# Patient Record
Sex: MALE | Race: WHITE | Employment: OTHER | URBAN - METROPOLITAN AREA
[De-identification: names, ages, dates, MRNs, and addresses within clinical notes are randomized per-mention and may not be internally consistent; named-entity substitution may affect disease eponyms.]

---

## 2017-01-29 PROBLEM — G56.20 ULNAR NERVE COMPRESSION: Status: ACTIVE | Noted: 2017-01-29

## 2017-01-29 PROBLEM — R55 SYNCOPE: Status: ACTIVE | Noted: 2017-01-29

## 2017-01-29 PROBLEM — I10 HYPERTENSION: Chronic | Status: ACTIVE | Noted: 2017-01-29

## 2017-01-29 PROBLEM — E11.9 DIABETES MELLITUS (HCC): Chronic | Status: ACTIVE | Noted: 2017-01-29

## 2017-01-29 PROBLEM — S02.92XA: Status: ACTIVE | Noted: 2017-01-29

## 2017-01-30 PROBLEM — G56.30 RADIAL NERVE COMPRESSION: Status: ACTIVE | Noted: 2017-01-29

## 2017-01-30 PROBLEM — G56.30 RADIAL NERVE PALSY: Status: ACTIVE | Noted: 2017-01-30

## 2017-02-07 ENCOUNTER — HOSPITAL ENCOUNTER (OUTPATIENT)
Dept: GENERAL RADIOLOGY | Age: 62
Discharge: HOME | End: 2017-02-07
Attending: PHYSICIAN ASSISTANT | Admitting: PHYSICIAN ASSISTANT

## 2017-02-07 DIAGNOSIS — M25.512 LEFT SHOULDER PAIN, UNSPECIFIED CHRONICITY: ICD-10-CM

## 2017-02-17 ENCOUNTER — NURSE ONLY (OUTPATIENT)
Dept: CARDIOLOGY CLINIC | Age: 62
End: 2017-02-17

## 2017-02-17 VITALS
SYSTOLIC BLOOD PRESSURE: 130 MMHG | DIASTOLIC BLOOD PRESSURE: 72 MMHG | HEART RATE: 71 BPM | RESPIRATION RATE: 14 BRPM | OXYGEN SATURATION: 96 %

## 2017-02-17 DIAGNOSIS — R55 SYNCOPE AND COLLAPSE: Primary | ICD-10-CM

## 2017-02-17 DIAGNOSIS — Z45.09 ENCOUNTER FOR LOOP RECORDER CHECK: ICD-10-CM

## 2017-02-17 PROCEDURE — 99024 POSTOP FOLLOW-UP VISIT: CPT | Performed by: INTERNAL MEDICINE

## 2017-02-17 ASSESSMENT — ENCOUNTER SYMPTOMS
BACK PAIN: 0
WHEEZING: 0
DIARRHEA: 0
CONSTIPATION: 0
ABDOMINAL PAIN: 0
CHEST TIGHTNESS: 0
VOMITING: 0
COUGH: 0
NAUSEA: 0
EYE PAIN: 0
COLOR CHANGE: 0
PHOTOPHOBIA: 0
BLOOD IN STOOL: 0
SHORTNESS OF BREATH: 0

## 2017-02-22 ENCOUNTER — OFFICE VISIT (OUTPATIENT)
Dept: CARDIOLOGY CLINIC | Age: 62
End: 2017-02-22

## 2017-02-22 VITALS
HEIGHT: 71 IN | WEIGHT: 206.2 LBS | SYSTOLIC BLOOD PRESSURE: 120 MMHG | HEART RATE: 86 BPM | BODY MASS INDEX: 28.87 KG/M2 | DIASTOLIC BLOOD PRESSURE: 60 MMHG

## 2017-02-22 DIAGNOSIS — R55 SYNCOPE, UNSPECIFIED SYNCOPE TYPE: ICD-10-CM

## 2017-02-22 DIAGNOSIS — R55 SYNCOPE AND COLLAPSE: ICD-10-CM

## 2017-02-22 DIAGNOSIS — E11.9 TYPE 2 DIABETES MELLITUS WITHOUT COMPLICATION, WITHOUT LONG-TERM CURRENT USE OF INSULIN (HCC): ICD-10-CM

## 2017-02-22 DIAGNOSIS — I10 ESSENTIAL HYPERTENSION: Primary | ICD-10-CM

## 2017-02-22 DIAGNOSIS — I48.0 PAROXYSMAL ATRIAL FIBRILLATION (HCC): ICD-10-CM

## 2017-02-22 PROCEDURE — 99213 OFFICE O/P EST LOW 20 MIN: CPT | Performed by: INTERNAL MEDICINE

## 2017-02-24 ENCOUNTER — HOSPITAL ENCOUNTER (OUTPATIENT)
Dept: OCCUPATIONAL THERAPY | Age: 62
Discharge: HOME | End: 2017-02-28
Attending: PSYCHIATRY & NEUROLOGY | Admitting: PSYCHIATRY & NEUROLOGY

## 2017-02-24 ENCOUNTER — HOSPITAL ENCOUNTER (OUTPATIENT)
Dept: PHYSICAL THERAPY | Age: 62
Discharge: HOME | End: 2017-02-24
Attending: PSYCHIATRY & NEUROLOGY | Admitting: PSYCHIATRY & NEUROLOGY

## 2017-02-27 ENCOUNTER — HOSPITAL ENCOUNTER (OUTPATIENT)
Dept: OCCUPATIONAL THERAPY | Age: 62
Discharge: HOME | End: 2017-02-27
Admitting: PSYCHIATRY & NEUROLOGY

## 2017-03-01 ENCOUNTER — HOSPITAL ENCOUNTER (OUTPATIENT)
Dept: OTHER | Age: 62
Discharge: HOME | End: 2017-03-31
Attending: PSYCHIATRY & NEUROLOGY | Admitting: PSYCHIATRY & NEUROLOGY

## 2017-03-06 ENCOUNTER — HOSPITAL ENCOUNTER (OUTPATIENT)
Dept: OCCUPATIONAL THERAPY | Age: 62
Discharge: HOME | End: 2017-03-06
Admitting: PSYCHIATRY & NEUROLOGY

## 2017-03-08 ENCOUNTER — HOSPITAL ENCOUNTER (OUTPATIENT)
Dept: OCCUPATIONAL THERAPY | Age: 62
Discharge: HOME | End: 2017-03-08
Admitting: PSYCHIATRY & NEUROLOGY

## 2017-03-13 ENCOUNTER — HOSPITAL ENCOUNTER (OUTPATIENT)
Dept: OCCUPATIONAL THERAPY | Age: 62
Discharge: HOME | End: 2017-03-13
Admitting: PSYCHIATRY & NEUROLOGY

## 2017-03-15 ENCOUNTER — HOSPITAL ENCOUNTER (OUTPATIENT)
Dept: OCCUPATIONAL THERAPY | Age: 62
Discharge: HOME | End: 2017-03-15
Admitting: PSYCHIATRY & NEUROLOGY

## 2017-03-20 ENCOUNTER — PROCEDURE VISIT (OUTPATIENT)
Dept: CARDIOLOGY CLINIC | Age: 62
End: 2017-03-20

## 2017-03-20 DIAGNOSIS — Z45.09 ENCOUNTER FOR ELECTRONIC ANALYSIS OF REVEAL EVENT RECORDER: ICD-10-CM

## 2017-03-20 DIAGNOSIS — R55 SYNCOPE, UNSPECIFIED SYNCOPE TYPE: Primary | ICD-10-CM

## 2017-03-20 PROCEDURE — 93298 REM INTERROG DEV EVAL SCRMS: CPT | Performed by: INTERNAL MEDICINE

## 2017-03-20 PROCEDURE — 93299 PR REM INTERROG ICPMS/SCRMS <30 D TECH REVIEW: CPT | Performed by: INTERNAL MEDICINE

## 2017-03-21 ENCOUNTER — HOSPITAL ENCOUNTER (OUTPATIENT)
Dept: OCCUPATIONAL THERAPY | Age: 62
Discharge: HOME | End: 2017-03-21
Admitting: PSYCHIATRY & NEUROLOGY

## 2017-03-23 ENCOUNTER — HOSPITAL ENCOUNTER (OUTPATIENT)
Dept: OCCUPATIONAL THERAPY | Age: 62
Discharge: HOME | End: 2017-03-23
Admitting: PSYCHIATRY & NEUROLOGY

## 2017-03-27 ENCOUNTER — HOSPITAL ENCOUNTER (OUTPATIENT)
Dept: OCCUPATIONAL THERAPY | Age: 62
Discharge: HOME | End: 2017-03-27
Admitting: PSYCHIATRY & NEUROLOGY

## 2017-03-29 ENCOUNTER — HOSPITAL ENCOUNTER (OUTPATIENT)
Dept: OCCUPATIONAL THERAPY | Age: 62
Discharge: HOME | End: 2017-03-29
Admitting: PSYCHIATRY & NEUROLOGY

## 2017-04-01 ENCOUNTER — HOSPITAL ENCOUNTER (OUTPATIENT)
Dept: OTHER | Age: 62
Discharge: HOME | End: 2017-04-30
Attending: PSYCHIATRY & NEUROLOGY | Admitting: PSYCHIATRY & NEUROLOGY

## 2017-04-03 ENCOUNTER — OFFICE VISIT (OUTPATIENT)
Dept: ORTHOPEDIC SURGERY | Age: 62
End: 2017-04-03

## 2017-04-03 ENCOUNTER — HOSPITAL ENCOUNTER (OUTPATIENT)
Dept: OCCUPATIONAL THERAPY | Age: 62
Discharge: HOME | End: 2017-04-03
Admitting: PSYCHIATRY & NEUROLOGY

## 2017-04-03 VITALS — WEIGHT: 206 LBS | RESPIRATION RATE: 16 BRPM | HEIGHT: 71 IN | BODY MASS INDEX: 28.84 KG/M2

## 2017-04-03 DIAGNOSIS — G56.32 RADIAL NERVE PALSY, LEFT: ICD-10-CM

## 2017-04-03 DIAGNOSIS — R52 PAIN: ICD-10-CM

## 2017-04-03 DIAGNOSIS — G56.92 COMPRESSION NEUROPATHY OF UPPER EXTREMITY, LEFT: Primary | ICD-10-CM

## 2017-04-03 DIAGNOSIS — S46.112A BICEPS TENDON TEAR, LEFT, INITIAL ENCOUNTER: ICD-10-CM

## 2017-04-03 PROCEDURE — 99244 OFF/OP CNSLTJ NEW/EST MOD 40: CPT | Performed by: ORTHOPAEDIC SURGERY

## 2017-04-03 RX ORDER — GABAPENTIN 300 MG/1
CAPSULE ORAL
COMMUNITY
Start: 2017-03-13 | End: 2019-05-20

## 2017-04-03 RX ORDER — PEN NEEDLE, DIABETIC 32GX 5/32"
NEEDLE, DISPOSABLE MISCELLANEOUS
COMMUNITY
Start: 2017-03-13

## 2017-04-03 RX ORDER — GABAPENTIN 300 MG/1
300 CAPSULE ORAL
COMMUNITY
End: 2017-04-03 | Stop reason: SDUPTHER

## 2017-04-03 RX ORDER — SULFAMETHOXAZOLE AND TRIMETHOPRIM 800; 160 MG/1; MG/1
TABLET ORAL
COMMUNITY
Start: 2017-02-03 | End: 2017-09-05

## 2017-04-03 RX ORDER — METFORMIN HYDROCHLORIDE 500 MG/1
TABLET, EXTENDED RELEASE ORAL
COMMUNITY
Start: 2017-03-05 | End: 2017-04-03 | Stop reason: SDUPTHER

## 2017-04-03 RX ORDER — HYDROCODONE BITARTRATE AND ACETAMINOPHEN 5; 325 MG/1; MG/1
TABLET ORAL
COMMUNITY
Start: 2017-02-02 | End: 2018-03-08

## 2017-04-03 RX ORDER — DIGOXIN 250 MCG
TABLET ORAL
COMMUNITY
Start: 2016-12-27 | End: 2018-08-03

## 2017-04-03 RX ORDER — BLOOD-GLUCOSE METER
EACH MISCELLANEOUS
COMMUNITY
Start: 2017-02-14

## 2017-04-03 RX ORDER — AMOXICILLIN 500 MG/1
CAPSULE ORAL
COMMUNITY
Start: 2017-02-01 | End: 2017-09-05 | Stop reason: ALTCHOICE

## 2017-04-03 RX ORDER — BLOOD SUGAR DIAGNOSTIC
STRIP MISCELLANEOUS
COMMUNITY
Start: 2017-02-14

## 2017-04-03 ASSESSMENT — ENCOUNTER SYMPTOMS
RESPIRATORY NEGATIVE: 1
EYES NEGATIVE: 1
GASTROINTESTINAL NEGATIVE: 1

## 2017-04-07 ENCOUNTER — HOSPITAL ENCOUNTER (OUTPATIENT)
Dept: OCCUPATIONAL THERAPY | Age: 62
Discharge: HOME | End: 2017-04-07
Admitting: PSYCHIATRY & NEUROLOGY

## 2017-04-11 ENCOUNTER — HOSPITAL ENCOUNTER (OUTPATIENT)
Dept: OCCUPATIONAL THERAPY | Age: 62
Discharge: HOME | End: 2017-04-11
Admitting: PSYCHIATRY & NEUROLOGY

## 2017-04-13 ENCOUNTER — HOSPITAL ENCOUNTER (OUTPATIENT)
Dept: OCCUPATIONAL THERAPY | Age: 62
Discharge: HOME | End: 2017-04-13
Admitting: PSYCHIATRY & NEUROLOGY

## 2017-04-19 ENCOUNTER — HOSPITAL ENCOUNTER (OUTPATIENT)
Dept: OCCUPATIONAL THERAPY | Age: 62
Discharge: HOME | End: 2017-04-19
Admitting: PSYCHIATRY & NEUROLOGY

## 2017-04-21 ENCOUNTER — HOSPITAL ENCOUNTER (OUTPATIENT)
Dept: OCCUPATIONAL THERAPY | Age: 62
Discharge: HOME | End: 2017-04-21
Admitting: PSYCHIATRY & NEUROLOGY

## 2017-04-25 ENCOUNTER — HOSPITAL ENCOUNTER (OUTPATIENT)
Dept: OCCUPATIONAL THERAPY | Age: 62
Discharge: HOME | End: 2017-04-25
Admitting: PSYCHIATRY & NEUROLOGY

## 2017-04-27 ENCOUNTER — TELEPHONE (OUTPATIENT)
Dept: CARDIOLOGY CLINIC | Age: 62
End: 2017-04-27

## 2017-04-27 ENCOUNTER — HOSPITAL ENCOUNTER (OUTPATIENT)
Dept: OCCUPATIONAL THERAPY | Age: 62
Discharge: HOME | End: 2017-04-27
Admitting: PSYCHIATRY & NEUROLOGY

## 2017-05-01 ENCOUNTER — HOSPITAL ENCOUNTER (OUTPATIENT)
Dept: OTHER | Age: 62
Discharge: HOME | End: 2017-05-31
Attending: PSYCHIATRY & NEUROLOGY | Admitting: PSYCHIATRY & NEUROLOGY

## 2017-05-02 ENCOUNTER — PROCEDURE VISIT (OUTPATIENT)
Dept: CARDIOLOGY CLINIC | Age: 62
End: 2017-05-02

## 2017-05-02 DIAGNOSIS — R55 SYNCOPE, UNSPECIFIED SYNCOPE TYPE: Primary | ICD-10-CM

## 2017-05-02 DIAGNOSIS — Z45.09 ENCOUNTER FOR ELECTRONIC ANALYSIS OF REVEAL EVENT RECORDER: ICD-10-CM

## 2017-05-02 PROCEDURE — 93299 PR REM INTERROG ICPMS/SCRMS <30 D TECH REVIEW: CPT | Performed by: INTERNAL MEDICINE

## 2017-05-02 PROCEDURE — 93298 REM INTERROG DEV EVAL SCRMS: CPT | Performed by: INTERNAL MEDICINE

## 2017-05-03 ENCOUNTER — HOSPITAL ENCOUNTER (OUTPATIENT)
Dept: OCCUPATIONAL THERAPY | Age: 62
Discharge: HOME | End: 2017-05-03
Admitting: PSYCHIATRY & NEUROLOGY

## 2017-05-05 ENCOUNTER — HOSPITAL ENCOUNTER (OUTPATIENT)
Dept: OCCUPATIONAL THERAPY | Age: 62
Discharge: HOME | End: 2017-05-05
Admitting: PSYCHIATRY & NEUROLOGY

## 2017-05-09 ENCOUNTER — HOSPITAL ENCOUNTER (OUTPATIENT)
Dept: OCCUPATIONAL THERAPY | Age: 62
Discharge: HOME | End: 2017-05-09
Admitting: PSYCHIATRY & NEUROLOGY

## 2017-05-09 LAB
AVERAGE GLUCOSE: NORMAL
HBA1C MFR BLD: 7.7 %

## 2017-05-11 ENCOUNTER — HOSPITAL ENCOUNTER (OUTPATIENT)
Dept: OCCUPATIONAL THERAPY | Age: 62
Discharge: HOME | End: 2017-05-11
Admitting: PSYCHIATRY & NEUROLOGY

## 2017-05-22 ENCOUNTER — HOSPITAL ENCOUNTER (OUTPATIENT)
Dept: OCCUPATIONAL THERAPY | Age: 62
Discharge: HOME | End: 2017-05-22
Admitting: PSYCHIATRY & NEUROLOGY

## 2017-05-22 ENCOUNTER — OFFICE VISIT (OUTPATIENT)
Dept: ORTHOPEDIC SURGERY | Age: 62
End: 2017-05-22

## 2017-05-22 VITALS — HEIGHT: 71 IN | RESPIRATION RATE: 16 BRPM | BODY MASS INDEX: 28.84 KG/M2 | WEIGHT: 206 LBS

## 2017-05-22 DIAGNOSIS — G54.0 BRACHIAL PLEXOPATHY: Primary | ICD-10-CM

## 2017-05-22 PROBLEM — S14.2XXA: Status: ACTIVE | Noted: 2017-03-20

## 2017-05-22 PROBLEM — R42 FEELING FAINT: Status: ACTIVE | Noted: 2017-03-20

## 2017-05-22 PROBLEM — R42 DIZZINESS: Status: ACTIVE | Noted: 2017-03-20

## 2017-05-22 PROBLEM — M54.12 CERVICAL NERVE ROOT DISORDER: Status: ACTIVE | Noted: 2017-03-20

## 2017-05-22 PROBLEM — G58.9: Status: ACTIVE | Noted: 2017-03-20

## 2017-05-22 PROBLEM — Z98.890 HISTORY OF BILIARY T-TUBE PLACEMENT: Status: ACTIVE | Noted: 2017-03-20

## 2017-05-22 PROBLEM — F07.81 BRAIN SYNDROME, POSTTRAUMATIC: Status: ACTIVE | Noted: 2017-03-20

## 2017-05-22 PROBLEM — R93.89 ABNORMAL MAGNETIC RESONANCE IMAGING STUDY: Status: ACTIVE | Noted: 2017-03-20

## 2017-05-22 PROBLEM — S06.33AA CEREBRAL CONTUSION: Status: ACTIVE | Noted: 2017-03-20

## 2017-05-22 PROBLEM — D64.9 ANEMIA: Status: ACTIVE | Noted: 2017-03-20

## 2017-05-22 PROBLEM — G90.9 DISORDER OF AUTONOMIC NERVOUS SYSTEM: Status: ACTIVE | Noted: 2017-03-20

## 2017-05-22 PROCEDURE — 99213 OFFICE O/P EST LOW 20 MIN: CPT | Performed by: ORTHOPAEDIC SURGERY

## 2017-05-22 RX ORDER — METFORMIN HYDROCHLORIDE 500 MG/1
TABLET, EXTENDED RELEASE ORAL
COMMUNITY
Start: 2017-05-04 | End: 2018-03-08

## 2017-05-22 RX ORDER — INSULIN GLARGINE 300 U/ML
INJECTION, SOLUTION SUBCUTANEOUS
COMMUNITY
Start: 2017-05-01 | End: 2018-03-08

## 2017-05-26 ENCOUNTER — HOSPITAL ENCOUNTER (OUTPATIENT)
Dept: OCCUPATIONAL THERAPY | Age: 62
Discharge: HOME | End: 2017-05-26
Admitting: PSYCHIATRY & NEUROLOGY

## 2017-05-30 ENCOUNTER — HOSPITAL ENCOUNTER (OUTPATIENT)
Dept: PHYSICAL THERAPY | Age: 62
Discharge: HOME | End: 2017-05-31
Attending: FAMILY MEDICINE | Admitting: FAMILY MEDICINE

## 2017-05-30 ENCOUNTER — HOSPITAL ENCOUNTER (OUTPATIENT)
Dept: OCCUPATIONAL THERAPY | Age: 62
Discharge: HOME | End: 2017-05-30
Admitting: PSYCHIATRY & NEUROLOGY

## 2017-06-01 ENCOUNTER — HOSPITAL ENCOUNTER (OUTPATIENT)
Dept: OTHER | Age: 62
Discharge: HOME | End: 2017-06-16
Attending: PSYCHIATRY & NEUROLOGY | Admitting: PSYCHIATRY & NEUROLOGY

## 2017-06-01 ENCOUNTER — HOSPITAL ENCOUNTER (OUTPATIENT)
Dept: OTHER | Age: 62
Discharge: HOME | End: 2017-06-30
Attending: FAMILY MEDICINE | Admitting: FAMILY MEDICINE

## 2017-06-05 ENCOUNTER — HOSPITAL ENCOUNTER (OUTPATIENT)
Dept: PHYSICAL THERAPY | Age: 62
Discharge: HOME | End: 2017-06-05
Admitting: FAMILY MEDICINE

## 2017-06-05 ENCOUNTER — HOSPITAL ENCOUNTER (OUTPATIENT)
Dept: OCCUPATIONAL THERAPY | Age: 62
Discharge: HOME | End: 2017-06-05
Admitting: PSYCHIATRY & NEUROLOGY

## 2017-06-09 ENCOUNTER — HOSPITAL ENCOUNTER (OUTPATIENT)
Dept: OCCUPATIONAL THERAPY | Age: 62
Discharge: HOME | End: 2017-06-09
Admitting: PSYCHIATRY & NEUROLOGY

## 2017-06-13 ENCOUNTER — HOSPITAL ENCOUNTER (OUTPATIENT)
Dept: OCCUPATIONAL THERAPY | Age: 62
Discharge: HOME | End: 2017-06-13
Admitting: PSYCHIATRY & NEUROLOGY

## 2017-06-16 ENCOUNTER — HOSPITAL ENCOUNTER (OUTPATIENT)
Dept: OCCUPATIONAL THERAPY | Age: 62
Discharge: HOME | End: 2017-06-16
Admitting: PSYCHIATRY & NEUROLOGY

## 2017-06-16 ENCOUNTER — PROCEDURE VISIT (OUTPATIENT)
Dept: CARDIOLOGY CLINIC | Age: 62
End: 2017-06-16

## 2017-06-16 DIAGNOSIS — R55 SYNCOPE, UNSPECIFIED SYNCOPE TYPE: Primary | ICD-10-CM

## 2017-06-16 DIAGNOSIS — Z45.09 ENCOUNTER FOR ELECTRONIC ANALYSIS OF REVEAL EVENT RECORDER: ICD-10-CM

## 2017-06-16 PROCEDURE — 93298 REM INTERROG DEV EVAL SCRMS: CPT | Performed by: INTERNAL MEDICINE

## 2017-06-16 PROCEDURE — 93299 PR REM INTERROG ICPMS/SCRMS <30 D TECH REVIEW: CPT | Performed by: INTERNAL MEDICINE

## 2017-07-01 ENCOUNTER — HOSPITAL ENCOUNTER (OUTPATIENT)
Dept: OTHER | Age: 62
Discharge: HOME | End: 2017-07-31
Attending: FAMILY MEDICINE | Admitting: PSYCHIATRY & NEUROLOGY

## 2017-08-01 ENCOUNTER — HOSPITAL ENCOUNTER (OUTPATIENT)
Dept: OTHER | Age: 62
Discharge: HOME | End: 2017-08-31
Attending: FAMILY MEDICINE | Admitting: PSYCHIATRY & NEUROLOGY

## 2017-08-03 ENCOUNTER — HOSPITAL ENCOUNTER (OUTPATIENT)
Dept: PHYSICAL THERAPY | Age: 62
Discharge: HOME | End: 2017-08-03
Admitting: PSYCHIATRY & NEUROLOGY

## 2017-08-14 ENCOUNTER — PROCEDURE VISIT (OUTPATIENT)
Dept: CARDIOLOGY CLINIC | Age: 62
End: 2017-08-14

## 2017-08-14 DIAGNOSIS — Z45.09 ENCOUNTER FOR ELECTRONIC ANALYSIS OF REVEAL EVENT RECORDER: ICD-10-CM

## 2017-08-14 DIAGNOSIS — R55 SYNCOPE, UNSPECIFIED SYNCOPE TYPE: Primary | ICD-10-CM

## 2017-08-14 PROCEDURE — 93298 REM INTERROG DEV EVAL SCRMS: CPT | Performed by: INTERNAL MEDICINE

## 2017-08-14 PROCEDURE — 93299 PR REM INTERROG ICPMS/SCRMS <30 D TECH REVIEW: CPT | Performed by: INTERNAL MEDICINE

## 2017-09-01 ENCOUNTER — HOSPITAL ENCOUNTER (OUTPATIENT)
Dept: OTHER | Age: 62
Discharge: HOME | End: 2017-09-30
Attending: FAMILY MEDICINE | Admitting: PSYCHIATRY & NEUROLOGY

## 2017-09-05 ENCOUNTER — OFFICE VISIT (OUTPATIENT)
Dept: CARDIOLOGY CLINIC | Age: 62
End: 2017-09-05

## 2017-09-05 VITALS
WEIGHT: 234.8 LBS | SYSTOLIC BLOOD PRESSURE: 128 MMHG | BODY MASS INDEX: 32.87 KG/M2 | DIASTOLIC BLOOD PRESSURE: 60 MMHG | HEART RATE: 80 BPM | HEIGHT: 71 IN

## 2017-09-05 DIAGNOSIS — I10 ESSENTIAL HYPERTENSION: Primary | ICD-10-CM

## 2017-09-05 DIAGNOSIS — R55 SYNCOPE, UNSPECIFIED SYNCOPE TYPE: ICD-10-CM

## 2017-09-05 DIAGNOSIS — I48.0 PAROXYSMAL ATRIAL FIBRILLATION (HCC): ICD-10-CM

## 2017-09-05 DIAGNOSIS — E11.9 TYPE 2 DIABETES MELLITUS WITHOUT COMPLICATION, WITHOUT LONG-TERM CURRENT USE OF INSULIN (HCC): ICD-10-CM

## 2017-09-05 DIAGNOSIS — R55 SYNCOPE AND COLLAPSE: ICD-10-CM

## 2017-09-05 PROCEDURE — 99213 OFFICE O/P EST LOW 20 MIN: CPT | Performed by: INTERNAL MEDICINE

## 2017-09-14 ENCOUNTER — PROCEDURE VISIT (OUTPATIENT)
Dept: CARDIOLOGY CLINIC | Age: 62
End: 2017-09-14

## 2017-09-14 DIAGNOSIS — Z45.09 ENCOUNTER FOR ELECTRONIC ANALYSIS OF REVEAL EVENT RECORDER: ICD-10-CM

## 2017-09-14 DIAGNOSIS — R55 SYNCOPE, UNSPECIFIED SYNCOPE TYPE: Primary | ICD-10-CM

## 2017-09-14 PROCEDURE — 93298 REM INTERROG DEV EVAL SCRMS: CPT | Performed by: INTERNAL MEDICINE

## 2017-09-14 PROCEDURE — 93299 PR REM INTERROG ICPMS/SCRMS <30 D TECH REVIEW: CPT | Performed by: INTERNAL MEDICINE

## 2017-09-19 ENCOUNTER — TELEPHONE (OUTPATIENT)
Dept: CARDIOLOGY CLINIC | Age: 62
End: 2017-09-19

## 2017-10-01 ENCOUNTER — HOSPITAL ENCOUNTER (OUTPATIENT)
Dept: OTHER | Age: 62
Discharge: HOME | End: 2017-10-06
Attending: FAMILY MEDICINE | Admitting: PSYCHIATRY & NEUROLOGY

## 2017-10-31 ENCOUNTER — PROCEDURE VISIT (OUTPATIENT)
Dept: CARDIOLOGY CLINIC | Age: 62
End: 2017-10-31

## 2017-10-31 DIAGNOSIS — R55 SYNCOPE, UNSPECIFIED SYNCOPE TYPE: Primary | ICD-10-CM

## 2017-10-31 DIAGNOSIS — Z45.09 ENCOUNTER FOR ELECTRONIC ANALYSIS OF REVEAL EVENT RECORDER: ICD-10-CM

## 2017-10-31 PROCEDURE — 93299 PR REM INTERROG ICPMS/SCRMS <30 D TECH REVIEW: CPT | Performed by: INTERNAL MEDICINE

## 2017-10-31 PROCEDURE — 93298 REM INTERROG DEV EVAL SCRMS: CPT | Performed by: INTERNAL MEDICINE

## 2017-11-30 ENCOUNTER — TELEPHONE (OUTPATIENT)
Dept: CARDIOLOGY CLINIC | Age: 62
End: 2017-11-30

## 2017-12-13 ENCOUNTER — PROCEDURE VISIT (OUTPATIENT)
Dept: CARDIOLOGY CLINIC | Age: 62
End: 2017-12-13

## 2017-12-13 DIAGNOSIS — R55 SYNCOPE, UNSPECIFIED SYNCOPE TYPE: Primary | ICD-10-CM

## 2017-12-13 DIAGNOSIS — Z45.09 ENCOUNTER FOR ELECTRONIC ANALYSIS OF REVEAL EVENT RECORDER: ICD-10-CM

## 2017-12-13 PROCEDURE — 93298 REM INTERROG DEV EVAL SCRMS: CPT | Performed by: INTERNAL MEDICINE

## 2017-12-13 PROCEDURE — 93299 PR REM INTERROG ICPMS/SCRMS <30 D TECH REVIEW: CPT | Performed by: INTERNAL MEDICINE

## 2017-12-19 ENCOUNTER — TELEPHONE (OUTPATIENT)
Dept: CARDIOLOGY CLINIC | Age: 62
End: 2017-12-19

## 2018-02-06 ENCOUNTER — PROCEDURE VISIT (OUTPATIENT)
Dept: CARDIOLOGY CLINIC | Age: 63
End: 2018-02-06

## 2018-02-06 DIAGNOSIS — R55 SYNCOPE, UNSPECIFIED SYNCOPE TYPE: Primary | ICD-10-CM

## 2018-02-06 DIAGNOSIS — Z45.09 ENCOUNTER FOR ELECTRONIC ANALYSIS OF REVEAL EVENT RECORDER: ICD-10-CM

## 2018-02-06 PROCEDURE — 93298 REM INTERROG DEV EVAL SCRMS: CPT | Performed by: INTERNAL MEDICINE

## 2018-02-06 PROCEDURE — 93299 PR REM INTERROG ICPMS/SCRMS <30 D TECH REVIEW: CPT | Performed by: INTERNAL MEDICINE

## 2018-03-08 ENCOUNTER — OFFICE VISIT (OUTPATIENT)
Dept: CARDIOLOGY CLINIC | Age: 63
End: 2018-03-08

## 2018-03-08 VITALS
WEIGHT: 241 LBS | SYSTOLIC BLOOD PRESSURE: 120 MMHG | HEIGHT: 71 IN | HEART RATE: 80 BPM | DIASTOLIC BLOOD PRESSURE: 60 MMHG | BODY MASS INDEX: 33.74 KG/M2

## 2018-03-08 DIAGNOSIS — I48.0 PAROXYSMAL ATRIAL FIBRILLATION (HCC): ICD-10-CM

## 2018-03-08 DIAGNOSIS — R55 SYNCOPE AND COLLAPSE: Primary | ICD-10-CM

## 2018-03-08 DIAGNOSIS — I10 ESSENTIAL HYPERTENSION: ICD-10-CM

## 2018-03-08 DIAGNOSIS — E78.5 DYSLIPIDEMIA: ICD-10-CM

## 2018-03-08 DIAGNOSIS — I77.9 BILATERAL CAROTID ARTERY DISEASE (HCC): ICD-10-CM

## 2018-03-08 DIAGNOSIS — E11.9 TYPE 2 DIABETES MELLITUS WITHOUT COMPLICATION, WITHOUT LONG-TERM CURRENT USE OF INSULIN (HCC): ICD-10-CM

## 2018-03-08 PROCEDURE — G8417 CALC BMI ABV UP PARAM F/U: HCPCS | Performed by: INTERNAL MEDICINE

## 2018-03-08 PROCEDURE — 3017F COLORECTAL CA SCREEN DOC REV: CPT | Performed by: INTERNAL MEDICINE

## 2018-03-08 PROCEDURE — G8484 FLU IMMUNIZE NO ADMIN: HCPCS | Performed by: INTERNAL MEDICINE

## 2018-03-08 PROCEDURE — 99214 OFFICE O/P EST MOD 30 MIN: CPT | Performed by: INTERNAL MEDICINE

## 2018-03-08 PROCEDURE — 4040F PNEUMOC VAC/ADMIN/RCVD: CPT | Performed by: INTERNAL MEDICINE

## 2018-03-08 PROCEDURE — 3046F HEMOGLOBIN A1C LEVEL >9.0%: CPT | Performed by: INTERNAL MEDICINE

## 2018-03-08 PROCEDURE — 1123F ACP DISCUSS/DSCN MKR DOCD: CPT | Performed by: INTERNAL MEDICINE

## 2018-03-08 PROCEDURE — 1036F TOBACCO NON-USER: CPT | Performed by: INTERNAL MEDICINE

## 2018-03-08 PROCEDURE — G8427 DOCREV CUR MEDS BY ELIG CLIN: HCPCS | Performed by: INTERNAL MEDICINE

## 2018-03-08 RX ORDER — PRENATAL VIT 91/IRON/FOLIC/DHA 28-975-200
COMBINATION PACKAGE (EA) ORAL 2 TIMES DAILY
COMMUNITY
End: 2021-03-05 | Stop reason: ALTCHOICE

## 2018-03-08 NOTE — PROGRESS NOTES
OFFICE PROGRESS NOTES      Ellen Ventura is a 70 y.o. male who has    CHIEF COMPLAINT AS FOLLOWS:  CHEST PAIN: Patient denies any C/O chest pains at this time. SOB: No C/O SOB at this time. LEG EDEMA: No leg edema   PALPITATIONS: Denies any C/O Palpitations                                   DIZZINESS: No C/O Dizziness   SYNCOPE: None   OTHER:                                     HPI: Patient is here for F/U on his  HTN & Dyslipidemia problems. He does not have any complaints at this time.     Ranjana Rodriges has the following history recorded in care path:  Patient Active Problem List    Diagnosis Date Noted    Type 2 diabetes mellitus without complication, without long-term current use of insulin ([facility]      Priority: High    Essential hypertension      Priority: High    Compression neuropathy of upper extremity      Priority: High    Syncope and collapse      Priority: High    Right orbital fracture (HCC)      Priority: High    Paroxysmal atrial fibrillation (HCC)      Priority: High    Radial nerve palsy 01/30/2017     Priority: Low    Syncope 01/29/2017     Priority: Low    Facial bones, closed fracture, closed, initial encounter ([facility] 01/29/2017     Priority: Low    Radial nerve compression 01/29/2017     Priority: Low    Carotid artery disease ([facility] [facility] 03/08/2018    Dyslipidemia 03/08/2018    Brachial plexopathy 05/22/2017    Abnormal magnetic resonance imaging study 03/20/2017    Anemia 03/20/2017    Disorder of autonomic nervous system 03/20/2017    Avulsion of cervical nerve root 03/20/2017    BPN (brachial plexus neuropathy) 03/20/2017    Cerebral contusion ([facility] 03/20/2017    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 Pulmonary  No respiratory distress. No wheezes or rales. Abdomen  No masses, tenderness, or organomegaly. Musculoskeletal  No significant edema. No joint deformities. No muscle wasting. Neurologic  Cranial nerves II through XII are grossly intact. There were no gross focal neurologic abnormalities. Lab Review   Lab Results   Component Value Date    CKTOTAL 254 01/28/2017    TROPONINT <0.010 01/28/2017     BNP:    Lab Results   Component Value Date    BNP 92 07/30/2011     PT/INR:    Lab Results   Component Value Date    INR 1.01 07/30/2011     Lab Results   Component Value Date    LABA1C 7.7 05/09/2017    LABA1C 9.3 (H) 01/28/2017     Lab Results   Component Value Date    WBC 14.3 (H) 01/28/2017    HCT 40.3 (L) 01/28/2017    MCV 92.4 01/28/2017     01/28/2017     Lab Results   Component Value Date    CHOL 206 (H) 07/20/2011    TRIG 230 (H) 07/20/2011    HDL 41 (L) 07/20/2011    LDLDIRECT 137 (H) 07/20/2011     Lab Results   Component Value Date    ALT 68 (H) 01/28/2017    AST 76 (H) 01/28/2017     BMP:    Lab Results   Component Value Date     01/28/2017    K 4.2 01/28/2017    CL 88 01/28/2017    CO2 23 01/28/2017    BUN 20 01/28/2017    CREATININE 0.7 01/28/2017     CMP:   Lab Results   Component Value Date     01/28/2017    K 4.2 01/28/2017    CL 88 01/28/2017    CO2 23 01/28/2017    BUN 20 01/28/2017    PROT 7.4 01/28/2017    PROT 7.6 07/30/2011     TSH:    Lab Results   Component Value Date    TSHHS 2.880 07/31/2011       QUALITY MEASURES REVIEWED:  1.CAD:Patient is taking anti platelet agent:Yes  2. DYSLIPIDEMIA: Patient is on cholesterol lowering medication:Yes  3. Beta-Blocker therapy for CAD, if prior Myocardial Infarction:No  4. Atrial fibrillation & anticoagulation therapy No  5. Discussed weight management strategies. Impression:    1. Syncope and collapse    2. Paroxysmal atrial fibrillation (HCC)    3.  Type 2 diabetes mellitus without CAROTID ARTERY DISEASE:.yes,                No symptoms described pertaining to Carotid artery disease               Up to date on non invasive testing . no               Patient is on Guidelines recommended therapy. yes,   OTHER RELEVANT DIAGNOSIS:as noted in patient's active problem list:Obesity  TESTS ORDERED: Carotid US. All previously ordered tests reviewed. ARRHYTHMIAS:  Patient has H/O Atrial fibrillation, But Loop recorder does not reveal any atrial fibrillation   MEDICATIONS: CPM   Office f/u in six months. Primary/secondary prevention is the goal by aggressive risk modification, healthy and therapeutic life style changes for cardiovascular risk reduction. Various goals are discussed and multiple questions answered.

## 2018-03-08 NOTE — PATIENT INSTRUCTIONS
CAD:No  HTN:well controlled on current medical regimen, see list above. - changes in  treatment:   no   CARDIOMYOPATHY: None known   CONGESTIVE HEART FAILURE: NO KNOWN HISTORY.      VHD: No significant VHD noted  DYSLIPIDEMIA: Patient's profile is at / near Goal.no                                HDL is low                                Tolerating current medical regimen wellyes,                                                            See most recent Lab values in Labs section above. Diabetes mellitis: .yes,                                      Managed by family MD BENNETT under good control no                                     Hgb A1c avilable yes,   CAROTID ARTERY DISEASE:.yes,                No symptoms described pertaining to Carotid artery disease               Up to date on non invasive testing . no               Patient is on Guidelines recommended therapy. yes,   OTHER RELEVANT DIAGNOSIS:as noted in patient's active problem list:Obesity  TESTS ORDERED: Carotid US. All previously ordered tests reviewed. ARRHYTHMIAS:  Patient has H/O Atrial fibrillation, But Loop recorder does not reveal any atrial fibrillation   MEDICATIONS: CPM   Office f/u in six months. Primary/secondary prevention is the goal by aggressive risk modification, healthy and therapeutic life style changes for cardiovascular risk reduction. Various goals are discussed and multiple questions answered.

## 2018-03-13 ENCOUNTER — PROCEDURE VISIT (OUTPATIENT)
Dept: CARDIOLOGY CLINIC | Age: 63
End: 2018-03-13

## 2018-03-13 DIAGNOSIS — Z45.09 ENCOUNTER FOR ELECTRONIC ANALYSIS OF REVEAL EVENT RECORDER: ICD-10-CM

## 2018-03-13 DIAGNOSIS — R55 SYNCOPE, UNSPECIFIED SYNCOPE TYPE: Primary | ICD-10-CM

## 2018-03-13 PROCEDURE — 93299 PR REM INTERROG ICPMS/SCRMS <30 D TECH REVIEW: CPT | Performed by: INTERNAL MEDICINE

## 2018-03-13 PROCEDURE — 93298 REM INTERROG DEV EVAL SCRMS: CPT | Performed by: INTERNAL MEDICINE

## 2018-03-21 ENCOUNTER — PROCEDURE VISIT (OUTPATIENT)
Dept: CARDIOLOGY CLINIC | Age: 63
End: 2018-03-21

## 2018-03-21 DIAGNOSIS — R55 SYNCOPE AND COLLAPSE: ICD-10-CM

## 2018-03-21 DIAGNOSIS — I48.0 PAROXYSMAL ATRIAL FIBRILLATION (HCC): ICD-10-CM

## 2018-03-21 DIAGNOSIS — I10 ESSENTIAL HYPERTENSION: ICD-10-CM

## 2018-03-21 DIAGNOSIS — E78.5 DYSLIPIDEMIA: ICD-10-CM

## 2018-03-21 DIAGNOSIS — E11.9 TYPE 2 DIABETES MELLITUS WITHOUT COMPLICATION, WITHOUT LONG-TERM CURRENT USE OF INSULIN (HCC): ICD-10-CM

## 2018-03-21 DIAGNOSIS — I77.9 BILATERAL CAROTID ARTERY DISEASE (HCC): Primary | ICD-10-CM

## 2018-03-21 PROCEDURE — 93880 EXTRACRANIAL BILAT STUDY: CPT | Performed by: INTERNAL MEDICINE

## 2018-03-22 ENCOUNTER — TELEPHONE (OUTPATIENT)
Dept: CARDIOLOGY CLINIC | Age: 63
End: 2018-03-22

## 2018-03-22 NOTE — TELEPHONE ENCOUNTER
Notified Pt of Carotid results. Verbalized understanding. Mild (0-49%) disease of the Bilateral Internal carotid artery. No hemodynamically significant stenosis noted. Normal vertebral flow.

## 2018-04-23 LAB
ALBUMIN SERPL-MCNC: 4.1 G/DL
ALP BLD-CCNC: 103 U/L
ALT SERPL-CCNC: 44 U/L
ANION GAP SERPL CALCULATED.3IONS-SCNC: NORMAL MMOL/L
AST SERPL-CCNC: 41 U/L
BILIRUB SERPL-MCNC: 0.2 MG/DL (ref 0.1–1.4)
BUN BLDV-MCNC: 12 MG/DL
CALCIUM SERPL-MCNC: 9.8 MG/DL
CHLORIDE BLD-SCNC: 98 MMOL/L
CHOLESTEROL, TOTAL: 113 MG/DL
CHOLESTEROL/HDL RATIO: NORMAL
CO2: 33 MMOL/L
CREAT SERPL-MCNC: 0.6 MG/DL
GFR CALCULATED: NORMAL
GLUCOSE BLD-MCNC: 146 MG/DL
HDLC SERPL-MCNC: 58 MG/DL (ref 35–70)
LDL CHOLESTEROL CALCULATED: 69 MG/DL (ref 0–160)
POTASSIUM SERPL-SCNC: 4.3 MMOL/L
SODIUM BLD-SCNC: 141 MMOL/L
TOTAL PROTEIN: 6.6
TRIGL SERPL-MCNC: 81 MG/DL
VLDLC SERPL CALC-MCNC: 15 MG/DL

## 2018-04-24 LAB
ALBUMIN SERPL-MCNC: 4.1 G/DL
ALP BLD-CCNC: 103 U/L
ALT SERPL-CCNC: 44 U/L
ANION GAP SERPL CALCULATED.3IONS-SCNC: 1.6 MMOL/L
AST SERPL-CCNC: 41 U/L
AVERAGE GLUCOSE: ABNORMAL
BILIRUB SERPL-MCNC: 0.2 MG/DL (ref 0.1–1.4)
BUN BLDV-MCNC: 12 MG/DL
CALCIUM SERPL-MCNC: 9.8 MG/DL
CHLORIDE BLD-SCNC: 98 MMOL/L
CHOLESTEROL, TOTAL: 143 MG/DL
CHOLESTEROL/HDL RATIO: NORMAL
CO2: 33 MMOL/L
CREAT SERPL-MCNC: 0.8 MG/DL
GFR CALCULATED: 94
GLUCOSE BLD-MCNC: 146 MG/DL
HBA1C MFR BLD: 7.4 %
HDLC SERPL-MCNC: 58 MG/DL (ref 35–70)
LDL CHOLESTEROL CALCULATED: 69 MG/DL (ref 0–160)
POTASSIUM SERPL-SCNC: 4.3 MMOL/L
SODIUM BLD-SCNC: 141 MMOL/L
TOTAL PROTEIN: 6.6
TRIGL SERPL-MCNC: 81 MG/DL
VLDLC SERPL CALC-MCNC: 16 MG/DL

## 2018-06-10 PROCEDURE — 93298 REM INTERROG DEV EVAL SCRMS: CPT | Performed by: INTERNAL MEDICINE

## 2018-06-10 PROCEDURE — 93299 PR REM INTERROG ICPMS/SCRMS <30 D TECH REVIEW: CPT | Performed by: INTERNAL MEDICINE

## 2018-07-06 ENCOUNTER — PROCEDURE VISIT (OUTPATIENT)
Dept: CARDIOLOGY CLINIC | Age: 63
End: 2018-07-06

## 2018-07-06 DIAGNOSIS — R55 SYNCOPE, UNSPECIFIED SYNCOPE TYPE: Primary | ICD-10-CM

## 2018-07-06 DIAGNOSIS — Z45.09 ENCOUNTER FOR ELECTRONIC ANALYSIS OF REVEAL EVENT RECORDER: ICD-10-CM

## 2018-07-13 ENCOUNTER — TELEPHONE (OUTPATIENT)
Dept: CARDIOLOGY CLINIC | Age: 63
End: 2018-07-13

## 2018-07-16 ENCOUNTER — TELEPHONE (OUTPATIENT)
Dept: CARDIOLOGY CLINIC | Age: 63
End: 2018-07-16

## 2018-08-03 ENCOUNTER — OFFICE VISIT (OUTPATIENT)
Dept: CARDIOLOGY CLINIC | Age: 63
End: 2018-08-03

## 2018-08-03 VITALS
HEIGHT: 71 IN | BODY MASS INDEX: 33.85 KG/M2 | SYSTOLIC BLOOD PRESSURE: 130 MMHG | WEIGHT: 241.8 LBS | DIASTOLIC BLOOD PRESSURE: 72 MMHG

## 2018-08-03 DIAGNOSIS — I48.91 ATRIAL FIBRILLATION AND FLUTTER (HCC): Primary | ICD-10-CM

## 2018-08-03 DIAGNOSIS — I48.92 ATRIAL FIBRILLATION AND FLUTTER (HCC): Primary | ICD-10-CM

## 2018-08-03 PROCEDURE — 4040F PNEUMOC VAC/ADMIN/RCVD: CPT | Performed by: INTERNAL MEDICINE

## 2018-08-03 PROCEDURE — 93000 ELECTROCARDIOGRAM COMPLETE: CPT | Performed by: INTERNAL MEDICINE

## 2018-08-03 PROCEDURE — 1101F PT FALLS ASSESS-DOCD LE1/YR: CPT | Performed by: INTERNAL MEDICINE

## 2018-08-03 PROCEDURE — 1123F ACP DISCUSS/DSCN MKR DOCD: CPT | Performed by: INTERNAL MEDICINE

## 2018-08-03 PROCEDURE — G8417 CALC BMI ABV UP PARAM F/U: HCPCS | Performed by: INTERNAL MEDICINE

## 2018-08-03 PROCEDURE — G8427 DOCREV CUR MEDS BY ELIG CLIN: HCPCS | Performed by: INTERNAL MEDICINE

## 2018-08-03 PROCEDURE — 99213 OFFICE O/P EST LOW 20 MIN: CPT | Performed by: INTERNAL MEDICINE

## 2018-08-03 PROCEDURE — 3017F COLORECTAL CA SCREEN DOC REV: CPT | Performed by: INTERNAL MEDICINE

## 2018-08-03 PROCEDURE — 1036F TOBACCO NON-USER: CPT | Performed by: INTERNAL MEDICINE

## 2018-08-03 NOTE — PROGRESS NOTES
Patient here in office and educated on A-fib ablation w/CT, schedule for 8/22/18 @ 7:45, with arrival @ 5:45, @ [facility]; risk explained; and consents signed. Also copy of orders given for labs and CXR due 8/20/18 at [facility]. Instruction given to patient to :  NPO after midnight the night before procedure; call hospital at [phone] to pre-register. May take rest of morning meds of procedure. Hold Metformin the day before, the day of and two days after procedure. Hold Xarelto the morning before procedure. Take insulin 1/2 dose on the night before. Do not take regular insulin dose the morning of the procedure. Hold ALL blood pressure medications morning of procedure. Hold Actos & Glimepiride 24 hours prior to procedure. Patient voiced understanding. Copies of consent & info scanned in chart.

## 2018-08-03 NOTE — PROGRESS NOTES
Electrophysiology follow up Note      Reason for consultation: Syncope and PAF    Chief complaint : ATRIAL FIBRILLATION    Primary care physician: Nataly Mojica MD      History of Present Illness: This visit (8/3/2018)      Chief Complaint   Patient presents with    Atrial Fibrillation     Patient here w afib w RVT on linq. Pt denies CP, SOB, dizziness, palpitations or edema. Pt has questions on whether he should be taking aspirin. Felt some times he was tired or weak. Felt dizzy at times but did not pass out       Previous visit:    Patient with recent history of syncope was admitted in the hospital. Patient had a loop recorder placed at the time  Here for post hospital discharge follow up  Patient denies any symptoms feeling better  Patient denies chest pain, shortness of breath, fever, chills  No palpitations        Past medical history:   Past Medical History:   Diagnosis Date    Atrial fibrillation ([address]     Carotid Doppler 03/21/2018    Mild (0-49%) disease of the Bilateral Internal carotid artery. No hemodynamically significant stenosis noted. Normal vertebral flow. 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 No reversible myocardial ischemia seen. Uneventful pharmacological     History of echocardiogram 01/30/2017    Ejection fraction is visually estimated at 55%. mildly Increased LVOT velocity. Aortic valve leaflets are somewhat thickened. Aortic valve appears tricuspid. Trivial aortic regurgitation is noted. Mitral annular calcification is present. Mild calcification of the anterior leaflet of the mitral valve.  Hyperlipidemia     Hypertension        Surgical history : History reviewed. No pertinent surgical history. Family history: No History of sudden death    Social history :  reports that he has never smoked.  He has never used smokeless tobacco. He Gastrointestinal: Negative for abdominal pain, blood in stool, constipation, diarrhea, nausea and vomiting. Endocrine: Negative for cold intolerance and heat intolerance. Genitourinary: Negative for dysuria, flank pain and hematuria. Musculoskeletal: Negative for arthralgias, back pain, myalgias and neck stiffness. Skin: Negative for color change and rash. Allergic/Immunologic: Negative for food allergies. Neurological: Negative for dizziness, light-headedness, numbness and headaches. Hematological: Does not bruise/bleed easily. Psychiatric/Behavioral: Negative for agitation, behavioral problems and confusion. Physical Examination:    /72 (Site: Left Arm, Position: Sitting, Cuff Size: Large Adult)   Ht 5' 11\" (1.803 m)   Wt 241 lb 12.8 oz (109.7 kg)   BMI 33.72 kg/m²    Wt Readings from Last 3 Encounters:   08/03/18 241 lb 12.8 oz (109.7 kg)   03/08/18 241 lb (109.3 kg)   09/05/17 234 lb 12.8 oz (106.5 kg)     Body mass index is 33.72 kg/m². Physical Exam   Constitutional: He is oriented to person, place, and time and well-developed, well-nourished, and in no distress. HENT:   Head: Normocephalic and atraumatic. Eyes: Conjunctivae and EOM are normal. Pupils are equal, round, and reactive to light. Right eye exhibits no discharge. Neck: Normal range of motion. No JVD present. No thyromegaly present. Cardiovascular: Normal rate, regular rhythm and normal heart sounds. Exam reveals no friction rub. No murmur heard. Pulmonary/Chest: Effort normal and breath sounds normal. No stridor. No respiratory distress. He has no wheezes. Abdominal: Soft. Bowel sounds are normal. He exhibits no distension. There is no tenderness. Musculoskeletal: Normal range of motion. He exhibits no edema or tenderness. Neurological: He is alert and oriented to person, place, and time. No cranial nerve deficit. Skin: Skin is warm and dry. No rash noted. No erythema.    Psychiatric:

## 2018-08-07 ENCOUNTER — TELEPHONE (OUTPATIENT)
Dept: CARDIOLOGY CLINIC | Age: 63
End: 2018-08-07

## 2018-08-07 RX ORDER — METOPROLOL TARTRATE 100 MG/1
TABLET ORAL
Qty: 2 TABLET | Refills: 0 | Status: SHIPPED | OUTPATIENT
Start: 2018-08-07 | End: 2018-10-03

## 2018-08-07 NOTE — TELEPHONE ENCOUNTER
Patient scheduled for 08/08/18 @ 11 with arrival time 1040 at [address] [place] [address]. Please go to the registration desk. NPO at midnight. Patient  Patient instructed to take Metoprolol 100mg the evening before and the morning of the CTA. Patient verbalized understanding.      Allergies   Morphine Rash Low  1/28/2017

## 2018-08-08 ENCOUNTER — HOSPITAL ENCOUNTER (OUTPATIENT)
Dept: CARDIOLOGY | Age: 63
Discharge: HOME | End: 2018-08-15
Attending: INTERNAL MEDICINE | Admitting: INTERNAL MEDICINE

## 2018-08-08 DIAGNOSIS — I48.92 ATRIAL FIBRILLATION AND FLUTTER (HCC): ICD-10-CM

## 2018-08-08 DIAGNOSIS — I48.91 ATRIAL FIBRILLATION AND FLUTTER (HCC): ICD-10-CM

## 2018-08-14 ENCOUNTER — TELEPHONE (OUTPATIENT)
Dept: CARDIOLOGY CLINIC | Age: 63
End: 2018-08-14

## 2018-08-15 LAB — POC CREATININE: 0.8 MG/DL (ref 0.9–1.3)

## 2018-08-15 RX ORDER — 0.9 % SODIUM CHLORIDE 0.9 %
10 VIAL (ML) INJECTION PRN
Status: DISCONTINUED | OUTPATIENT
Start: 2018-08-15 | End: 2018-08-16 | Stop reason: HOSPADM

## 2018-08-15 RX ADMIN — Medication 10 ML: at 12:40

## 2018-08-17 ENCOUNTER — OFFICE VISIT (OUTPATIENT)
Dept: CARDIOLOGY CLINIC | Age: 63
End: 2018-08-17

## 2018-08-17 ENCOUNTER — TELEPHONE (OUTPATIENT)
Dept: CARDIOLOGY CLINIC | Age: 63
End: 2018-08-17

## 2018-08-17 DIAGNOSIS — I48.92 ATRIAL FIBRILLATION AND FLUTTER (HCC): Primary | ICD-10-CM

## 2018-08-17 DIAGNOSIS — I48.91 ATRIAL FIBRILLATION AND FLUTTER (HCC): Primary | ICD-10-CM

## 2018-08-17 PROCEDURE — 99999 PR OFFICE/OUTPT VISIT,PROCEDURE ONLY: CPT | Performed by: INTERNAL MEDICINE

## 2018-08-17 NOTE — TELEPHONE ENCOUNTER
Patient called though he was to be here today for the education class he is set to   Have a ablation 8/22 please call asap

## 2018-08-20 ENCOUNTER — HOSPITAL ENCOUNTER (OUTPATIENT)
Dept: GENERAL RADIOLOGY | Age: 63
Discharge: HOME | End: 2018-08-20
Attending: INTERNAL MEDICINE | Admitting: INTERNAL MEDICINE

## 2018-08-20 DIAGNOSIS — Z01.811 PRE-OP CHEST EXAM: ICD-10-CM

## 2018-08-20 LAB
ANION GAP SERPL CALCULATED.3IONS-SCNC: 13 MMOL/L (ref 4–16)
APTT: 27.8 SECONDS (ref 21.2–33)
BUN BLDV-MCNC: 18 MG/DL (ref 6–23)
CALCIUM SERPL-MCNC: 10.3 MG/DL (ref 8.3–10.6)
CHLORIDE BLD-SCNC: 98 MMOL/L (ref 99–110)
CO2: 27 MMOL/L (ref 21–32)
CREAT SERPL-MCNC: 0.7 MG/DL (ref 0.9–1.3)
GFR AFRICAN AMERICAN: >60 ML/MIN/1.73M2
GFR NON-AFRICAN AMERICAN: >60 ML/MIN/1.73M2
GLUCOSE BLD-MCNC: 157 MG/DL (ref 70–99)
HCT VFR BLD CALC: 39.9 % (ref 42–52)
HEMOGLOBIN: 13.2 GM/DL (ref 13.5–18)
INR BLD: 1.15 INDEX
MAGNESIUM: 1.8 MG/DL (ref 1.8–2.4)
MCH RBC QN AUTO: 32.4 PG (ref 27–31)
MCHC RBC AUTO-ENTMCNC: 33.1 % (ref 32–36)
MCV RBC AUTO: 97.8 FL (ref 78–100)
PDW BLD-RTO: 13.2 % (ref 11.7–14.9)
PHOSPHORUS: 3.7 MG/DL (ref 2.5–4.9)
PLATELET # BLD: 231 K/CU MM (ref 140–440)
PMV BLD AUTO: 10.8 FL (ref 7.5–11.1)
POTASSIUM SERPL-SCNC: 4.4 MMOL/L (ref 3.5–5.1)
PROTHROMBIN TIME: 13.3 SECONDS (ref 9.12–12.5)
RBC # BLD: 4.08 M/CU MM (ref 4.6–6.2)
SODIUM BLD-SCNC: 138 MMOL/L (ref 135–145)
WBC # BLD: 9.2 K/CU MM (ref 4–10.5)

## 2018-08-22 PROBLEM — Z86.79 S/P ABLATION OF ATRIAL FIBRILLATION: Status: ACTIVE | Noted: 2018-08-22

## 2018-08-22 PROBLEM — Z98.890 S/P ABLATION OF ATRIAL FIBRILLATION: Status: ACTIVE | Noted: 2018-08-22

## 2018-08-22 LAB
AVERAGE GLUCOSE: 160
HBA1C MFR BLD: 7.2 %

## 2018-09-09 PROCEDURE — 93299 PR REM INTERROG ICPMS/SCRMS <30 D TECH REVIEW: CPT | Performed by: INTERNAL MEDICINE

## 2018-09-09 PROCEDURE — 93298 REM INTERROG DEV EVAL SCRMS: CPT | Performed by: INTERNAL MEDICINE

## 2018-09-11 NOTE — TELEPHONE ENCOUNTER
Patient called to schedule follow up from procedure with Lieutenant Skelton on 8/20. Also, asking for 1 bottle of Xarelto 20 mg. Patient only has one tablet left and wants to discuss possibly switching to something else.

## 2018-09-13 ENCOUNTER — PROCEDURE VISIT (OUTPATIENT)
Dept: CARDIOLOGY CLINIC | Age: 63
End: 2018-09-13

## 2018-09-13 DIAGNOSIS — R55 SYNCOPE, UNSPECIFIED SYNCOPE TYPE: Primary | ICD-10-CM

## 2018-09-13 DIAGNOSIS — Z45.09 ENCOUNTER FOR ELECTRONIC ANALYSIS OF REVEAL EVENT RECORDER: ICD-10-CM

## 2018-09-27 DIAGNOSIS — I48.0 PAF (PAROXYSMAL ATRIAL FIBRILLATION) (HCC): Primary | ICD-10-CM

## 2018-09-27 LAB
AVERAGE GLUCOSE: ABNORMAL
HBA1C MFR BLD: 7.3 %

## 2018-10-03 ENCOUNTER — OFFICE VISIT (OUTPATIENT)
Dept: CARDIOLOGY CLINIC | Age: 63
End: 2018-10-03
Payer: MEDICARE

## 2018-10-03 VITALS
WEIGHT: 234 LBS | HEART RATE: 90 BPM | BODY MASS INDEX: 32.76 KG/M2 | SYSTOLIC BLOOD PRESSURE: 116 MMHG | DIASTOLIC BLOOD PRESSURE: 64 MMHG | HEIGHT: 71 IN

## 2018-10-03 DIAGNOSIS — I48.0 PAF (PAROXYSMAL ATRIAL FIBRILLATION) (HCC): ICD-10-CM

## 2018-10-03 DIAGNOSIS — Z86.79 S/P ABLATION OF ATRIAL FIBRILLATION: Primary | ICD-10-CM

## 2018-10-03 DIAGNOSIS — I48.91 ATRIAL FIBRILLATION AND FLUTTER (HCC): ICD-10-CM

## 2018-10-03 DIAGNOSIS — Z98.890 S/P ABLATION OF ATRIAL FIBRILLATION: Primary | ICD-10-CM

## 2018-10-03 DIAGNOSIS — I48.92 ATRIAL FIBRILLATION AND FLUTTER (HCC): ICD-10-CM

## 2018-10-03 PROCEDURE — 93000 ELECTROCARDIOGRAM COMPLETE: CPT | Performed by: INTERNAL MEDICINE

## 2018-10-03 PROCEDURE — 1123F ACP DISCUSS/DSCN MKR DOCD: CPT | Performed by: INTERNAL MEDICINE

## 2018-10-03 PROCEDURE — G8484 FLU IMMUNIZE NO ADMIN: HCPCS | Performed by: INTERNAL MEDICINE

## 2018-10-03 PROCEDURE — G8417 CALC BMI ABV UP PARAM F/U: HCPCS | Performed by: INTERNAL MEDICINE

## 2018-10-03 PROCEDURE — 1036F TOBACCO NON-USER: CPT | Performed by: INTERNAL MEDICINE

## 2018-10-03 PROCEDURE — 4040F PNEUMOC VAC/ADMIN/RCVD: CPT | Performed by: INTERNAL MEDICINE

## 2018-10-03 PROCEDURE — 3017F COLORECTAL CA SCREEN DOC REV: CPT | Performed by: INTERNAL MEDICINE

## 2018-10-03 PROCEDURE — 1101F PT FALLS ASSESS-DOCD LE1/YR: CPT | Performed by: INTERNAL MEDICINE

## 2018-10-03 PROCEDURE — G8427 DOCREV CUR MEDS BY ELIG CLIN: HCPCS | Performed by: INTERNAL MEDICINE

## 2018-10-03 PROCEDURE — 99212 OFFICE O/P EST SF 10 MIN: CPT | Performed by: INTERNAL MEDICINE

## 2018-10-29 DIAGNOSIS — I48.91 ATRIAL FIBRILLATION AND FLUTTER (HCC): ICD-10-CM

## 2018-10-29 DIAGNOSIS — I48.92 ATRIAL FIBRILLATION AND FLUTTER (HCC): ICD-10-CM

## 2018-10-29 DIAGNOSIS — I48.0 PAF (PAROXYSMAL ATRIAL FIBRILLATION) (HCC): ICD-10-CM

## 2018-10-29 NOTE — TELEPHONE ENCOUNTER
Patient came in office for samples of xarelto.  Aleksandar Saenz given me the paper to get samples and medication was given to patient Patient and staff

## 2018-11-13 ENCOUNTER — OFFICE VISIT (OUTPATIENT)
Dept: CARDIOLOGY CLINIC | Age: 63
End: 2018-11-13
Payer: MEDICARE

## 2018-11-13 VITALS
HEART RATE: 80 BPM | SYSTOLIC BLOOD PRESSURE: 130 MMHG | WEIGHT: 238.8 LBS | DIASTOLIC BLOOD PRESSURE: 70 MMHG | HEIGHT: 71 IN | BODY MASS INDEX: 33.43 KG/M2

## 2018-11-13 DIAGNOSIS — I48.91 ATRIAL FIBRILLATION AND FLUTTER (HCC): ICD-10-CM

## 2018-11-13 DIAGNOSIS — I48.0 PAF (PAROXYSMAL ATRIAL FIBRILLATION) (HCC): ICD-10-CM

## 2018-11-13 DIAGNOSIS — E11.9 TYPE 2 DIABETES MELLITUS WITHOUT COMPLICATION, WITHOUT LONG-TERM CURRENT USE OF INSULIN (HCC): ICD-10-CM

## 2018-11-13 DIAGNOSIS — R55 SYNCOPE AND COLLAPSE: ICD-10-CM

## 2018-11-13 DIAGNOSIS — I48.92 ATRIAL FIBRILLATION AND FLUTTER (HCC): ICD-10-CM

## 2018-11-13 DIAGNOSIS — Z98.890 S/P ABLATION OF ATRIAL FIBRILLATION: ICD-10-CM

## 2018-11-13 DIAGNOSIS — I10 ESSENTIAL HYPERTENSION: Primary | ICD-10-CM

## 2018-11-13 DIAGNOSIS — E78.5 DYSLIPIDEMIA: ICD-10-CM

## 2018-11-13 DIAGNOSIS — I65.23 BILATERAL CAROTID ARTERY STENOSIS: ICD-10-CM

## 2018-11-13 DIAGNOSIS — Z86.79 S/P ABLATION OF ATRIAL FIBRILLATION: ICD-10-CM

## 2018-11-13 PROCEDURE — G8598 ASA/ANTIPLAT THER USED: HCPCS | Performed by: INTERNAL MEDICINE

## 2018-11-13 PROCEDURE — 1123F ACP DISCUSS/DSCN MKR DOCD: CPT | Performed by: INTERNAL MEDICINE

## 2018-11-13 PROCEDURE — G8427 DOCREV CUR MEDS BY ELIG CLIN: HCPCS | Performed by: INTERNAL MEDICINE

## 2018-11-13 PROCEDURE — 3017F COLORECTAL CA SCREEN DOC REV: CPT | Performed by: INTERNAL MEDICINE

## 2018-11-13 PROCEDURE — 1036F TOBACCO NON-USER: CPT | Performed by: INTERNAL MEDICINE

## 2018-11-13 PROCEDURE — 4040F PNEUMOC VAC/ADMIN/RCVD: CPT | Performed by: INTERNAL MEDICINE

## 2018-11-13 PROCEDURE — 1101F PT FALLS ASSESS-DOCD LE1/YR: CPT | Performed by: INTERNAL MEDICINE

## 2018-11-13 PROCEDURE — 99214 OFFICE O/P EST MOD 30 MIN: CPT | Performed by: INTERNAL MEDICINE

## 2018-11-13 PROCEDURE — 2022F DILAT RTA XM EVC RTNOPTHY: CPT | Performed by: INTERNAL MEDICINE

## 2018-11-13 PROCEDURE — G8484 FLU IMMUNIZE NO ADMIN: HCPCS | Performed by: INTERNAL MEDICINE

## 2018-11-13 PROCEDURE — 3045F PR MOST RECENT HEMOGLOBIN A1C LEVEL 7.0-9.0%: CPT | Performed by: INTERNAL MEDICINE

## 2018-11-13 PROCEDURE — G8417 CALC BMI ABV UP PARAM F/U: HCPCS | Performed by: INTERNAL MEDICINE

## 2018-11-13 NOTE — PROGRESS NOTES
OFFICE PROGRESS NOTES      Elsy Steinberg is a 70 y.o. male who has    CHIEF COMPLAINT AS FOLLOWS:  CHEST PAIN: Patient denies any C/O chest pains at this time. SOB: No C/O SOB at this time. LEG EDEMA: No leg edema   PALPITATIONS: Denies any C/O Palpitations                                  DIZZINESS: No C/O Dizziness   SYNCOPE: None   OTHER:                                     HPI: Patient is here for F/U on his PAF, HTN & Dyslipidemia problems. He does not have any complaints at this time.     Milka Torres has the following history recorded in care path:  Patient Active Problem List    Diagnosis Date Noted    S/P ablation of atrial fibrillation 08/22/2018     Priority: High    Type 2 diabetes mellitus without complication, without long-term current use of insulin ([facility] [address]      Priority: High    Essential hypertension      Priority: High    Compression neuropathy of upper extremity      Priority: High    Syncope and collapse      Priority: High    Right orbital fracture (HCC)      Priority: High    PAF (paroxysmal atrial fibrillation) ([address]      Priority: High    Radial nerve palsy 01/30/2017     Priority: Low    Syncope 01/29/2017     Priority: Low    Facial bones, closed fracture, closed, initial encounter ([address] 01/29/2017     Priority: Low    Radial nerve compression 01/29/2017     Priority: Low    Carotid artery disease ([address] 03/08/2018    Dyslipidemia 03/08/2018    Brachial plexopathy 05/22/2017    Abnormal magnetic resonance imaging study 03/20/2017    Anemia 03/20/2017    Disorder of autonomic nervous system 03/20/2017    Avulsion of cervical nerve root 03/20/2017    BPN (brachial plexus neuropathy) 03/20/2017    Cerebral contusion ([address] 03/20/2017    Cervical nerve root disorder 03/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

## 2018-11-28 ENCOUNTER — TELEPHONE (OUTPATIENT)
Dept: CARDIOLOGY CLINIC | Age: 63
End: 2018-11-28

## 2018-12-21 ENCOUNTER — PROCEDURE VISIT (OUTPATIENT)
Dept: CARDIOLOGY CLINIC | Age: 63
End: 2018-12-21
Payer: MEDICARE

## 2018-12-21 DIAGNOSIS — Z45.09 ENCOUNTER FOR ELECTRONIC ANALYSIS OF REVEAL EVENT RECORDER: ICD-10-CM

## 2018-12-21 DIAGNOSIS — R55 SYNCOPE, UNSPECIFIED SYNCOPE TYPE: Primary | ICD-10-CM

## 2018-12-21 PROCEDURE — 93298 REM INTERROG DEV EVAL SCRMS: CPT | Performed by: INTERNAL MEDICINE

## 2018-12-21 PROCEDURE — 93299 PR REM INTERROG ICPMS/SCRMS <30 D TECH REVIEW: CPT | Performed by: INTERNAL MEDICINE

## 2018-12-28 ENCOUNTER — TELEPHONE (OUTPATIENT)
Dept: CARDIOLOGY CLINIC | Age: 63
End: 2018-12-28

## 2018-12-28 DIAGNOSIS — I48.91 ATRIAL FIBRILLATION AND FLUTTER (HCC): ICD-10-CM

## 2018-12-28 DIAGNOSIS — I48.92 ATRIAL FIBRILLATION AND FLUTTER (HCC): ICD-10-CM

## 2018-12-28 DIAGNOSIS — I48.0 PAF (PAROXYSMAL ATRIAL FIBRILLATION) (HCC): ICD-10-CM

## 2019-01-09 ENCOUNTER — TELEPHONE (OUTPATIENT)
Dept: CARDIOLOGY CLINIC | Age: 64
End: 2019-01-09

## 2019-01-25 ENCOUNTER — OFFICE VISIT (OUTPATIENT)
Dept: CARDIOLOGY CLINIC | Age: 64
End: 2019-01-25
Payer: MEDICARE

## 2019-01-25 VITALS
SYSTOLIC BLOOD PRESSURE: 132 MMHG | DIASTOLIC BLOOD PRESSURE: 60 MMHG | WEIGHT: 243.2 LBS | HEART RATE: 88 BPM | HEIGHT: 71 IN | BODY MASS INDEX: 34.05 KG/M2

## 2019-01-25 DIAGNOSIS — E78.5 DYSLIPIDEMIA: ICD-10-CM

## 2019-01-25 DIAGNOSIS — I48.91 ATRIAL FIBRILLATION AND FLUTTER (HCC): ICD-10-CM

## 2019-01-25 DIAGNOSIS — I48.92 ATRIAL FIBRILLATION AND FLUTTER (HCC): ICD-10-CM

## 2019-01-25 DIAGNOSIS — I48.0 PAF (PAROXYSMAL ATRIAL FIBRILLATION) (HCC): Primary | ICD-10-CM

## 2019-01-25 DIAGNOSIS — I10 ESSENTIAL HYPERTENSION: ICD-10-CM

## 2019-01-25 DIAGNOSIS — I65.23 BILATERAL CAROTID ARTERY STENOSIS: ICD-10-CM

## 2019-01-25 PROCEDURE — 93000 ELECTROCARDIOGRAM COMPLETE: CPT | Performed by: NURSE PRACTITIONER

## 2019-01-25 PROCEDURE — 99213 OFFICE O/P EST LOW 20 MIN: CPT | Performed by: NURSE PRACTITIONER

## 2019-02-05 LAB
AVERAGE GLUCOSE: ABNORMAL
HBA1C MFR BLD: 6.8 %

## 2019-02-22 DIAGNOSIS — I48.0 PAF (PAROXYSMAL ATRIAL FIBRILLATION) (HCC): ICD-10-CM

## 2019-02-22 DIAGNOSIS — I48.92 ATRIAL FIBRILLATION AND FLUTTER (HCC): ICD-10-CM

## 2019-02-22 DIAGNOSIS — I48.91 ATRIAL FIBRILLATION AND FLUTTER (HCC): ICD-10-CM

## 2019-03-20 ENCOUNTER — TELEPHONE (OUTPATIENT)
Dept: CARDIOLOGY CLINIC | Age: 64
End: 2019-03-20

## 2019-03-20 DIAGNOSIS — I48.92 ATRIAL FIBRILLATION AND FLUTTER (HCC): ICD-10-CM

## 2019-03-20 DIAGNOSIS — I48.91 ATRIAL FIBRILLATION AND FLUTTER (HCC): ICD-10-CM

## 2019-03-20 DIAGNOSIS — I48.0 PAF (PAROXYSMAL ATRIAL FIBRILLATION) (HCC): ICD-10-CM

## 2019-04-17 DIAGNOSIS — I48.92 ATRIAL FIBRILLATION AND FLUTTER (HCC): ICD-10-CM

## 2019-04-17 DIAGNOSIS — I48.0 PAF (PAROXYSMAL ATRIAL FIBRILLATION) (HCC): ICD-10-CM

## 2019-04-17 DIAGNOSIS — I48.91 ATRIAL FIBRILLATION AND FLUTTER (HCC): ICD-10-CM

## 2019-04-26 DIAGNOSIS — E78.5 HYPERLIPIDEMIA, UNSPECIFIED HYPERLIPIDEMIA TYPE: ICD-10-CM

## 2019-04-26 DIAGNOSIS — Z92.89 HISTORY OF DOPPLER ULTRASOUND: ICD-10-CM

## 2019-04-26 DIAGNOSIS — Z92.89 HISTORY OF ECHOCARDIOGRAM: ICD-10-CM

## 2019-04-26 DIAGNOSIS — Z92.89 HISTORY OF CARDIOVASCULAR STRESS TEST: ICD-10-CM

## 2019-04-26 RX ORDER — SILDENAFIL 50 MG/1
50 TABLET, FILM COATED ORAL PRN
COMMUNITY
End: 2019-08-13

## 2019-05-15 ENCOUNTER — OFFICE VISIT (OUTPATIENT)
Dept: CARDIOLOGY CLINIC | Age: 64
End: 2019-05-15
Payer: MEDICARE

## 2019-05-15 VITALS
BODY MASS INDEX: 33.74 KG/M2 | SYSTOLIC BLOOD PRESSURE: 138 MMHG | HEART RATE: 81 BPM | RESPIRATION RATE: 12 BRPM | WEIGHT: 241 LBS | DIASTOLIC BLOOD PRESSURE: 68 MMHG | HEIGHT: 71 IN

## 2019-05-15 DIAGNOSIS — I48.0 PAF (PAROXYSMAL ATRIAL FIBRILLATION) (HCC): Primary | ICD-10-CM

## 2019-05-15 DIAGNOSIS — I48.92 ATRIAL FIBRILLATION AND FLUTTER (HCC): ICD-10-CM

## 2019-05-15 DIAGNOSIS — I48.0 PAF (PAROXYSMAL ATRIAL FIBRILLATION) (HCC): ICD-10-CM

## 2019-05-15 DIAGNOSIS — I48.91 ATRIAL FIBRILLATION AND FLUTTER (HCC): ICD-10-CM

## 2019-05-15 DIAGNOSIS — E78.5 DYSLIPIDEMIA: ICD-10-CM

## 2019-05-15 DIAGNOSIS — I65.23 BILATERAL CAROTID ARTERY STENOSIS: ICD-10-CM

## 2019-05-15 DIAGNOSIS — I10 ESSENTIAL HYPERTENSION: ICD-10-CM

## 2019-05-15 PROCEDURE — 4040F PNEUMOC VAC/ADMIN/RCVD: CPT | Performed by: NURSE PRACTITIONER

## 2019-05-15 PROCEDURE — 1036F TOBACCO NON-USER: CPT | Performed by: NURSE PRACTITIONER

## 2019-05-15 PROCEDURE — 3017F COLORECTAL CA SCREEN DOC REV: CPT | Performed by: NURSE PRACTITIONER

## 2019-05-15 PROCEDURE — 1123F ACP DISCUSS/DSCN MKR DOCD: CPT | Performed by: NURSE PRACTITIONER

## 2019-05-15 PROCEDURE — G8598 ASA/ANTIPLAT THER USED: HCPCS | Performed by: NURSE PRACTITIONER

## 2019-05-15 PROCEDURE — 99214 OFFICE O/P EST MOD 30 MIN: CPT | Performed by: NURSE PRACTITIONER

## 2019-05-15 PROCEDURE — G8427 DOCREV CUR MEDS BY ELIG CLIN: HCPCS | Performed by: NURSE PRACTITIONER

## 2019-05-15 PROCEDURE — G8417 CALC BMI ABV UP PARAM F/U: HCPCS | Performed by: NURSE PRACTITIONER

## 2019-05-15 PROCEDURE — 93000 ELECTROCARDIOGRAM COMPLETE: CPT | Performed by: INTERNAL MEDICINE

## 2019-05-15 NOTE — PROGRESS NOTES
[address], [address] [facility],[address]  Phone: [phone]    Fax [phone]                  Tutu Pope MD, Cuba Dan MD, [address] [facility], MD, MD Giulia Jeffers MD Olevia Chancellor, MD  Tapan Zhao, [address] [place] [address], [facility]      5/15/2019    RE: Radhika Nails  (1955)                               TO:  Dr. Abbe Hayes MD  The primary cardiologist is Dr. Oneal Cabral     CC: atrial fib - HTN- HLD     HPI:    Thank you for involving me in taking care of your patient Radhika Nails. He is a 75y.o. year old male with a history of atrial fib - HTN- HLD and is being seen in the office today. He reports that is feeling well. There is no chest pain or SOB. There are no reported palpitations or dizziness. He is compliant with medications. He is active with walking for exercise. Vitals:    05/15/19 0840   BP: 138/68   Pulse: 81   Resp: 12       Current Outpatient Medications   Medication Sig Dispense Refill    sildenafil (VIAGRA) 50 MG tablet Take 50 mg by mouth as needed for Erectile Dysfunction      insulin glargine (TOUJEO SOLOSTAR) 300 UNIT/ML injection pen Inject 18 Units into the skin nightly      insulin detemir (LEVEMIR) 100 UNIT/ML injection vial Inject into the skin nightly      pantoprazole (PROTONIX) 40 MG tablet Take 1 tablet by mouth daily 30 tablet 3    terbinafine (LAMISIL) 1 % cream Apply topically 2 times daily Apply topically 2 times daily.       gabapentin (NEURONTIN) 300 MG capsule       Blood Glucose Monitoring Suppl (ONETOUCH VERIO IQ SYSTEM) W/DEVICE KIT       ONETOUCH VERIO strip       BD PEN NEEDLE RADU U/F 32G X 4 MM MISC       ONETOUCH DELICA LANCETS FINE MISC       metFORMIN (GLUCOPHAGE) 500 MG tablet Take 1,000 mg by mouth daily (with breakfast)       CARTIA  MG extended release capsule Take 360 mg by mouth every evening       glimepiride (AMARYL) 4 MG tablet Take 4 mg by mouth 2 times daily (with meals)       hydrochlorothiazide (HYDRODIURIL) 25 MG tablet Take 25 mg by mouth every evening       levothyroxine (SYNTHROID) 100 MCG tablet Take 100 mcg by mouth every evening       lisinopril (PRINIVIL;ZESTRIL) 40 MG tablet Take 40 mg by mouth every evening       PARoxetine (PAXIL) 20 MG tablet Take 20 mg by mouth every evening       pioglitazone (ACTOS) 15 MG tablet Take 15 mg by mouth every evening       simvastatin (ZOCOR) 20 MG tablet Take 20 mg by mouth every evening       rivaroxaban (XARELTO) 20 MG TABS tablet Take 1 tablet by mouth daily (with breakfast) for 28 days 28 tablet 0    aspirin 81 MG tablet Take 81 mg by mouth daily       No current facility-administered medications for this visit. Allergies: Morphine  Past Medical History:   Diagnosis Date    Arrhythmia     Atrial fibrillation ([address]     Carotid Doppler 03/21/2018    Mild (0-49%) disease of the Bilateral Internal carotid artery. No hemodynamically significant stenosis noted. Normal vertebral flow. 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 No reversible myocardial ischemia seen. Uneventful pharmacological     History of echocardiogram 01/30/2017    Ejection fraction is visually estimated at 55%. mildly Increased LVOT velocity. Aortic valve leaflets are somewhat thickened. Aortic valve appears tricuspid. Trivial aortic regurgitation is noted. Mitral annular calcification is present. Mild calcification of the anterior leaflet of the mitral valve.     Hyperlipidemia     Hypertension      Past Surgical History:   Procedure Laterality Date    OTHER SURGICAL HISTORY  08/22/2018    Atrial Fib cardiac Ablation     Family History   Problem Relation Age of Onset    Heart Disease Father      Social History     Tobacco Use    Smoking status: Never Smoker    Smokeless tobacco: Never Used   Substance Use Topics    Alcohol use: Yes        Review of Systems:   · Constitutional: No Fever,no unintentional weight Loss   · Eyes: No change in Vision:     · ENT: No Headaches, Hearing Loss or Vertigo. No tinnitus   · Cardiovascular: as per note above   · Respiratory: No cough or wheezing and as per note above. · Gastrointestinal: no abdominal pain, no appetite loss, no blood in stools, constipation, or diarrhea, Yes heartburn  · Genitourinary: No dysuria, trouble voiding, or hematuria  · Musculoskeletal: back pain- No: myalgia No,  Arthralgia- Yes  · Integumentary: No rash or pruritis  · Neurological: No TIA or stroke symptoms  · Psychiatric: Anxiety- No: depression-  Yes   · Endocrine: No malaise-No, fatigue No,- no temperature intolerance  · Hematologic/Lymphatic: No bleeding problems, blood clots or swollen lymph nodes  · Allergic/Immunologic: No nasal congestion or hives    Objective:      Physical Exam:  /68   Pulse 81   Resp 12   Ht 5' 11\" (1.803 m)   Wt 241 lb (109.3 kg)   BMI 33.61 kg/m²   Wt Readings from Last 3 Encounters:   05/15/19 241 lb (109.3 kg)   01/25/19 243 lb 3.2 oz (110.3 kg)   11/13/18 238 lb 12.8 oz (108.3 kg)     Body mass index is 33.61 kg/m². GENERAL - Alert, oriented, pleasant, in no apparent distress. Head unremarkable  Eyes - pupils equal and reactive to light - bilateral conjunctiva are pink: sclera are white   ENT - external ears intact, nose is intact:  tongue is midline pink and moist  Neck - No jugular venous distention noted. No carotid bruits appreciated. Cardiovascular - Normal S1 and S2:  murmur appreciated No, No gallop. Regular rate- Yes,  rhythm regular-Yes. Extremities - No cyanosis, clubbing, no edema to lower legs. Pulmonary - No respiratory distress. No wheezes or rales.  Chest is clear  Pulses: Bilateral radial pulses normal  Abdomen -  Soft no tenderness, non distended   Musculoskeletal - Normal movement of all extremities   Neurologic - alert and oriented: There are no gross focal neurologic abnormalities. Skin-  No rash: No echymosis   Affect- normal mood and pleasant     DATA:  Lab Results   Component Value Date    CKTOTAL 254 01/28/2017    TROPONINI 0.010 07/30/2011     BNP:    Lab Results   Component Value Date    BNP 92 07/30/2011     PT/INR:  No results found for: PTINR  Lab Results   Component Value Date    LABA1C 6.8 (H) 02/04/2019    LABA1C 7.3 (H) 09/27/2018     Lab Results   Component Value Date    CHOL 113 04/23/2018    CHOL 143 04/23/2018    TRIG 81 04/23/2018    TRIG 81 04/23/2018    HDL 58 04/23/2018    HDL 58 04/23/2018    LDLCALC 69 04/23/2018    LDLCALC 69 04/23/2018    LDLDIRECT 137 (H) 07/20/2011     Lab Results   Component Value Date    ALT 44 04/23/2018    ALT 44 04/23/2018    AST 41 04/23/2018    AST 41 04/23/2018     TSH:  No results found for: TSH      Assessment/ Plan:    Patient seen, interviewed and examined. Testing was reviewed. HTN    Controlled  Current medications include: hydrodiuril   He is to continue current medications   advised low salt diet   Testing ordered:  no     Hyperlipidemia  At or near goal Yes  No recent labs available- will request copy from PCP  Current medications include: simvastatin  He is to continue current medications      Atrial fib   H/o ablation   Rate controlled yes. EKG today is SR rate 81    He is  on anticoagulation. Current medications include: xarelto cartia   He is to continue current medications       Loop recorder   Stable readings   No atrial fib    DM   Under control- on medications    Patient is encouraged to exercise. Lifestyle and risk factor modificatons discussed. Various goals are discussed and questions answered. Continue current medications. Appropriate prescriptions are addressed. Questions answered and patient verbalizes understanding. Call for any problems, questions, or concerns.

## 2019-05-20 ENCOUNTER — OFFICE VISIT (OUTPATIENT)
Dept: FAMILY MEDICINE CLINIC | Age: 64
End: 2019-05-20
Payer: MEDICARE

## 2019-05-20 VITALS
BODY MASS INDEX: 33.77 KG/M2 | SYSTOLIC BLOOD PRESSURE: 138 MMHG | HEART RATE: 89 BPM | DIASTOLIC BLOOD PRESSURE: 80 MMHG | WEIGHT: 241.2 LBS | HEIGHT: 71 IN | OXYGEN SATURATION: 98 %

## 2019-05-20 DIAGNOSIS — F32.A DEPRESSION, UNSPECIFIED DEPRESSION TYPE: ICD-10-CM

## 2019-05-20 DIAGNOSIS — I10 ESSENTIAL HYPERTENSION: Primary | ICD-10-CM

## 2019-05-20 DIAGNOSIS — E78.2 MIXED HYPERLIPIDEMIA: ICD-10-CM

## 2019-05-20 DIAGNOSIS — E03.9 HYPOTHYROIDISM, UNSPECIFIED TYPE: ICD-10-CM

## 2019-05-20 DIAGNOSIS — E11.9 TYPE 2 DIABETES MELLITUS WITHOUT COMPLICATION, WITHOUT LONG-TERM CURRENT USE OF INSULIN (HCC): ICD-10-CM

## 2019-05-20 PROCEDURE — 99213 OFFICE O/P EST LOW 20 MIN: CPT | Performed by: PHYSICIAN ASSISTANT

## 2019-05-20 ASSESSMENT — PATIENT HEALTH QUESTIONNAIRE - PHQ9
SUM OF ALL RESPONSES TO PHQ QUESTIONS 1-9: 0
SUM OF ALL RESPONSES TO PHQ9 QUESTIONS 1 & 2: 0
1. LITTLE INTEREST OR PLEASURE IN DOING THINGS: 0
2. FEELING DOWN, DEPRESSED OR HOPELESS: 0
SUM OF ALL RESPONSES TO PHQ QUESTIONS 1-9: 0

## 2019-05-20 NOTE — PROGRESS NOTES
5/20/2019    [facility]    Chief Complaint   Patient presents with    3 Month Follow-Up       HPI  History was obtained from the patient. Narcisa Moore is a 75 y.o. male who presents today for follow-up of depression. Patient states he feels great and his current dose of paroxetine. No Ideas of hurting himself or others. He enjoys life on a daily basis. Patient states he's been watching his sugars and they're normal.  No hypo-or hyperglycemia. Patient taking his blood pressure medications as well and does not feel as if his pressure has been too high or too low. REVIEW OF SYMPTOMS    Constitutional:  Denies fever, chills, weight loss or weakness  Eyes:  Denies photophobia or discharge  ENT:  Denies sore throat or ear pain  Cardiovascular:  Denies chest pain, palpitations or swelling  Respiratory:  Denies cough or shortness of breath  GI:  Denies abdominal pain, nausea, vomiting, or diarrhea  Skin:  No rashes  Neurologic:  Denies headache, focal weakness, or sensory changes  Endocrine:  Denies polyuria or polydipsia  Lymphatic:  Denies swollen glands  Psychiatric:  Denies depression, suicidal ideation or homicidal ideation  All symptoms negative except as marked. PAST MEDICAL HISTORY  Past Medical History:   Diagnosis Date    Arrhythmia     Atrial fibrillation ([facility] [address]     Carotid Doppler 03/21/2018    Mild (0-49%) disease of the Bilateral Internal carotid artery. No hemodynamically significant stenosis noted. Normal vertebral flow. 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 No reversible myocardial ischemia seen. Uneventful pharmacological     History of echocardiogram 01/30/2017    Ejection fraction is visually estimated at 55%. mildly Increased LVOT velocity. Aortic valve leaflets are somewhat thickened. Aortic valve appears tricuspid. Trivial aortic regurgitation is noted. Mitral annular calcification is present. Mild RADU U/F 32G X 4 MM MISC       ONETOUCH DELICA LANCETS FINE MISC       metFORMIN (GLUCOPHAGE) 500 MG tablet Take 2,000 mg by mouth daily (with breakfast)       CARTIA  MG extended release capsule Take 360 mg by mouth every evening       glimepiride (AMARYL) 4 MG tablet Take 4 mg by mouth 2 times daily (with meals)       hydrochlorothiazide (HYDRODIURIL) 25 MG tablet Take 25 mg by mouth every evening       levothyroxine (SYNTHROID) 100 MCG tablet Take 100 mcg by mouth every evening       lisinopril (PRINIVIL;ZESTRIL) 40 MG tablet Take 40 mg by mouth every evening       PARoxetine (PAXIL) 30 MG tablet Take 30 mg by mouth every evening       pioglitazone (ACTOS) 15 MG tablet Take 15 mg by mouth every evening       simvastatin (ZOCOR) 20 MG tablet Take 20 mg by mouth every evening        No current facility-administered medications for this visit.         ALLERGIES  Allergies   Allergen Reactions    Morphine Rash       PHYSICAL EXAM    /80   Pulse 89   Ht 5' 11\" (1.803 m)   Wt 241 lb 3.2 oz (109.4 kg)   SpO2 98%   BMI 33.64 kg/m²     Constitutional:  Well developed, well nourished  HENT:  Normocephalic, atraumatic, bilateral external ears normal, oropharynx moist, nose normal  Eyes:  PERRLA, EOMI, conjunctiva normal, no discharge, no scleral icterus  Neck:  Normal range of motion, no tenderness, supple  Lymphatic:  No lymphadenopathy noted  Cardiovascular:  Normal heart rate, normal rhythm, 2/6 systolic murmur noted, no gallops or rubs  Thorax & Lungs:  Normal breath sounds, no respiratory distress, no wheezing  Abdomen:  Soft, no tenderness, no masses, no pulsatile masses, obese, not distended, bowel sounds normal  Skin:  Warm, dry, no erythema, no rash  Extremities:  No edema, no tenderness, no cyanosis, no clubbing  Musculoskeletal:  Good range of motion  all major joints, no tenderness to palpation or major deformities noted  Neurologic:  Alert & oriented X 3,  Psychiatric:  Affect normal, mood normal    ASSESSMENT & PLAN    Pili Bran was seen today for 3 month follow-up. Diagnoses and all orders for this visit:    Essential hypertension  -     CBC Auto Differential; Future  -     Comprehensive Metabolic Panel; Future    Depression, unspecified depression type    Mixed hyperlipidemia  -     Lipid Panel; Future    Type 2 diabetes mellitus without complication, without long-term current use of insulin (HCC)  -     Hemoglobin A1C; Future  -     Microalbumin / Creatinine Urine Ratio; Future    Hypothyroidism, unspecified type  -     TSH without Reflex; Future         Medications Discontinued During This Encounter   Medication Reason    gabapentin (NEURONTIN) 300 MG capsule LIST CLEANUP    insulin glargine (TOUJEO SOLOSTAR) 300 UNIT/ML injection pen LIST CLEANUP    pantoprazole (PROTONIX) 40 MG tablet LIST CLEANUP        Return in about 3 months (around 8/20/2019). Discussed colon cancer screening patient refuses. He states he will think about it. He does not even wish to do the Cologard at this time. He will continue to try to watch  low-cholesterol diet. He will try to get more active this summer. He will return in 3 months and prior to that do the lab work as above. Prior to his return he will get his diabetic eye exam completed and make sure a copy of the results     Plan of care reviewed with patient who verbalizes understanding and wishes to continue. Patient verbalizes understanding of the above plan and is in agreement and will call with worsening symptoms questions or concerns. Please note that this chart was generated using dragon dictation software. Although every effort was made to ensure the accuracy of this automated transcription, some errors in transcription may have occurred.     Electronically signed by Sabine Hurd PA-C on 5/20/2019

## 2019-05-22 ENCOUNTER — TELEPHONE (OUTPATIENT)
Dept: CARDIOLOGY CLINIC | Age: 64
End: 2019-05-22

## 2019-05-22 NOTE — TELEPHONE ENCOUNTER
Called patient to advise loop recorder and monitor not communicating. I left message asking that he send transmission to link them. Advised him to call the office or call [employer] stay connected at [phone].

## 2019-05-28 RX ORDER — PAROXETINE 30 MG/1
TABLET, FILM COATED ORAL
Qty: 90 TABLET | Refills: 0 | Status: SHIPPED | OUTPATIENT
Start: 2019-05-28 | End: 2019-08-13 | Stop reason: SDUPTHER

## 2019-06-06 ENCOUNTER — PROCEDURE VISIT (OUTPATIENT)
Dept: CARDIOLOGY CLINIC | Age: 64
End: 2019-06-06
Payer: MEDICARE

## 2019-06-06 DIAGNOSIS — Z45.09 ENCOUNTER FOR ELECTRONIC ANALYSIS OF REVEAL EVENT RECORDER: ICD-10-CM

## 2019-06-06 DIAGNOSIS — R55 SYNCOPE, UNSPECIFIED SYNCOPE TYPE: Primary | ICD-10-CM

## 2019-06-10 PROCEDURE — 93298 REM INTERROG DEV EVAL SCRMS: CPT | Performed by: INTERNAL MEDICINE

## 2019-06-10 PROCEDURE — 93299 PR REM INTERROG ICPMS/SCRMS <30 D TECH REVIEW: CPT | Performed by: INTERNAL MEDICINE

## 2019-06-14 ENCOUNTER — TELEPHONE (OUTPATIENT)
Dept: CARDIOLOGY CLINIC | Age: 64
End: 2019-06-14

## 2019-06-14 DIAGNOSIS — I48.92 ATRIAL FIBRILLATION AND FLUTTER (HCC): ICD-10-CM

## 2019-06-14 DIAGNOSIS — I48.91 ATRIAL FIBRILLATION AND FLUTTER (HCC): ICD-10-CM

## 2019-06-14 DIAGNOSIS — I48.0 PAF (PAROXYSMAL ATRIAL FIBRILLATION) (HCC): ICD-10-CM

## 2019-07-08 ENCOUNTER — TELEPHONE (OUTPATIENT)
Dept: CARDIOLOGY CLINIC | Age: 64
End: 2019-07-08

## 2019-07-08 DIAGNOSIS — I48.91 ATRIAL FIBRILLATION AND FLUTTER (HCC): ICD-10-CM

## 2019-07-08 DIAGNOSIS — I48.0 PAF (PAROXYSMAL ATRIAL FIBRILLATION) (HCC): ICD-10-CM

## 2019-07-08 DIAGNOSIS — I48.92 ATRIAL FIBRILLATION AND FLUTTER (HCC): ICD-10-CM

## 2019-07-31 RX ORDER — LISINOPRIL 40 MG/1
TABLET ORAL
Qty: 90 TABLET | Refills: 0 | Status: SHIPPED | OUTPATIENT
Start: 2019-07-31 | End: 2019-08-13 | Stop reason: SDUPTHER

## 2019-07-31 RX ORDER — SIMVASTATIN 20 MG
TABLET ORAL
Qty: 90 TABLET | Refills: 0 | Status: SHIPPED | OUTPATIENT
Start: 2019-07-31 | End: 2019-11-22 | Stop reason: SDUPTHER

## 2019-07-31 RX ORDER — METFORMIN HYDROCHLORIDE 500 MG/1
TABLET, EXTENDED RELEASE ORAL
Qty: 360 TABLET | Refills: 0 | Status: SHIPPED | OUTPATIENT
Start: 2019-07-31 | End: 2019-08-13 | Stop reason: SDUPTHER

## 2019-08-06 ENCOUNTER — TELEPHONE (OUTPATIENT)
Dept: CARDIOLOGY CLINIC | Age: 64
End: 2019-08-06

## 2019-08-06 DIAGNOSIS — I48.91 ATRIAL FIBRILLATION AND FLUTTER (HCC): ICD-10-CM

## 2019-08-06 DIAGNOSIS — I48.0 PAF (PAROXYSMAL ATRIAL FIBRILLATION) (HCC): ICD-10-CM

## 2019-08-06 DIAGNOSIS — I48.92 ATRIAL FIBRILLATION AND FLUTTER (HCC): ICD-10-CM

## 2019-08-13 ENCOUNTER — OFFICE VISIT (OUTPATIENT)
Dept: FAMILY MEDICINE CLINIC | Age: 64
End: 2019-08-13
Payer: MEDICARE

## 2019-08-13 VITALS
BODY MASS INDEX: 34.1 KG/M2 | WEIGHT: 243.6 LBS | HEIGHT: 71 IN | DIASTOLIC BLOOD PRESSURE: 74 MMHG | HEART RATE: 76 BPM | SYSTOLIC BLOOD PRESSURE: 142 MMHG

## 2019-08-13 DIAGNOSIS — I48.92 ATRIAL FIBRILLATION AND FLUTTER (HCC): ICD-10-CM

## 2019-08-13 DIAGNOSIS — Z23 NEED FOR PROPHYLACTIC VACCINATION AND INOCULATION AGAINST VARICELLA: ICD-10-CM

## 2019-08-13 DIAGNOSIS — Z13.220 SCREENING FOR HYPERLIPIDEMIA: ICD-10-CM

## 2019-08-13 DIAGNOSIS — I10 ESSENTIAL HYPERTENSION: ICD-10-CM

## 2019-08-13 DIAGNOSIS — E11.9 TYPE 2 DIABETES MELLITUS WITHOUT COMPLICATION, WITHOUT LONG-TERM CURRENT USE OF INSULIN (HCC): ICD-10-CM

## 2019-08-13 DIAGNOSIS — I10 ESSENTIAL HYPERTENSION: Primary | ICD-10-CM

## 2019-08-13 DIAGNOSIS — Z12.11 SCREEN FOR COLON CANCER: ICD-10-CM

## 2019-08-13 DIAGNOSIS — Z12.11 SCREENING FOR COLON CANCER: ICD-10-CM

## 2019-08-13 DIAGNOSIS — I48.0 PAF (PAROXYSMAL ATRIAL FIBRILLATION) (HCC): ICD-10-CM

## 2019-08-13 DIAGNOSIS — M17.11 OSTEOARTHRITIS OF RIGHT KNEE, UNSPECIFIED OSTEOARTHRITIS TYPE: ICD-10-CM

## 2019-08-13 DIAGNOSIS — I48.91 ATRIAL FIBRILLATION AND FLUTTER (HCC): ICD-10-CM

## 2019-08-13 LAB
A/G RATIO: 1.6 (ref 1.1–2.2)
ALBUMIN SERPL-MCNC: 4.4 G/DL (ref 3.4–5)
ALP BLD-CCNC: 100 U/L (ref 40–129)
ALT SERPL-CCNC: 22 U/L (ref 10–40)
ANION GAP SERPL CALCULATED.3IONS-SCNC: 15 MMOL/L (ref 3–16)
AST SERPL-CCNC: 29 U/L (ref 15–37)
BILIRUB SERPL-MCNC: <0.2 MG/DL (ref 0–1)
BUN BLDV-MCNC: 30 MG/DL (ref 7–20)
CALCIUM SERPL-MCNC: 10.2 MG/DL (ref 8.3–10.6)
CHLORIDE BLD-SCNC: 94 MMOL/L (ref 99–110)
CHOLESTEROL, TOTAL: 134 MG/DL (ref 0–199)
CO2: 29 MMOL/L (ref 21–32)
CREAT SERPL-MCNC: 1 MG/DL (ref 0.8–1.3)
GFR AFRICAN AMERICAN: >60
GFR NON-AFRICAN AMERICAN: >60
GLOBULIN: 2.8 G/DL
GLUCOSE BLD-MCNC: 180 MG/DL (ref 70–99)
HDLC SERPL-MCNC: 52 MG/DL (ref 40–60)
LDL CHOLESTEROL CALCULATED: 67 MG/DL
POTASSIUM SERPL-SCNC: 4.9 MMOL/L (ref 3.5–5.1)
SODIUM BLD-SCNC: 138 MMOL/L (ref 136–145)
TOTAL PROTEIN: 7.2 G/DL (ref 6.4–8.2)
TRIGL SERPL-MCNC: 76 MG/DL (ref 0–150)
VLDLC SERPL CALC-MCNC: 15 MG/DL

## 2019-08-13 PROCEDURE — 3044F HG A1C LEVEL LT 7.0%: CPT | Performed by: FAMILY MEDICINE

## 2019-08-13 PROCEDURE — 1036F TOBACCO NON-USER: CPT | Performed by: FAMILY MEDICINE

## 2019-08-13 PROCEDURE — 4040F PNEUMOC VAC/ADMIN/RCVD: CPT | Performed by: FAMILY MEDICINE

## 2019-08-13 PROCEDURE — G8598 ASA/ANTIPLAT THER USED: HCPCS | Performed by: FAMILY MEDICINE

## 2019-08-13 PROCEDURE — 20610 DRAIN/INJ JOINT/BURSA W/O US: CPT | Performed by: FAMILY MEDICINE

## 2019-08-13 PROCEDURE — 2022F DILAT RTA XM EVC RTNOPTHY: CPT | Performed by: FAMILY MEDICINE

## 2019-08-13 PROCEDURE — G8417 CALC BMI ABV UP PARAM F/U: HCPCS | Performed by: FAMILY MEDICINE

## 2019-08-13 PROCEDURE — 1123F ACP DISCUSS/DSCN MKR DOCD: CPT | Performed by: FAMILY MEDICINE

## 2019-08-13 PROCEDURE — 99213 OFFICE O/P EST LOW 20 MIN: CPT | Performed by: FAMILY MEDICINE

## 2019-08-13 PROCEDURE — 3017F COLORECTAL CA SCREEN DOC REV: CPT | Performed by: FAMILY MEDICINE

## 2019-08-13 PROCEDURE — G8427 DOCREV CUR MEDS BY ELIG CLIN: HCPCS | Performed by: FAMILY MEDICINE

## 2019-08-13 RX ORDER — METHYLPREDNISOLONE ACETATE 80 MG/ML
80 INJECTION, SUSPENSION INTRA-ARTICULAR; INTRALESIONAL; INTRAMUSCULAR; SOFT TISSUE ONCE
Status: COMPLETED | OUTPATIENT
Start: 2019-08-13 | End: 2019-08-13

## 2019-08-13 RX ORDER — LEVOTHYROXINE SODIUM 0.1 MG/1
100 TABLET ORAL EVERY EVENING
Qty: 90 TABLET | Refills: 1 | Status: SHIPPED | OUTPATIENT
Start: 2019-08-13 | End: 2020-05-01

## 2019-08-13 RX ORDER — PIOGLITAZONEHYDROCHLORIDE 15 MG/1
15 TABLET ORAL EVERY EVENING
Qty: 90 TABLET | Refills: 1 | Status: SHIPPED | OUTPATIENT
Start: 2019-08-13 | End: 2020-03-25

## 2019-08-13 RX ORDER — LISINOPRIL 40 MG/1
40 TABLET ORAL DAILY
Qty: 90 TABLET | Refills: 1 | Status: SHIPPED | OUTPATIENT
Start: 2019-08-13 | End: 2020-05-01

## 2019-08-13 RX ORDER — PAROXETINE 30 MG/1
30 TABLET, FILM COATED ORAL EVERY MORNING
Qty: 90 TABLET | Refills: 1 | Status: SHIPPED | OUTPATIENT
Start: 2019-08-13 | End: 2020-02-27

## 2019-08-13 RX ORDER — HYDROCHLOROTHIAZIDE 25 MG/1
25 TABLET ORAL EVERY EVENING
Qty: 90 TABLET | Refills: 1 | Status: SHIPPED | OUTPATIENT
Start: 2019-08-13 | End: 2020-04-13

## 2019-08-13 RX ORDER — METFORMIN HYDROCHLORIDE 500 MG/1
2000 TABLET, EXTENDED RELEASE ORAL
Qty: 360 TABLET | Refills: 1 | Status: SHIPPED | OUTPATIENT
Start: 2019-08-13 | End: 2020-05-01

## 2019-08-13 RX ORDER — GLIMEPIRIDE 4 MG/1
4 TABLET ORAL 2 TIMES DAILY WITH MEALS
Qty: 90 TABLET | Refills: 1 | Status: SHIPPED | OUTPATIENT
Start: 2019-08-13 | End: 2020-04-13

## 2019-08-13 RX ORDER — DILTIAZEM HYDROCHLORIDE 180 MG/1
360 CAPSULE, EXTENDED RELEASE ORAL EVERY EVENING
Qty: 90 CAPSULE | Refills: 1 | Status: SHIPPED | OUTPATIENT
Start: 2019-08-13 | End: 2020-03-25

## 2019-08-13 RX ADMIN — METHYLPREDNISOLONE ACETATE 80 MG: 80 INJECTION, SUSPENSION INTRA-ARTICULAR; INTRALESIONAL; INTRAMUSCULAR; SOFT TISSUE at 16:42

## 2019-08-13 ASSESSMENT — ENCOUNTER SYMPTOMS
CHEST TIGHTNESS: 0
EYES NEGATIVE: 1
ABDOMINAL PAIN: 0
RESPIRATORY NEGATIVE: 1
WHEEZING: 0
SHORTNESS OF BREATH: 0
COUGH: 0

## 2019-08-13 NOTE — PROGRESS NOTES
Other Topics Concern    Not on file   Social History Narrative    Not on file        SURGICAL HISTORY  Past Surgical History:   Procedure Laterality Date    OTHER SURGICAL HISTORY  08/22/2018    Atrial Fib cardiac Ablation       CURRENT MEDICATIONS  Current Outpatient Medications   Medication Sig Dispense Refill    zoster recombinant adjuvanted vaccine (SHINGRIX) 50 MCG/0.5ML SUSR injection Inject 0.5 mLs into the muscle once for 1 dose 50 MCG IM then repeat 2-6 months. 1 each 1    lisinopril (PRINIVIL;ZESTRIL) 40 MG tablet Take 1 tablet by mouth daily 90 tablet 1    metFORMIN (GLUCOPHAGE-XR) 500 MG extended release tablet Take 4 tablets by mouth daily (with breakfast) 360 tablet 1    PARoxetine (PAXIL) 30 MG tablet Take 1 tablet by mouth every morning 90 tablet 1    rivaroxaban (XARELTO) 20 MG TABS tablet Take 1 tablet by mouth daily (with breakfast) for 28 days 28 tablet 0    CARTIA  MG extended release capsule Take 2 capsules by mouth every evening 90 capsule 1    glimepiride (AMARYL) 4 MG tablet Take 1 tablet by mouth 2 times daily (with meals) 90 tablet 1    hydrochlorothiazide (HYDRODIURIL) 25 MG tablet Take 1 tablet by mouth every evening 90 tablet 1    insulin detemir (LEVEMIR) 100 UNIT/ML injection vial Inject 10 Units into the skin nightly 3 vial 1    levothyroxine (SYNTHROID) 100 MCG tablet Take 1 tablet by mouth every evening 90 tablet 1    pioglitazone (ACTOS) 15 MG tablet Take 1 tablet by mouth every evening 90 tablet 1    simvastatin (ZOCOR) 20 MG tablet TAKE ONE TABLET BY MOUTH DAILY 90 tablet 0    aspirin 81 MG tablet Take 81 mg by mouth daily      terbinafine (LAMISIL) 1 % cream Apply topically 2 times daily Apply topically 2 times daily.       Blood Glucose Monitoring Suppl (ONETOUCH VERIO IQ SYSTEM) W/DEVICE KIT       ONETOUCH VERIO strip       BD PEN NEEDLE RADU U/F 32G X 4 MM MISC       ONETOUCH DELICA LANCETS FINE MISC        Current Facility-Administered cancer    Screening for hyperlipidemia  -     Lipid, Fasting; Future  -     Lipid Panel; Future  -     Lipid Panel; Future    Need for prophylactic vaccination and inoculation against varicella  -     zoster recombinant adjuvanted vaccine [facility]) 50 MCG/0.5ML SUSR injection; Inject 0.5 mLs into the muscle once for 1 dose 50 MCG IM then repeat 2-6 months. Type 2 diabetes mellitus without complication, without long-term current use of insulin (HCC)  -     Hemoglobin A1C; Future  -     Comprehensive Metabolic Panel; Future  -     Hemoglobin A1C; Future  -     MICROALBUMIN / CREATININE URINE RATIO; Future    Osteoarthritis of right knee, unspecified osteoarthritis type  -     MS ARTHROCENTESIS ASPIR&/INJ MAJOR JT/BURSA W/O US    Screen for colon cancer  -     Cologuard; Future    Other orders  -     lisinopril (PRINIVIL;ZESTRIL) 40 MG tablet; Take 1 tablet by mouth daily  -     metFORMIN (GLUCOPHAGE-XR) 500 MG extended release tablet; Take 4 tablets by mouth daily (with breakfast)  -     PARoxetine (PAXIL) 30 MG tablet; Take 1 tablet by mouth every morning  -     CARTIA  MG extended release capsule; Take 2 capsules by mouth every evening  -     glimepiride (AMARYL) 4 MG tablet; Take 1 tablet by mouth 2 times daily (with meals)  -     hydrochlorothiazide (HYDRODIURIL) 25 MG tablet; Take 1 tablet by mouth every evening  -     insulin detemir (LEVEMIR) 100 UNIT/ML injection vial; Inject 10 Units into the skin nightly  -     levothyroxine (SYNTHROID) 100 MCG tablet; Take 1 tablet by mouth every evening  -     pioglitazone (ACTOS) 15 MG tablet;  Take 1 tablet by mouth every evening  -     methylPREDNISolone acetate (DEPO-MEDROL) injection 80 mg            PLAN:  Knee was injected as above  He is to continue same medication  drawlipid  and chemistry profile and A1c today  He is to follow-up in 6 months and get labs including microalbumin prior to that office visit  Follow-up in 2 weeks if his knee is no

## 2019-08-14 LAB
ESTIMATED AVERAGE GLUCOSE: 162.8 MG/DL
HBA1C MFR BLD: 7.3 %

## 2019-09-06 DIAGNOSIS — I48.91 ATRIAL FIBRILLATION AND FLUTTER (HCC): ICD-10-CM

## 2019-09-06 DIAGNOSIS — I48.92 ATRIAL FIBRILLATION AND FLUTTER (HCC): ICD-10-CM

## 2019-09-06 DIAGNOSIS — I48.0 PAF (PAROXYSMAL ATRIAL FIBRILLATION) (HCC): ICD-10-CM

## 2019-10-03 ENCOUNTER — TELEPHONE (OUTPATIENT)
Dept: CARDIOLOGY CLINIC | Age: 64
End: 2019-10-03

## 2019-10-03 DIAGNOSIS — I48.92 ATRIAL FIBRILLATION AND FLUTTER (HCC): ICD-10-CM

## 2019-10-03 DIAGNOSIS — I48.91 ATRIAL FIBRILLATION AND FLUTTER (HCC): ICD-10-CM

## 2019-10-03 DIAGNOSIS — I48.0 PAF (PAROXYSMAL ATRIAL FIBRILLATION) (HCC): ICD-10-CM

## 2019-10-28 ENCOUNTER — TELEPHONE (OUTPATIENT)
Dept: CARDIOLOGY CLINIC | Age: 64
End: 2019-10-28

## 2019-10-28 DIAGNOSIS — I48.91 ATRIAL FIBRILLATION AND FLUTTER (HCC): ICD-10-CM

## 2019-10-28 DIAGNOSIS — I48.92 ATRIAL FIBRILLATION AND FLUTTER (HCC): ICD-10-CM

## 2019-10-28 DIAGNOSIS — I48.0 PAF (PAROXYSMAL ATRIAL FIBRILLATION) (HCC): ICD-10-CM

## 2019-11-08 NOTE — TELEPHONE ENCOUNTER
Per Dr. Ale Blanca, patient having afib on Linq. Please schedule appointment to discuss need for anticoagulation. Message to [facility] to schedule appointment. Pt pleasant, childlike  Focused on belongings in his locker, asking to get money from locker  Malodorous and disheveled with layered clothing  No agitation/irritability

## 2019-11-18 ENCOUNTER — OFFICE VISIT (OUTPATIENT)
Dept: CARDIOLOGY CLINIC | Age: 64
End: 2019-11-18
Payer: MEDICARE

## 2019-11-18 VITALS
BODY MASS INDEX: 34.72 KG/M2 | DIASTOLIC BLOOD PRESSURE: 60 MMHG | HEIGHT: 71 IN | WEIGHT: 248 LBS | SYSTOLIC BLOOD PRESSURE: 122 MMHG | HEART RATE: 70 BPM

## 2019-11-18 DIAGNOSIS — R55 SYNCOPE AND COLLAPSE: ICD-10-CM

## 2019-11-18 DIAGNOSIS — I48.0 PAF (PAROXYSMAL ATRIAL FIBRILLATION) (HCC): Primary | ICD-10-CM

## 2019-11-18 DIAGNOSIS — Z98.890 HISTORY OF LOOP RECORDER: ICD-10-CM

## 2019-11-18 DIAGNOSIS — Z86.79 S/P ABLATION OF ATRIAL FIBRILLATION: ICD-10-CM

## 2019-11-18 DIAGNOSIS — I10 ESSENTIAL HYPERTENSION: ICD-10-CM

## 2019-11-18 DIAGNOSIS — E78.2 MIXED HYPERLIPIDEMIA: ICD-10-CM

## 2019-11-18 DIAGNOSIS — I65.23 BILATERAL CAROTID ARTERY STENOSIS: ICD-10-CM

## 2019-11-18 DIAGNOSIS — E11.9 TYPE 2 DIABETES MELLITUS WITHOUT COMPLICATION, WITHOUT LONG-TERM CURRENT USE OF INSULIN (HCC): ICD-10-CM

## 2019-11-18 DIAGNOSIS — Z98.890 S/P ABLATION OF ATRIAL FIBRILLATION: ICD-10-CM

## 2019-11-18 PROCEDURE — 99214 OFFICE O/P EST MOD 30 MIN: CPT | Performed by: NURSE PRACTITIONER

## 2019-11-22 RX ORDER — SIMVASTATIN 20 MG
TABLET ORAL
Qty: 90 TABLET | Refills: 1 | Status: SHIPPED
Start: 2019-11-22 | End: 2020-03-25 | Stop reason: SDUPTHER

## 2019-12-02 ENCOUNTER — TELEPHONE (OUTPATIENT)
Dept: CARDIOLOGY CLINIC | Age: 64
End: 2019-12-02

## 2019-12-02 DIAGNOSIS — I48.0 PAF (PAROXYSMAL ATRIAL FIBRILLATION) (HCC): ICD-10-CM

## 2019-12-23 ENCOUNTER — TELEPHONE (OUTPATIENT)
Dept: CARDIOLOGY CLINIC | Age: 64
End: 2019-12-23

## 2020-01-07 ENCOUNTER — TELEPHONE (OUTPATIENT)
Dept: CARDIOLOGY CLINIC | Age: 65
End: 2020-01-07

## 2020-01-22 ENCOUNTER — HOSPITAL ENCOUNTER (OUTPATIENT)
Dept: NEUROLOGY | Age: 65
Discharge: HOME | End: 2020-01-22
Payer: MEDICARE

## 2020-01-22 PROCEDURE — 95860 NEEDLE EMG 1 EXTREMITY: CPT

## 2020-01-24 ENCOUNTER — HOSPITAL ENCOUNTER (OUTPATIENT)
Dept: GENERAL RADIOLOGY | Age: 65
Discharge: HOME | End: 2020-01-24
Payer: MEDICARE

## 2020-01-24 ENCOUNTER — OFFICE VISIT (OUTPATIENT)
Dept: FAMILY MEDICINE CLINIC | Age: 65
End: 2020-01-24
Payer: MEDICARE

## 2020-01-24 ENCOUNTER — HOSPITAL ENCOUNTER (OUTPATIENT)
Age: 65
Discharge: HOME | End: 2020-01-24
Payer: MEDICARE

## 2020-01-24 VITALS
HEIGHT: 71 IN | BODY MASS INDEX: 37.04 KG/M2 | HEART RATE: 75 BPM | RESPIRATION RATE: 16 BRPM | SYSTOLIC BLOOD PRESSURE: 132 MMHG | TEMPERATURE: 98.2 F | WEIGHT: 264.6 LBS | DIASTOLIC BLOOD PRESSURE: 72 MMHG | OXYGEN SATURATION: 99 %

## 2020-01-24 PROCEDURE — 1036F TOBACCO NON-USER: CPT | Performed by: NURSE PRACTITIONER

## 2020-01-24 PROCEDURE — 96372 THER/PROPH/DIAG INJ SC/IM: CPT | Performed by: NURSE PRACTITIONER

## 2020-01-24 PROCEDURE — G8427 DOCREV CUR MEDS BY ELIG CLIN: HCPCS | Performed by: NURSE PRACTITIONER

## 2020-01-24 PROCEDURE — G8417 CALC BMI ABV UP PARAM F/U: HCPCS | Performed by: NURSE PRACTITIONER

## 2020-01-24 PROCEDURE — 3017F COLORECTAL CA SCREEN DOC REV: CPT | Performed by: NURSE PRACTITIONER

## 2020-01-24 PROCEDURE — 73562 X-RAY EXAM OF KNEE 3: CPT

## 2020-01-24 PROCEDURE — 99213 OFFICE O/P EST LOW 20 MIN: CPT | Performed by: NURSE PRACTITIONER

## 2020-01-24 PROCEDURE — G8484 FLU IMMUNIZE NO ADMIN: HCPCS | Performed by: NURSE PRACTITIONER

## 2020-01-24 PROCEDURE — 72100 X-RAY EXAM L-S SPINE 2/3 VWS: CPT

## 2020-01-24 PROCEDURE — 1123F ACP DISCUSS/DSCN MKR DOCD: CPT | Performed by: NURSE PRACTITIONER

## 2020-01-24 PROCEDURE — 4040F PNEUMOC VAC/ADMIN/RCVD: CPT | Performed by: NURSE PRACTITIONER

## 2020-01-24 RX ORDER — CELECOXIB 200 MG/1
200 CAPSULE ORAL DAILY
Qty: 30 CAPSULE | Refills: 0 | Status: SHIPPED | OUTPATIENT
Start: 2020-01-24 | End: 2020-02-05 | Stop reason: SDUPTHER

## 2020-01-24 RX ORDER — TIZANIDINE 2 MG/1
2 TABLET ORAL 3 TIMES DAILY PRN
Qty: 30 TABLET | Refills: 0 | Status: SHIPPED | OUTPATIENT
Start: 2020-01-24 | End: 2021-03-05

## 2020-01-24 RX ORDER — KETOROLAC TROMETHAMINE 30 MG/ML
30 INJECTION, SOLUTION INTRAMUSCULAR; INTRAVENOUS ONCE
Status: COMPLETED | OUTPATIENT
Start: 2020-01-24 | End: 2020-01-24

## 2020-01-24 RX ORDER — GABAPENTIN 300 MG/1
CAPSULE ORAL
COMMUNITY
Start: 2019-11-20 | End: 2020-10-23 | Stop reason: SDUPTHER

## 2020-01-24 RX ADMIN — KETOROLAC TROMETHAMINE 30 MG: 30 INJECTION, SOLUTION INTRAMUSCULAR; INTRAVENOUS at 11:07

## 2020-01-24 ASSESSMENT — PATIENT HEALTH QUESTIONNAIRE - PHQ9
SUM OF ALL RESPONSES TO PHQ QUESTIONS 1-9: 0
SUM OF ALL RESPONSES TO PHQ9 QUESTIONS 1 & 2: 0
1. LITTLE INTEREST OR PLEASURE IN DOING THINGS: 0
SUM OF ALL RESPONSES TO PHQ QUESTIONS 1-9: 0
2. FEELING DOWN, DEPRESSED OR HOPELESS: 0

## 2020-01-24 ASSESSMENT — ENCOUNTER SYMPTOMS
ABDOMINAL PAIN: 0
BACK PAIN: 1
BOWEL INCONTINENCE: 0

## 2020-01-24 NOTE — PATIENT INSTRUCTIONS
whichever feels better on your lower back). 3. Keep your lower back pressed to the floor. Hold for at least 15 to 30 seconds. 4. Relax, and lower the knee to the starting position. 5. Repeat with the other leg. Repeat 2 to 4 times with each leg. 6. To get more stretch, put your other leg flat on the floor while pulling your knee to your chest.    Curl-ups   1. Lie on the floor on your back with your knees bent at a 90-degree angle. Your feet should be flat on the floor, about 12 inches from your buttocks. 2. Cross your arms over your chest. If this bothers your neck, try putting your hands behind your neck (not your head), with your elbows spread apart. 3. Slowly tighten your belly muscles and raise your shoulder blades off the floor. 4. Keep your head in line with your body, and do not press your chin to your chest.  5. Hold this position for 1 or 2 seconds, then slowly lower yourself back down to the floor. 6. Repeat 8 to 12 times. Pelvic tilt exercise   1. Lie on your back with your knees bent. 2. \"Brace\" your stomach. This means to tighten your muscles by pulling in and imagining your belly button moving toward your spine. You should feel like your back is pressing to the floor and your hips and pelvis are rocking back. 3. Hold for about 6 seconds while you breathe smoothly. 4. Repeat 8 to 12 times. Heel dig bridging   1. Lie on your back with both knees bent and your ankles bent so that only your heels are digging into the floor. Your knees should be bent about 90 degrees. 2. Then push your heels into the floor, squeeze your buttocks, and lift your hips off the floor until your shoulders, hips, and knees are all in a straight line. 3. Hold for about 6 seconds as you continue to breathe normally, and then slowly lower your hips back down to the floor and rest for up to 10 seconds. 4. Do 8 to 12 repetitions. Hamstring stretch in doorway   1.  Lie on your back in a doorway, with one leg through the open door. 2. Slide your leg up the wall to straighten your knee. You should feel a gentle stretch down the back of your leg. 3. Hold the stretch for at least 15 to 30 seconds. Do not arch your back, point your toes, or bend either knee. Keep one heel touching the floor and the other heel touching the wall. 4. Repeat with your other leg. 5. Do 2 to 4 times for each leg. Hip flexor stretch   1. Kneel on the floor with one knee bent and one leg behind you. Place your forward knee over your foot. Keep your other knee touching the floor. 2. Slowly push your hips forward until you feel a stretch in the upper thigh of your rear leg. 3. Hold the stretch for at least 15 to 30 seconds. Repeat with your other leg. 4. Do 2 to 4 times on each side. Wall sit   1. Stand with your back 10 to 12 inches away from a wall. 2. Lean into the wall until your back is flat against it. 3. Slowly slide down until your knees are slightly bent, pressing your lower back into the wall. 4. Hold for about 6 seconds, then slide back up the wall. 5. Repeat 8 to 12 times. Follow-up care is a key part of your treatment and safety. Be sure to make and go to all appointments, and call your doctor if you are having problems. It's also a good idea to know your test results and keep a list of the medicines you take. Where can you learn more? Go to https://Etaphase.Project Talents. org and sign in to your Buyou account. Enter O130 in the [facility] box to learn more about \"Low Back Pain: Exercises. \"     If you do not have an account, please click on the \"Sign Up Now\" link. Current as of: June 26, 2019  Content Version: 12.3  © 2570-1150 Healthwise, Incorporated. Care instructions adapted under license by [facility] ([facility]).  If you have questions about a medical condition or this instruction, always ask your healthcare professional. [place] [address] any warranty or liability for your use of are usually other symptoms too. What are the symptoms? Your symptoms depend on your body and the cause of your back pain. You may feel:  · Pain that's sharp or dull. It may be in one small area or over a broad area. But even bad pain doesn't mean that it's caused by something serious. · Leg pain, numbness, or tingling. When a nerve gets squeezed--such as from a disc problem or arthritis--you may have symptoms in your leg or foot. You can even have leg symptoms from a back problem without having any pain in your back. How is low back pain diagnosed? A physical exam is the main way to diagnose low back pain. Your doctor may examine your back, check your nerves by testing your reflexes, and make sure that your muscles are strong. He or she also will ask questions about your back and overall health. Most people don't need any tests right away. Tests often don't show the reason for your pain. If your pain lasts more than 6 weeks or you have symptoms that your doctor is more concerned about, he or she may order tests. These may include an X-ray, a CT scan, or an MRI. In some cases, tests can help your doctor find a cause for your pain, especially for pain in one or both legs. How is low back pain treated? Most acute low back pain gets better on its own within a few weeks, no matter what the cause. Time and doing usual activities are all that most people need to feel better. Using heat or ice and taking over-the-counter pain medicine also can help while your body heals. If you aren't getting better on your own or your pain is very bad, your doctor may recommend:  · Physical therapy. · Spinal manipulation, such as by a chiropractor. · Acupuncture. · Massage. · Injections of steroid medicine in your back (especially for pain that involves your legs). If you have chronic low back pain, treatment will help you understand and manage your pain. Treatment may include:  · Staying active.  This may include walking or doing back exercises. · Physical therapy. · Medicines. Some of these medicines are also used for other problems, like depression. · Pain management. Your doctor may have you see a pain specialist.  · Counseling. Having chronic pain can be hard. It may help to talk to someone who can help you cope with your pain. Surgery isn't needed for most people. But it may help some types of low back pain. Follow-up care is a key part of your treatment and safety. Be sure to make and go to all appointments, and call your doctor if you are having problems. It's also a good idea to know your test results and keep a list of the medicines you take. When should you call for help? Call  911 anytime you think you may need emergency care. For example, call if:  · You can't move a leg at all. Call your doctor now or seek immediate medical care if:  · You have new or worse symptoms in your legs, belly, or buttocks. Symptoms may include:  ? Numbness or tingling. ? Weakness. ? Pain. · You lose bladder or bowel control. Watch closely for changes in your health, and be sure to contact your doctor if:  · Along with the back pain, you have a fever, lose weight, or don't feel well. · You do not get better as expected. Where can you learn more? Go to https://[employer] Markets.Moov [employer].. org and sign in to your [employer] (acquired by [employer] in 2014) account. Enter A007 in the [facility] box to learn more about \"Learning About Low Back Pain. \"     If you do not have an account, please click on the \"Sign Up Now\" link. Current as of: June 26, 2019  Content Version: 12.3  © 5693-6068 Healthwise, Incorporated. Care instructions adapted under license by [facility] ([facility]). If you have questions about a medical condition or this instruction, always ask your healthcare professional. [place] [address] any warranty or liability for your use of this information.          Patient Education        Knee Arthritis: Exercises  Introduction  Here are some examples of exercises for you to try. The exercises may be suggested for a condition or for rehabilitation. Start each exercise slowly. Ease off the exercises if you start to have pain. You will be told when to start these exercises and which ones will work best for you. How to do the exercises  Knee flexion with heel slide   1. Lie on your back with your knees bent. 2. Slide your heel back by bending your affected knee as far as you can. Then hook your other foot around your ankle to help pull your heel even farther back. 3. Hold for about 6 seconds, then rest for up to 10 seconds. 4. Repeat 8 to 12 times. 5. Switch legs and repeat steps 1 through 4, even if only one knee is sore. Quad sets   1. Sit with your affected leg straight and supported on the floor or a firm bed. Place a small, rolled-up towel under your knee. Your other leg should be bent, with that foot flat on the floor. 2. Tighten the thigh muscles of your affected leg by pressing the back of your knee down into the towel. 3. Hold for about 6 seconds, then rest for up to 10 seconds. 4. Repeat 8 to 12 times. 5. Switch legs and repeat steps 1 through 4, even if only one knee is sore. Straight-leg raises to the front   1. Lie on your back with your good knee bent so that your foot rests flat on the floor. Your affected leg should be straight. Make sure that your low back has a normal curve. You should be able to slip your hand in between the floor and the small of your back, with your palm touching the floor and your back touching the back of your hand. 2. Tighten the thigh muscles in your affected leg by pressing the back of your knee flat down to the floor. Hold your knee straight. 3. Keeping the thigh muscles tight and your leg straight, lift your affected leg up so that your heel is about 12 inches off the floor. Hold for about 6 seconds, then lower slowly. 4. Relax for up to 10 seconds between repetitions.   5. Repeat 8 to 12 times.  6. Switch legs and repeat steps 1 through 5, even if only one knee is sore. Active knee flexion   1. Lie on your stomach with your knees straight. If your kneecap is uncomfortable, roll up a washcloth and put it under your leg just above your kneecap. 2. Lift the foot of your affected leg by bending the knee so that you bring the foot up toward your buttock. If this motion hurts, try it without bending your knee quite as far. This may help you avoid any painful motion. 3. Slowly move your leg up and down. 4. Repeat 8 to 12 times. 5. Switch legs and repeat steps 1 through 4, even if only one knee is sore. Quadriceps stretch (facedown)   1. Lie flat on your stomach, and rest your face on the floor. 2. Wrap a towel or belt strap around the lower part of your affected leg. Then use the towel or belt strap to slowly pull your heel toward your buttock until you feel a stretch. 3. Hold for about 15 to 30 seconds, then relax your leg against the towel or belt strap. 4. Repeat 2 to 4 times. 5. Switch legs and repeat steps 1 through 4, even if only one knee is sore. Stationary exercise bike   1. If you do not have a stationary exercise bike at home, you can find one to ride at your local health club or community center. 2. Adjust the height of the bike seat so that your knee is slightly bent when your leg is extended downward. If your knee hurts when the pedal reaches the top, you can raise the seat so that your knee does not bend as much. 3. Start slowly. At first, try to do 5 to 10 minutes of cycling with little to no resistance. Then increase your time and the resistance bit by bit until you can do 20 to 30 minutes without pain. 4. If you start to have pain, rest your knee until your pain gets back to the level that is normal for you. Or cycle for less time or with less effort. Follow-up care is a key part of your treatment and safety.  Be sure to make and go to all appointments, and call your doctor if you are having problems. It's also a good idea to know your test results and keep a list of the medicines you take. Where can you learn more? Go to https://MoneyReefpenormanMoozey.TC Ice Cream. org and sign in to your [employer] account. Enter C159 in the [employer] box to learn more about \"Knee Arthritis: Exercises. \"     If you do not have an account, please click on the \"Sign Up Now\" link. Current as of: June 26, 2019  Content Version: 12.3  © 0232-7639 [employer]Landing. Care instructions adapted under license by [facility] ([facility]). If you have questions about a medical condition or this instruction, always ask your healthcare professional. [address] any warranty or liability for your use of this information. Patient Education        Knee Pain or Injury: Care Instructions  Your Care Instructions    Injuries are a common cause of knee problems. Sudden (acute) injuries may be caused by a direct blow to the knee. They can also be caused by abnormal twisting, bending, or falling on the knee. Pain, bruising, or swelling may be severe, and may start within minutes of the injury. Overuse is another cause of knee pain. Other causes are climbing stairs, kneeling, and other activities that use the knee. Everyday wear and tear, especially as you get older, also can cause knee pain. Rest, along with home treatment, often relieves pain and allows your knee to heal. If you have a serious knee injury, you may need tests and treatment. Follow-up care is a key part of your treatment and safety. Be sure to make and go to all appointments, and call your doctor if you are having problems. It's also a good idea to know your test results and keep a list of the medicines you take. How can you care for yourself at home? · Be safe with medicines. Read and follow all instructions on the label. ? If the doctor gave you a prescription medicine for pain, take it as prescribed.   ? If you leg (called a deep vein thrombosis), such as:  ? Pain in your calf, back of the knee, thigh, or groin. ? Redness and swelling in your leg or groin.    Watch closely for changes in your health, and be sure to contact your doctor if:    · You have tingling, weakness, or numbness in your knee.     · You have any new symptoms, such as swelling.     · You have bruises from a knee injury that last longer than 2 weeks.     · You do not get better as expected. Where can you learn more? Go to https://SignalpeCritical Pharmaceuticalseb.Capee group. org and sign in to your HandsFree Networks account. Enter K195 in the [employer] box to learn more about \"Knee Pain or Injury: Care Instructions. \"     If you do not have an account, please click on the \"Sign Up Now\" link. Current as of: June 26, 2019  Content Version: 12.3  © 6129-4841 Healthwise, Incorporated. Care instructions adapted under license by [facility] ([facility]). If you have questions about a medical condition or this instruction, always ask your healthcare professional. [place] [address] any warranty or liability for your use of this information.

## 2020-01-24 NOTE — PROGRESS NOTES
Chief Complaint   Patient presents with    Back Pain     fell 5 days ago       HPI:     Marshal Culp is a 75 y.o. male who presents today for complaints of Right knee pain and Lumbar Back Pain after a fall 5 days ago outside on the ice. He landed mainly on his right knee and he has ecchymosis and swelling lateral right knee, he also has lumbar back pain that radiates into his thighs. Patient is a diabetic, checks blood sugar daily as been running 150's. Back Pain   This is a new problem. The current episode started in the past 7 days. The problem occurs constantly. The problem has been waxing and waning since onset. The pain is present in the lumbar spine. The quality of the pain is described as aching, shooting, cramping and stabbing. The pain radiates to the right thigh and left thigh. The pain is at a severity of 8/10. The pain is moderate. The symptoms are aggravated by bending, lying down, sitting, position and twisting. Stiffness is present all day. Associated symptoms include leg pain. Pertinent negatives include no abdominal pain, bladder incontinence, bowel incontinence, chest pain, dysuria, fever, headaches, numbness, paresis, paresthesias, pelvic pain, perianal numbness, tingling, weakness or weight loss. Risk factors include recent trauma. He has tried heat and NSAIDs for the symptoms. The treatment provided mild relief. Knee Pain    The incident occurred 5 to 7 days ago. The incident occurred at home. The injury mechanism was a fall. The pain is present in the right knee. The quality of the pain is described as aching. The pain is at a severity of 6/10. The pain is moderate. The pain has been constant since onset. Associated symptoms include muscle weakness. Pertinent negatives include no inability to bear weight, loss of motion, loss of sensation, numbness or tingling. He reports no foreign bodies present. The symptoms are aggravated by movement and weight bearing.  He has tried NSAIDs and elevation (lidocaine patches) for the symptoms. The treatment provided no relief. Subjective:     Review of Systems   Constitutional: Negative for chills, fatigue, fever and weight loss. HENT: Negative. Respiratory: Negative for cough, chest tightness and shortness of breath. Cardiovascular: Negative for chest pain, palpitations and leg swelling. Gastrointestinal: Negative for abdominal distention, abdominal pain, bowel incontinence, constipation, diarrhea, nausea and vomiting. Genitourinary: Negative for bladder incontinence, dysuria and pelvic pain. Musculoskeletal: Positive for arthralgias, back pain, joint swelling and myalgias. Negative for neck pain and neck stiffness. Skin: Negative for rash and wound. Neurological: Negative for dizziness, tingling, weakness, light-headedness, numbness, headaches and paresthesias. Objective:     Vitals:    01/24/20 1007   BP: 132/72   Pulse: 75   Resp: 16   Temp: 98.2 °F (36.8 °C)   TempSrc: Oral   SpO2: 99%   Weight: 264 lb 9.6 oz (120 kg)   Height: 5' 11\" (1.803 m)       Physical Exam  Vitals signs and nursing note reviewed. HENT:      Head: Normocephalic. Neck:      Musculoskeletal: Normal range of motion and neck supple. Cardiovascular:      Rate and Rhythm: Normal rate and regular rhythm. Pulses: Normal pulses. Heart sounds: Normal heart sounds. Pulmonary:      Effort: Pulmonary effort is normal. No respiratory distress. Breath sounds: Normal breath sounds. Abdominal:      General: Bowel sounds are normal. There is no distension. Palpations: Abdomen is soft. Tenderness: There is no abdominal tenderness. Musculoskeletal:      Right knee: He exhibits swelling and ecchymosis. He exhibits normal range of motion, no effusion, no deformity, no laceration, no erythema, normal alignment and no LCL laxity.       Lumbar back: He exhibits decreased range of motion, tenderness, bony tenderness, swelling, edema, pain and spasm. He exhibits no deformity, no laceration and normal pulse. Skin:     General: Skin is warm and dry. Capillary Refill: Capillary refill takes less than 2 seconds. Findings: No rash. Neurological:      General: No focal deficit present. Mental Status: He is alert and oriented to person, place, and time. Psychiatric:         Mood and Affect: Mood normal.         Behavior: Behavior is cooperative. Assessment & Plan: The following diagnoses and conditions are stable with no further orders unlessindicated:    1. Lumbar radiculopathy    2. Acute pain of right knee        Jonny Zuniga was seen today for back pain. Diagnoses and all orders for this visit:    Lumbar radiculopathy  -     XR LUMBAR SPINE (2-3 VIEWS); Future  -     tiZANidine (ZANAFLEX) 2 MG tablet; Take 1 tablet by mouth 3 times daily as needed (muscle spasm)  -     ketorolac (TORADOL) injection 30 mg  -     celecoxib (CELEBREX) 200 MG capsule; Take 1 capsule by mouth daily start 1/25/20  For acute pain, rest, intermittent application of cold packs (later, may switch to heat, but do not sleep on heating pad), analgesics and muscle relaxants are recommended. Discussed longer term treatment plan of prn NSAID's and discussed a home back care exercise program with flexion exercise routine. Proper lifting with avoidance of heavy lifting discussed. Call or return to clinic prn if these symptoms worsen or fail to improve as anticipated. Acute pain of right knee  -     XR KNEE RIGHT (3 VIEWS); Future  -     tiZANidine (ZANAFLEX) 2 MG tablet; Take 1 tablet by mouth 3 times daily as needed (muscle spasm)  -     ketorolac (TORADOL) injection 30 mg  -     celecoxib (CELEBREX) 200 MG capsule; Take 1 capsule by mouth daily start 1/25/20    - Advised on PRICE. For acute pain, rest, intermittent application of cold packs (later, may switch to heat, but do not sleep on heating pad), and analgesics are recommended.  Advised to remain off the feet for the next few days as is practical.  Ice to the knee locally, and keep it elevated, including at bedtime. Slowly restart activities and walking over the next few days. For stairs, advised to take 'up with the good, and down with the bad' knee. Discussed longer term treatment plan of prn NSAID's and discussed a home exercise program. Consider Physical Therapy and ortho referral if symptoms fail to improve or get worse. Call or return to clinic prn if these symptoms worsen or fail to improve as anticipated. Return if symptoms worsen or fail to improve. Care discussed with patient. Questions answered. Patient verbalizes understanding and agrees with plan. Medications reviewed and reconciled. Continue current medications. Appropriate prescriptions are ordered. Risks and benefits of meds are discussed. After visit summary provided. Advised to call for any problems, questions, or concerns. Patient should call the office immediately with new or ongoing signs or symptoms or worsening, or proceed to the emergency room. If you are on medications which could impair your senses, you are at risk of weakness, falls, dizziness, or drowsiness. You should be careful during activities which could place you at risk of harm, such as climbing, using stairs,operating machinery, or driving vehicles. If you feel you cannot safely do these activities, you should request others to help you, or avoid the activities altogether. If you are drowsy for any other reason, you should usethe same precautions as listed above. Call if pattern of symptoms change or persists for an extended time.     Tahir Mason, FNP-C

## 2020-01-28 ASSESSMENT — ENCOUNTER SYMPTOMS
SHORTNESS OF BREATH: 0
CHEST TIGHTNESS: 0
NAUSEA: 0
CONSTIPATION: 0
VOMITING: 0
ABDOMINAL DISTENTION: 0
COUGH: 0
DIARRHEA: 0

## 2020-01-30 ENCOUNTER — TELEPHONE (OUTPATIENT)
Dept: FAMILY MEDICINE CLINIC | Age: 65
End: 2020-01-30

## 2020-01-30 RX ORDER — DIAZEPAM 10 MG/1
10 TABLET ORAL EVERY 8 HOURS PRN
Qty: 1 TABLET | Refills: 0 | Status: SHIPPED | OUTPATIENT
Start: 2020-01-30 | End: 2020-02-09

## 2020-01-30 NOTE — TELEPHONE ENCOUNTER
HAVING MRI TOMORROW 5:00. WOULD LIKE SOMETHING TO COME HIM DOWN. FREAKING OUT ALREADY. Kalen Chase    1 VALIUM ISN'T ENOUGH CAN HE TAKE 2 AND WILL YOU CALL IN ANOTHER.  HE DOES HAVE A RIDE

## 2020-01-30 NOTE — TELEPHONE ENCOUNTER
I sent a prescription for 1 Valium tablet 10 mg he can pick this up at [address] [place] [address] Drive under take 1 hour prior to the test and make sure he has someone driving

## 2020-01-31 ENCOUNTER — HOSPITAL ENCOUNTER (OUTPATIENT)
Dept: MRI IMAGING | Age: 65
Discharge: HOME | End: 2020-01-31
Payer: MEDICARE

## 2020-01-31 PROCEDURE — 72148 MRI LUMBAR SPINE W/O DYE: CPT

## 2020-02-03 ENCOUNTER — TELEPHONE (OUTPATIENT)
Dept: CARDIOLOGY CLINIC | Age: 65
End: 2020-02-03

## 2020-02-05 RX ORDER — CELECOXIB 200 MG/1
200 CAPSULE ORAL DAILY
Qty: 30 CAPSULE | Refills: 0 | Status: SHIPPED | OUTPATIENT
Start: 2020-02-05 | End: 2020-03-26 | Stop reason: SDUPTHER

## 2020-02-19 ENCOUNTER — TELEPHONE (OUTPATIENT)
Dept: CARDIOLOGY CLINIC | Age: 65
End: 2020-02-19

## 2020-02-20 ENCOUNTER — PROCEDURE VISIT (OUTPATIENT)
Dept: CARDIOLOGY CLINIC | Age: 65
End: 2020-02-20
Payer: MEDICARE

## 2020-02-20 LAB
LV EF: 52 %
LVEF MODALITY: NORMAL

## 2020-02-20 PROCEDURE — 78452 HT MUSCLE IMAGE SPECT MULT: CPT | Performed by: INTERNAL MEDICINE

## 2020-02-20 PROCEDURE — 93015 CV STRESS TEST SUPVJ I&R: CPT | Performed by: INTERNAL MEDICINE

## 2020-02-20 PROCEDURE — A9500 TC99M SESTAMIBI: HCPCS | Performed by: INTERNAL MEDICINE

## 2020-02-21 ENCOUNTER — TELEPHONE (OUTPATIENT)
Dept: CARDIOLOGY CLINIC | Age: 65
End: 2020-02-21

## 2020-02-21 NOTE — TELEPHONE ENCOUNTER
LM with stress test results on VM           Normal perfusion study with normal distribution in all coronal, short, and horizontal axis. The observed defect is consistent with diaphragmatic attenuation. Normal LV function. LVEF is 52 %.

## 2020-02-24 NOTE — PROCEDURES
[address] [place] [address], [address] [place] [address]                             ELECTROMYOGRAM REPORT    PATIENT NAME: Otto Choi                     :        1955  MED REC NO:   [id number]                          ROOM:  ACCOUNT NO:   [de-identified]                           ADMIT DATE: 2020  PROVIDER:     Scot Bull MD    DATE OF EM2020    REFERRING PROVIDER:  Scot Bull MD    CLINICAL PROBLEMS:  1. Dysesthesia of both hands and arms. 2.  Weakness of the left arm. IMPRESSION:  1. Moderately severe bilateral carpal tunnel syndrome with prolongation  of left as well as right median nerve motor distal latencies; both  median sensory distal latencies were not obtainable. Mild-to-moderate  neurogenic EMG changes are seen in both thenar muscles. 2.  Mild increase in neurogenic activity is seen in the left extensor  digitorum communis and brachioradialis muscles. This finding is  consistent with possibly old radial nerve injury. 3.  Nerve conduction velocities are within normal range. 4.  Right as well as left radial nerve sensory distal latencies were not  obtainable. This may be suggestive of mild sensory neuropathy.         Yordy Copeland MD    D: 2020 12:23:38       T: 2020 22:50:28     [id number]  Job#: [id number]     Doc#: [id number]    CC:

## 2020-02-26 NOTE — TELEPHONE ENCOUNTER
Revised Cardiac Risk Index:   High risk type of surgery no   H/O ischemic heart disease  (h/o MI, or a positive stress test, current complaint of chest pain considered to be secondary to myocardial ischemia,  Use of nitrate therapy or ECG with pathological Q waves; do not count prior coronary revascularization procedure unless one of the other criteria for ischemic heart disease is present) no     H/O heart failure no  H/O CVA no   H/O DM treated with insulin no  Preoperative serum creatinine >2.0 mg/dl (177 micromol/L) no     The calculated rate of cardiac death, nonfatal myocardial infarction, and nonfatal cardiac arrest according to the number of predictors is; No risk factors  0.4% (95% CI: 0.1-0.8)     he is considered a moderate risk for surgery due to PAF, carotid artery disease and diabetic. Anticoagulation can be held prior to surgery at the discretion of the surgeon. It should be resumed as soon as possible if held. Antiplatelet therapy can be held prior to surgery at the discretion of the surgeon. To be resumed as soon as possible if held.

## 2020-02-27 ENCOUNTER — TELEPHONE (OUTPATIENT)
Dept: CARDIOLOGY CLINIC | Age: 65
End: 2020-02-27

## 2020-02-27 RX ORDER — PAROXETINE 30 MG/1
TABLET, FILM COATED ORAL
Qty: 90 TABLET | Refills: 0 | Status: SHIPPED | OUTPATIENT
Start: 2020-02-27 | End: 2020-06-10

## 2020-03-06 NOTE — TELEPHONE ENCOUNTER
TO DR. Christoph Phipps-    PATIENT HAD THE VIALS CALLED IN TO PHARMACY---HE NEEDS THE PRE-FILLED PENS CALLED----PLEASE ----HE IS OUT.

## 2020-03-18 ENCOUNTER — TELEPHONE (OUTPATIENT)
Dept: FAMILY MEDICINE CLINIC | Age: 65
End: 2020-03-18

## 2020-03-19 NOTE — TELEPHONE ENCOUNTER
This is a potentially serious infection in diabetics  He has a boil or abscess he should be seen before this gets too bad  Zithromax does not work for this  He should come here or urgent care to be seen about this

## 2020-03-20 ENCOUNTER — OFFICE VISIT (OUTPATIENT)
Dept: FAMILY MEDICINE CLINIC | Age: 65
End: 2020-03-20
Payer: MEDICARE

## 2020-03-20 VITALS
BODY MASS INDEX: 33.12 KG/M2 | OXYGEN SATURATION: 98 % | SYSTOLIC BLOOD PRESSURE: 130 MMHG | WEIGHT: 236.6 LBS | DIASTOLIC BLOOD PRESSURE: 68 MMHG | HEART RATE: 88 BPM | HEIGHT: 71 IN

## 2020-03-20 PROCEDURE — 3046F HEMOGLOBIN A1C LEVEL >9.0%: CPT | Performed by: FAMILY MEDICINE

## 2020-03-20 PROCEDURE — 99213 OFFICE O/P EST LOW 20 MIN: CPT | Performed by: FAMILY MEDICINE

## 2020-03-20 PROCEDURE — G8417 CALC BMI ABV UP PARAM F/U: HCPCS | Performed by: FAMILY MEDICINE

## 2020-03-20 PROCEDURE — 1123F ACP DISCUSS/DSCN MKR DOCD: CPT | Performed by: FAMILY MEDICINE

## 2020-03-20 PROCEDURE — 4040F PNEUMOC VAC/ADMIN/RCVD: CPT | Performed by: FAMILY MEDICINE

## 2020-03-20 PROCEDURE — G8427 DOCREV CUR MEDS BY ELIG CLIN: HCPCS | Performed by: FAMILY MEDICINE

## 2020-03-20 PROCEDURE — 1036F TOBACCO NON-USER: CPT | Performed by: FAMILY MEDICINE

## 2020-03-20 PROCEDURE — 3017F COLORECTAL CA SCREEN DOC REV: CPT | Performed by: FAMILY MEDICINE

## 2020-03-20 PROCEDURE — 2022F DILAT RTA XM EVC RTNOPTHY: CPT | Performed by: FAMILY MEDICINE

## 2020-03-20 PROCEDURE — G8484 FLU IMMUNIZE NO ADMIN: HCPCS | Performed by: FAMILY MEDICINE

## 2020-03-20 RX ORDER — SULFAMETHOXAZOLE AND TRIMETHOPRIM 800; 160 MG/1; MG/1
1 TABLET ORAL 2 TIMES DAILY
Qty: 20 TABLET | Refills: 0 | Status: SHIPPED | OUTPATIENT
Start: 2020-03-20 | End: 2020-03-30

## 2020-03-20 NOTE — PROGRESS NOTES
3/20/2020     Lluvia Dunne      Chief Complaint   Patient presents with    Wound Infection     POSS. INFECTION ON SCROTUM,  PAIN AND HAS HAD SOME SEAPAGE       HPI      Carly Herrera is a 77 y.o. male who presents today with the followin. Abscess of scrotum    2. Type 2 diabetes mellitus without complication, without long-term current use of insulin (HCC)    Patient called the office and said he had a boil or sore on his scrotum he want to be treated over the phone I told him he had to be seen today  He admitted that he opened what sound like notes area of folliculitis or a boil with a needle and now is pretty well resolved  He did not have any fever with that he has had the skin boils before he is a diabetic    REVIEW OF SYMPTOMS    Review of Systems    PAST MEDICAL HISTORY  Past Medical History:   Diagnosis Date    Arrhythmia     Atrial fibrillation ([facility] [address]     Carotid Doppler 2018    Mild (0-49%) disease of the Bilateral Internal carotid artery. No hemodynamically significant stenosis noted. Normal vertebral flow. 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 No reversible myocardial ischemia seen. Uneventful pharmacological     History of echocardiogram 2017    Ejection fraction is visually estimated at 55%. mildly Increased LVOT velocity. Aortic valve leaflets are somewhat thickened. Aortic valve appears tricuspid. Trivial aortic regurgitation is noted. Mitral annular calcification is present. Mild calcification of the anterior leaflet of the mitral valve.  Hyperlipidemia     Hypertension        FAMILY HISTORY  Family History   Problem Relation Age of Onset    Heart Disease Father        SOCIAL HISTORY  Social History     Socioeconomic History    Marital status:       Spouse name: Not on file    Number of children: Not on file    Years of education: Not on file    Highest education level: Not on file Occupational History    Not on file   Social Needs    Financial resource strain: Not on file    Food insecurity     Worry: Not on file     Inability: Not on file    Transportation needs     Medical: Not on file     Non-medical: Not on file   Tobacco Use    Smoking status: Never Smoker    Smokeless tobacco: Never Used   Substance and Sexual Activity    Alcohol use:  Yes    Drug use: No    Sexual activity: Not on file   Lifestyle    Physical activity     Days per week: Not on file     Minutes per session: Not on file    Stress: Not on file   Relationships    Social connections     Talks on phone: Not on file     Gets together: Not on file     Attends [employer] service: Not on file     Active member of club or organization: Not on file     Attends meetings of clubs or organizations: Not on file     Relationship status: Not on file    Intimate partner violence     Fear of current or ex partner: Not on file     Emotionally abused: Not on file     Physically abused: Not on file     Forced sexual activity: Not on file   Other Topics Concern    Not on file   Social History Narrative    Not on file        SURGICAL HISTORY  Past Surgical History:   Procedure Laterality Date    OTHER SURGICAL HISTORY  08/22/2018    Atrial Fib cardiac Ablation       CURRENT MEDICATIONS  Current Outpatient Medications   Medication Sig Dispense Refill    sulfamethoxazole-trimethoprim (BACTRIM DS) 800-160 MG per tablet Take 1 tablet by mouth 2 times daily for 10 days 20 tablet 0    insulin detemir (LEVEMIR FLEXTOUCH) 100 UNIT/ML injection pen Inject 10 Units into the skin nightly 5 pen 3    insulin detemir (LEVEMIR) 100 UNIT/ML injection vial Inject 10 Units into the skin nightly 3 vial 1    PARoxetine (PAXIL) 30 MG tablet TAKE ONE TABLET BY MOUTH EVERY MORNING 90 tablet 0    rivaroxaban (XARELTO) 20 MG TABS tablet Take 1 tablet by mouth daily (with breakfast) for 28 days 28 tablet 0    celecoxib (CELEBREX) 200 MG capsule

## 2020-03-25 ENCOUNTER — TELEPHONE (OUTPATIENT)
Dept: CARDIOLOGY CLINIC | Age: 65
End: 2020-03-25

## 2020-03-26 RX ORDER — CELECOXIB 200 MG/1
CAPSULE ORAL
Qty: 30 CAPSULE | Refills: 0 | OUTPATIENT
Start: 2020-03-26

## 2020-03-26 RX ORDER — CELECOXIB 200 MG/1
200 CAPSULE ORAL DAILY
Qty: 30 CAPSULE | Refills: 0 | Status: SHIPPED | OUTPATIENT
Start: 2020-03-26 | End: 2020-04-22

## 2020-03-31 ENCOUNTER — TELEPHONE (OUTPATIENT)
Dept: FAMILY MEDICINE CLINIC | Age: 65
End: 2020-03-31

## 2020-03-31 RX ORDER — DOXYCYCLINE HYCLATE 100 MG
100 TABLET ORAL 2 TIMES DAILY
Qty: 20 TABLET | Refills: 0 | Status: SHIPPED | OUTPATIENT
Start: 2020-03-31 | End: 2020-04-10

## 2020-04-10 RX ORDER — DOXYCYCLINE HYCLATE 100 MG
100 TABLET ORAL 2 TIMES DAILY
Qty: 20 TABLET | Refills: 0 | OUTPATIENT
Start: 2020-04-10 | End: 2020-04-20

## 2020-04-13 ENCOUNTER — TELEPHONE (OUTPATIENT)
Dept: FAMILY MEDICINE CLINIC | Age: 65
End: 2020-04-13

## 2020-04-13 RX ORDER — GLIMEPIRIDE 4 MG/1
TABLET ORAL
Qty: 180 TABLET | Refills: 0 | Status: SHIPPED | OUTPATIENT
Start: 2020-04-13 | End: 2020-07-08

## 2020-04-13 RX ORDER — HYDROCHLOROTHIAZIDE 25 MG/1
TABLET ORAL
Qty: 90 TABLET | Refills: 0 | Status: SHIPPED | OUTPATIENT
Start: 2020-04-13 | End: 2020-07-08

## 2020-04-22 RX ORDER — PIOGLITAZONEHYDROCHLORIDE 15 MG/1
TABLET ORAL
Qty: 30 TABLET | Refills: 0 | Status: SHIPPED | OUTPATIENT
Start: 2020-04-22 | End: 2020-05-22

## 2020-04-22 RX ORDER — DILTIAZEM HYDROCHLORIDE 180 MG/1
CAPSULE, EXTENDED RELEASE ORAL
Qty: 60 CAPSULE | Refills: 0 | Status: SHIPPED | OUTPATIENT
Start: 2020-04-22 | End: 2020-05-22

## 2020-05-01 RX ORDER — LEVOTHYROXINE SODIUM 0.1 MG/1
TABLET ORAL
Qty: 90 TABLET | Refills: 0 | Status: SHIPPED | OUTPATIENT
Start: 2020-05-01 | End: 2020-07-31

## 2020-05-01 RX ORDER — METFORMIN HYDROCHLORIDE 500 MG/1
TABLET, EXTENDED RELEASE ORAL
Qty: 360 TABLET | Refills: 0 | Status: SHIPPED | OUTPATIENT
Start: 2020-05-01 | End: 2020-07-31

## 2020-05-01 RX ORDER — LISINOPRIL 40 MG/1
TABLET ORAL
Qty: 90 TABLET | Refills: 0 | Status: SHIPPED | OUTPATIENT
Start: 2020-05-01 | End: 2020-07-31

## 2020-05-22 RX ORDER — PIOGLITAZONEHYDROCHLORIDE 15 MG/1
TABLET ORAL
Qty: 30 TABLET | Refills: 0 | Status: SHIPPED | OUTPATIENT
Start: 2020-05-22 | End: 2020-10-06

## 2020-05-22 RX ORDER — DILTIAZEM HYDROCHLORIDE 180 MG/1
CAPSULE, EXTENDED RELEASE ORAL
Qty: 60 CAPSULE | Refills: 0 | Status: SHIPPED | OUTPATIENT
Start: 2020-05-22 | End: 2020-06-22

## 2020-05-26 RX ORDER — PAROXETINE 30 MG/1
TABLET, FILM COATED ORAL
Qty: 90 TABLET | Refills: 0 | OUTPATIENT
Start: 2020-05-26

## 2020-06-10 RX ORDER — PAROXETINE 30 MG/1
TABLET, FILM COATED ORAL
Qty: 30 TABLET | Refills: 0 | Status: SHIPPED | OUTPATIENT
Start: 2020-06-10 | End: 2020-07-07

## 2020-06-18 ENCOUNTER — VIRTUAL VISIT (OUTPATIENT)
Dept: FAMILY MEDICINE CLINIC | Age: 65
End: 2020-06-18
Payer: MEDICARE

## 2020-06-18 PROBLEM — Z92.89 HISTORY OF ECHOCARDIOGRAM: Status: RESOLVED | Noted: 2017-01-30 | Resolved: 2020-06-18

## 2020-06-18 PROBLEM — R93.89 ABNORMAL MAGNETIC RESONANCE IMAGING STUDY: Status: RESOLVED | Noted: 2017-03-20 | Resolved: 2020-06-18

## 2020-06-18 PROBLEM — Z92.89 HISTORY OF CARDIOVASCULAR STRESS TEST: Status: RESOLVED | Noted: 2017-01-31 | Resolved: 2020-06-18

## 2020-06-18 PROBLEM — R42 DIZZINESS: Status: RESOLVED | Noted: 2017-03-20 | Resolved: 2020-06-18

## 2020-06-18 PROBLEM — F33.9 EPISODE OF RECURRENT MAJOR DEPRESSIVE DISORDER (HCC): Chronic | Status: ACTIVE | Noted: 2020-06-18

## 2020-06-18 PROBLEM — Z98.890 HISTORY OF BILIARY T-TUBE PLACEMENT: Status: RESOLVED | Noted: 2017-03-20 | Resolved: 2020-06-18

## 2020-06-18 PROBLEM — Z92.89 HISTORY OF DOPPLER ULTRASOUND: Status: RESOLVED | Noted: 2018-03-21 | Resolved: 2020-06-18

## 2020-06-18 PROBLEM — S02.92XA: Status: RESOLVED | Noted: 2017-01-29 | Resolved: 2020-06-18

## 2020-06-18 PROBLEM — F07.81 BRAIN SYNDROME, POSTTRAUMATIC: Status: RESOLVED | Noted: 2017-03-20 | Resolved: 2020-06-18

## 2020-06-18 PROBLEM — G56.30 RADIAL NERVE COMPRESSION: Status: RESOLVED | Noted: 2017-01-29 | Resolved: 2020-06-18

## 2020-06-18 PROBLEM — R55 SYNCOPE: Status: RESOLVED | Noted: 2017-01-29 | Resolved: 2020-06-18

## 2020-06-18 PROBLEM — R42 FEELING FAINT: Status: RESOLVED | Noted: 2017-03-20 | Resolved: 2020-06-18

## 2020-06-18 PROBLEM — S14.2XXA: Status: RESOLVED | Noted: 2017-03-20 | Resolved: 2020-06-18

## 2020-06-18 PROBLEM — S06.33AA CEREBRAL CONTUSION (HCC): Status: RESOLVED | Noted: 2017-03-20 | Resolved: 2020-06-18

## 2020-06-18 PROBLEM — D64.9 ANEMIA: Status: RESOLVED | Noted: 2017-03-20 | Resolved: 2020-06-18

## 2020-06-18 PROCEDURE — 3046F HEMOGLOBIN A1C LEVEL >9.0%: CPT | Performed by: FAMILY MEDICINE

## 2020-06-18 PROCEDURE — 1123F ACP DISCUSS/DSCN MKR DOCD: CPT | Performed by: FAMILY MEDICINE

## 2020-06-18 PROCEDURE — 3017F COLORECTAL CA SCREEN DOC REV: CPT | Performed by: FAMILY MEDICINE

## 2020-06-18 PROCEDURE — 4040F PNEUMOC VAC/ADMIN/RCVD: CPT | Performed by: FAMILY MEDICINE

## 2020-06-18 PROCEDURE — G8427 DOCREV CUR MEDS BY ELIG CLIN: HCPCS | Performed by: FAMILY MEDICINE

## 2020-06-18 PROCEDURE — 99442 PR PHYS/QHP TELEPHONE EVALUATION 11-20 MIN: CPT | Performed by: FAMILY MEDICINE

## 2020-06-18 PROCEDURE — 2022F DILAT RTA XM EVC RTNOPTHY: CPT | Performed by: FAMILY MEDICINE

## 2020-06-18 RX ORDER — ATORVASTATIN CALCIUM 40 MG/1
40 TABLET, FILM COATED ORAL DAILY
Qty: 90 TABLET | Refills: 1 | Status: SHIPPED | OUTPATIENT
Start: 2020-06-18 | End: 2020-12-04

## 2020-06-18 NOTE — PROGRESS NOTES
medications  He is living alone  He is fairly active physically  He is not yet had the zoster vaccine but this was recommended  Other immunizations are up-to-date  The pharmacist said something drug interactions with his medicines only when I see his simvastatin and a calcium channel blocker  For that I am going to switch him to atorvastatin 40 mg daily and leave the calcium channel blocker as it is  A- patient needs urologic referral to evaluate his chronic testicular pain  He is to continue same medicines for cardiology  He is to come in and get a lipid and chemistry profile  All return contact Jose for his colon cancer screening  Follow-up in 6 months  Get labs prior to that office visit      I affirm this is a Patient Initiated Episode with a Patient who has not had a related appointment within my department in the past 7 days or scheduled within the next 24 hours.     Patient identification was verified at the start of the visit: Yes    Total Time: minutes: 11-20 minutes    Note: not billable if this call serves to triage the patient into an appointment for the relevant concern      Davon Cheney

## 2020-06-25 ENCOUNTER — OFFICE VISIT (OUTPATIENT)
Dept: CARDIOLOGY CLINIC | Age: 65
End: 2020-06-25
Payer: MEDICARE

## 2020-06-25 VITALS
HEIGHT: 71 IN | WEIGHT: 239.6 LBS | BODY MASS INDEX: 33.54 KG/M2 | HEART RATE: 80 BPM | SYSTOLIC BLOOD PRESSURE: 152 MMHG | DIASTOLIC BLOOD PRESSURE: 82 MMHG

## 2020-06-25 PROCEDURE — 93000 ELECTROCARDIOGRAM COMPLETE: CPT | Performed by: NURSE PRACTITIONER

## 2020-06-25 PROCEDURE — G8417 CALC BMI ABV UP PARAM F/U: HCPCS | Performed by: NURSE PRACTITIONER

## 2020-06-25 PROCEDURE — 99214 OFFICE O/P EST MOD 30 MIN: CPT | Performed by: NURSE PRACTITIONER

## 2020-06-25 PROCEDURE — 3017F COLORECTAL CA SCREEN DOC REV: CPT | Performed by: NURSE PRACTITIONER

## 2020-06-25 PROCEDURE — 4040F PNEUMOC VAC/ADMIN/RCVD: CPT | Performed by: NURSE PRACTITIONER

## 2020-06-25 PROCEDURE — 1036F TOBACCO NON-USER: CPT | Performed by: NURSE PRACTITIONER

## 2020-06-25 PROCEDURE — 1123F ACP DISCUSS/DSCN MKR DOCD: CPT | Performed by: NURSE PRACTITIONER

## 2020-06-25 PROCEDURE — G8428 CUR MEDS NOT DOCUMENT: HCPCS | Performed by: NURSE PRACTITIONER

## 2020-06-25 RX ORDER — DILTIAZEM HYDROCHLORIDE 180 MG/1
360 CAPSULE, EXTENDED RELEASE ORAL DAILY
Qty: 180 CAPSULE | Refills: 1 | Status: SHIPPED
Start: 2020-06-25 | End: 2020-08-11 | Stop reason: SDUPTHER

## 2020-06-25 ASSESSMENT — ENCOUNTER SYMPTOMS
DIARRHEA: 0
SHORTNESS OF BREATH: 0
SINUS PRESSURE: 0
SINUS PAIN: 0
CHEST TIGHTNESS: 0
NAUSEA: 0
SORE THROAT: 0
CONSTIPATION: 0
BACK PAIN: 0
ABDOMINAL PAIN: 0
COUGH: 0
EYE ITCHING: 0
ABDOMINAL DISTENTION: 0
VOMITING: 0
COLOR CHANGE: 0
EYE REDNESS: 0
BLOOD IN STOOL: 0

## 2020-06-25 NOTE — PROGRESS NOTES
BY MOUTH TWICE A DAY WITH MEALS 180 tablet 0    insulin detemir (LEVEMIR FLEXTOUCH) 100 UNIT/ML injection pen Inject 10 Units into the skin nightly 5 pen 3    insulin detemir (LEVEMIR) 100 UNIT/ML injection vial Inject 10 Units into the skin nightly 3 vial 1    gabapentin (NEURONTIN) 300 MG capsule       tiZANidine (ZANAFLEX) 2 MG tablet Take 1 tablet by mouth 3 times daily as needed (muscle spasm) 30 tablet 0    aspirin 81 MG tablet Take 81 mg by mouth daily      terbinafine (LAMISIL) 1 % cream Apply topically 2 times daily Apply topically 2 times daily.  Blood Glucose Monitoring Suppl ([employer] IQ SYSTEM) W/DEVICE KIT       ONETOUCH VERIO strip       BD PEN NEEDLE RADU U/F 32G X 4 MM MISC       ONETOUCH DELICA LANCETS FINE MISC        No current facility-administered medications for this visit. Allergies: Morphine  Past Medical History:   Diagnosis Date    Arrhythmia     Atrial fibrillation ([address]     Carotid Doppler 03/21/2018    Mild (0-49%) disease of the Bilateral Internal carotid artery. No hemodynamically significant stenosis noted. Normal vertebral flow. 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 No reversible myocardial ischemia seen. Uneventful pharmacological     History of echocardiogram 01/30/2017    Ejection fraction is visually estimated at 55%. mildly Increased LVOT velocity. Aortic valve leaflets are somewhat thickened. Aortic valve appears tricuspid. Trivial aortic regurgitation is noted. Mitral annular calcification is present. Mild calcification of the anterior leaflet of the mitral valve.     Hyperlipidemia     Hypertension      Past Surgical History:   Procedure Laterality Date    OTHER SURGICAL HISTORY  08/22/2018    Atrial Fib cardiac Ablation     Family History   Problem Relation Age of Onset    Heart Disease Father      Social History     Tobacco Use    Smoking status:

## 2020-07-07 RX ORDER — PAROXETINE 30 MG/1
TABLET, FILM COATED ORAL
Qty: 30 TABLET | Refills: 5 | Status: SHIPPED | OUTPATIENT
Start: 2020-07-07 | End: 2021-01-11 | Stop reason: SDUPTHER

## 2020-07-08 RX ORDER — HYDROCHLOROTHIAZIDE 25 MG/1
TABLET ORAL
Qty: 90 TABLET | Refills: 1 | Status: SHIPPED | OUTPATIENT
Start: 2020-07-08 | End: 2021-02-15 | Stop reason: SDUPTHER

## 2020-07-08 RX ORDER — GLIMEPIRIDE 4 MG/1
TABLET ORAL
Qty: 180 TABLET | Refills: 1 | Status: SHIPPED | OUTPATIENT
Start: 2020-07-08 | End: 2021-02-15 | Stop reason: SDUPTHER

## 2020-07-20 ENCOUNTER — HOSPITAL ENCOUNTER (OUTPATIENT)
Dept: ULTRASOUND IMAGING | Age: 65
Discharge: HOME | End: 2020-07-20
Payer: MEDICARE

## 2020-07-20 PROCEDURE — 93975 VASCULAR STUDY: CPT

## 2020-07-20 PROCEDURE — 76870 US EXAM SCROTUM: CPT

## 2020-07-31 RX ORDER — METFORMIN HYDROCHLORIDE 500 MG/1
TABLET, EXTENDED RELEASE ORAL
Qty: 360 TABLET | Refills: 0 | Status: SHIPPED | OUTPATIENT
Start: 2020-07-31 | End: 2020-10-28

## 2020-07-31 RX ORDER — LEVOTHYROXINE SODIUM 0.1 MG/1
TABLET ORAL
Qty: 90 TABLET | Refills: 0 | Status: SHIPPED | OUTPATIENT
Start: 2020-07-31 | End: 2020-10-28

## 2020-07-31 RX ORDER — LISINOPRIL 40 MG/1
TABLET ORAL
Qty: 90 TABLET | Refills: 0 | Status: SHIPPED | OUTPATIENT
Start: 2020-07-31 | End: 2020-10-28

## 2020-07-31 NOTE — TELEPHONE ENCOUNTER
Requested Prescriptions     Signed Prescriptions Disp Refills    lisinopril (PRINIVIL;ZESTRIL) 40 MG tablet 90 tablet 0     Sig: TAKE ONE TABLET BY MOUTH DAILY     Authorizing Provider: Adama Tristan     Ordering User: CARSON Rahman    metFORMIN (GLUCOPHAGE-XR) 500 MG extended release tablet 360 tablet 0     Sig: TAKE FOUR TABLETS BY MOUTH DAILY WITH BREAKFAST     Authorizing Provider: Adama Tristan     Ordering User: Maribell Cullen levothyroxine (SYNTHROID) 100 MCG tablet 90 tablet 0     Sig: TAKE ONE TABLET BY MOUTH EVERY EVENING     Authorizing Provider: Adama Tristan     Ordering User: Mai Ariza

## 2020-08-04 ENCOUNTER — PROCEDURE VISIT (OUTPATIENT)
Dept: CARDIOLOGY CLINIC | Age: 65
End: 2020-08-04
Payer: MEDICARE

## 2020-08-04 LAB
LV EF: 53 %
LVEF MODALITY: NORMAL

## 2020-08-04 PROCEDURE — 93306 TTE W/DOPPLER COMPLETE: CPT | Performed by: INTERNAL MEDICINE

## 2020-08-10 ENCOUNTER — TELEPHONE (OUTPATIENT)
Dept: CARDIOLOGY CLINIC | Age: 65
End: 2020-08-10

## 2020-08-10 NOTE — TELEPHONE ENCOUNTER
Notified of results. Summary   Left ventricular function is low normal, EF is estimated at 50-55%. Grade I diastolic dysfunction. Sclerotic aortic valve with mild stenosis, mean gradient is 21mmHg. Mitral annular calcification and calcification of the anterior leaflet of   mitral valve is present. No significant valvular regurgitation noted. No evidence of pericardial effusion.

## 2020-08-11 ENCOUNTER — VIRTUAL VISIT (OUTPATIENT)
Dept: FAMILY MEDICINE CLINIC | Age: 65
End: 2020-08-11
Payer: MEDICARE

## 2020-08-11 ENCOUNTER — TELEPHONE (OUTPATIENT)
Dept: FAMILY MEDICINE CLINIC | Age: 65
End: 2020-08-11

## 2020-08-11 VITALS — BODY MASS INDEX: 33.46 KG/M2 | HEIGHT: 71 IN | WEIGHT: 239 LBS

## 2020-08-11 PROCEDURE — 1123F ACP DISCUSS/DSCN MKR DOCD: CPT | Performed by: FAMILY MEDICINE

## 2020-08-11 PROCEDURE — 4040F PNEUMOC VAC/ADMIN/RCVD: CPT | Performed by: FAMILY MEDICINE

## 2020-08-11 PROCEDURE — G0438 PPPS, INITIAL VISIT: HCPCS | Performed by: FAMILY MEDICINE

## 2020-08-11 PROCEDURE — 3017F COLORECTAL CA SCREEN DOC REV: CPT | Performed by: FAMILY MEDICINE

## 2020-08-11 RX ORDER — DILTIAZEM HYDROCHLORIDE 180 MG/1
360 CAPSULE, EXTENDED RELEASE ORAL DAILY
Qty: 180 CAPSULE | Refills: 1 | Status: SHIPPED | OUTPATIENT
Start: 2020-08-11 | End: 2020-11-13 | Stop reason: SDUPTHER

## 2020-08-11 ASSESSMENT — LIFESTYLE VARIABLES
HOW OFTEN DO YOU HAVE A DRINK CONTAINING ALCOHOL: 1
HOW OFTEN DURING THE LAST YEAR HAVE YOU NEEDED AN ALCOHOLIC DRINK FIRST THING IN THE MORNING TO GET YOURSELF GOING AFTER A NIGHT OF HEAVY DRINKING: 0
HOW OFTEN DURING THE LAST YEAR HAVE YOU BEEN UNABLE TO REMEMBER WHAT HAPPENED THE NIGHT BEFORE BECAUSE YOU HAD BEEN DRINKING: 0
HOW OFTEN DO YOU HAVE SIX OR MORE DRINKS ON ONE OCCASION: 0
AUDIT TOTAL SCORE: 1
HOW OFTEN DURING THE LAST YEAR HAVE YOU FOUND THAT YOU WERE NOT ABLE TO STOP DRINKING ONCE YOU HAD STARTED: 0
AUDIT-C TOTAL SCORE: 1
HOW MANY STANDARD DRINKS CONTAINING ALCOHOL DO YOU HAVE ON A TYPICAL DAY: 0
HOW OFTEN DURING THE LAST YEAR HAVE YOU FAILED TO DO WHAT WAS NORMALLY EXPECTED FROM YOU BECAUSE OF DRINKING: 0
HAVE YOU OR SOMEONE ELSE BEEN INJURED AS A RESULT OF YOUR DRINKING: 0
HAS A RELATIVE, FRIEND, DOCTOR, OR ANOTHER HEALTH PROFESSIONAL EXPRESSED CONCERN ABOUT YOUR DRINKING OR SUGGESTED YOU CUT DOWN: 0
HOW OFTEN DURING THE LAST YEAR HAVE YOU HAD A FEELING OF GUILT OR REMORSE AFTER DRINKING: 0

## 2020-08-11 ASSESSMENT — PATIENT HEALTH QUESTIONNAIRE - PHQ9
SUM OF ALL RESPONSES TO PHQ QUESTIONS 1-9: 0
SUM OF ALL RESPONSES TO PHQ QUESTIONS 1-9: 0

## 2020-08-11 NOTE — TELEPHONE ENCOUNTER
Patient voiced concerns with some muscle cramping while doing medicare awv since the changes to his statin at his last visit.

## 2020-08-11 NOTE — PATIENT INSTRUCTIONS
Personalized Preventive Plan for Ermelinda Cotton - 8/11/2020  Medicare offers a range of preventive health benefits. Some of the tests and screenings are paid in full while other may be subject to a deductible, co-insurance, and/or copay. Some of these benefits include a comprehensive review of your medical history including lifestyle, illnesses that may run in your family, and various assessments and screenings as appropriate. After reviewing your medical record and screening and assessments performed today your provider may have ordered immunizations, labs, imaging, and/or referrals for you. A list of these orders (if applicable) as well as your Preventive Care list are included within your After Visit Summary for your review. Other Preventive Recommendations:    · A preventive eye exam performed by an eye specialist is recommended every 1-2 years to screen for glaucoma; cataracts, macular degeneration, and other eye disorders. · A preventive dental visit is recommended every 6 months. · Try to get at least 150 minutes of exercise per week or 10,000 steps per day on a pedometer . · Order or download the FREE \"Exercise & Physical Activity: Your Everyday Guide\" from [employer]. Call 2-302.575.7486 or search [employer] online. · You need 0107-8284 mg of calcium and 2584-3856 IU of vitamin D per day. It is possible to meet your calcium requirement with diet alone, but a vitamin D supplement is usually necessary to meet this goal.  · When exposed to the sun, use a sunscreen that protects against both UVA and UVB radiation with an SPF of 30 or greater. Reapply every 2 to 3 hours or after sweating, drying off with a towel, or swimming. · Always wear a seat belt when traveling in a car. Always wear a helmet when riding a bicycle or motorcycle.

## 2020-08-11 NOTE — TELEPHONE ENCOUNTER
Have him stop the statin for 2 weeks and then restarted  If the leg cramps seem related to that let us know if there is no relationship he should go back on the statin

## 2020-08-11 NOTE — PROGRESS NOTES
Medicare Annual Wellness Visit  Name: Marisol Arellano Date: 2020   MRN: [id number] Sex: Male   Age: 77 y.o. Ethnicity: Non-/Non    : 1955 Race: [id number] [facility] is here for Medicare AWV    Screenings for behavioral, psychosocial and functional/safety risks, and cognitive dysfunction are all negative except as indicated below. These results, as well as other patient data from the [address] [facility] [address] form, are documented in Flowsheets linked to this Encounter. Allergies   Allergen Reactions    Morphine Rash       Prior to Visit Medications    Medication Sig Taking?  Authorizing Provider   rivaroxaban (XARELTO) 20 MG TABS tablet Take 1 tablet by mouth daily (with breakfast) Yes Hugh Hou MD   lisinopril (PRINIVIL;ZESTRIL) 40 MG tablet TAKE ONE TABLET BY MOUTH DAILY Yes Rico Hashimoto, MD   metFORMIN (GLUCOPHAGE-XR) 500 MG extended release tablet TAKE FOUR TABLETS BY MOUTH DAILY WITH BREAKFAST Yes Rico Hashimoto, MD   levothyroxine (SYNTHROID) 100 MCG tablet TAKE ONE TABLET BY MOUTH EVERY EVENING Yes Rico Hashimoto, MD   hydroCHLOROthiazide (HYDRODIURIL) 25 MG tablet TAKE ONE TABLET BY MOUTH EVERY EVENING Yes Sai Liang PA-C   glimepiride (AMARYL) 4 MG tablet TAKE ONE TABLET BY MOUTH TWICE A DAY WITH MEALS Yes Sai Liang PA-C   PARoxetine (PAXIL) 30 MG tablet TAKE ONE TABLET BY MOUTH EVERY MORNING Yes Rico Hashimoto, MD   CARTIA  MG extended release capsule Take 2 capsules by mouth daily Yes Sheila Juarez APRN - CNP   celecoxib (CELEBREX) 200 MG capsule Take 1 capsule by mouth daily Yes Sofia Duarte PA-C   atorvastatin (LIPITOR) 40 MG tablet Take 1 tablet by mouth daily Yes Rico Hashimoto, MD   insulin detemir (LEVEMIR FLEXTOUCH) 100 UNIT/ML injection pen Inject 10 Units into the skin nightly Yes Rico Hashimoto, MD   insulin detemir (LEVEMIR) 100 UNIT/ML injection vial Inject 10 Units into the skin nightly Yes Rico Hashimoto, MD   gabapentin (NEURONTIN) 300 MG capsule  Yes Historical Provider, MD   tiZANidine (ZANAFLEX) 2 MG tablet Take 1 tablet by mouth 3 times daily as needed (muscle spasm) Yes ASH Kessler CNP   aspirin 81 MG tablet Take 81 mg by mouth daily Yes Historical Provider, MD   terbinafine (LAMISIL) 1 % cream Apply topically 2 times daily Apply topically 2 times daily. Yes Historical Provider, MD   Blood Glucose Monitoring Suppl ([employer] IQ SYSTEM) W/DEVICE KIT  Yes Historical Provider, MD   Elizabeth Junior strip  Yes Historical Provider, MD   BD PEN NEEDLE RADU U/F 32G X 4 MM MISC  Yes Historical Provider, MD Angela Campos LANCETS FINE [address] [facility]  Yes Historical Provider, MD   pioglitazone (ACTOS) 15 MG tablet TAKE ONE TABLET BY MOUTH EVERY EVENING  Sheila Mckeon PA-C       Past Medical History:   Diagnosis Date    Arrhythmia     Atrial fibrillation ([address]     Carotid Doppler 03/21/2018    Mild (0-49%) disease of the Bilateral Internal carotid artery. No hemodynamically significant stenosis noted. Normal vertebral flow.  H/O echocardiogram 08/04/2020    EF 50%. Sclerotic aortic valve with mild stenosis. 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 No reversible myocardial ischemia seen. Uneventful pharmacological     History of echocardiogram 01/30/2017    Ejection fraction is visually estimated at 55%. mildly Increased LVOT velocity. Aortic valve leaflets are somewhat thickened. Aortic valve appears tricuspid. Trivial aortic regurgitation is noted. Mitral annular calcification is present. Mild calcification of the anterior leaflet of the mitral valve.     Hyperlipidemia     Hypertension        Past Surgical History:   Procedure Laterality Date    OTHER SURGICAL HISTORY  08/22/2018    Atrial Fib cardiac Ablation       Family History   Problem Relation Age of Onset    Heart Disease Father        CareTeam (Including mass index is 33.33 kg/m².   Health Habits/Nutrition Interventions:  · Inadequate physical activity:  patient is not ready to increase his/her physical activity level at this time  · Dental exam overdue:  patient encouraged to make appointment with his/her dentist    Hearing/Vision:  No exam data present  Hearing/Vision  Do you or your family notice any trouble with your hearing?: No  Do you have difficulty driving, watching TV, or doing any of your daily activities because of your eyesight?: No  Have you had an eye exam within the past year?: (!) No  Hearing/Vision Interventions:  · Vision concerns:  patient encouraged to make appointment with his/her eye specialist    Personalized Preventive Plan   Current Health Maintenance Status  Immunization History   Administered Date(s) Administered    DTaP, 5 Pertussis Antigens (Daptacel) 08/23/2012    Influenza A (O4N0-48) Vaccine PF IM 11/03/2009    Influenza Vaccine, unspecified formulation 11/08/2016    Influenza, High Dose (Fluzone 65 yrs and older) 09/27/2018    Pneumococcal Conjugate 13-valent (Aureila Rather) 10/21/2017    Pneumococcal Polysaccharide (Wwsoideok02) 09/10/2016    Zoster Live (Zostavax) 09/10/2016        Health Maintenance   Topic Date Due    Hepatitis C screen  1955    Diabetic retinal exam  02/01/1963    Diabetic microalbuminuria test  02/01/1971    Colon cancer screen colonoscopy  02/01/2003    Shingles Vaccine (2 of 3) 11/05/2016    Annual Wellness Visit (AWV)  05/29/2019    Diabetic foot exam  09/27/2019    A1C test (Diabetic or Prediabetic)  08/13/2020    Lipid screen  08/13/2020    Potassium monitoring  08/13/2020    Creatinine monitoring  08/13/2020    Flu vaccine (1) 09/01/2020    Pneumococcal 65+ years Vaccine (2 of 2 - PPSV23) 09/10/2021    DTaP/Tdap/Td vaccine (2 - Tdap) 08/23/2022    Hepatitis A vaccine  Aged Out    Hib vaccine  Aged Out    Meningococcal (ACWY) vaccine  Aged Out     Recommendations for Preventive Services Due: see orders and patient instructions/AVS.    Unable to obtain three vital signs due to patient not having equipment to take temperature or BP. Recommended screening schedule for the next 5-10 years is provided to the patient in written form: see Patient Instructions/AVS.    Fabiola Begum LPN, 1/51/6118, performed the documented evaluation under the direct supervision of the attending physician. Michaela Vasquez is a 77 y.o. male being evaluated by a Virtual Visit (audio visit) encounter to address concerns as mentioned above. A caregiver was present when appropriate. Due to this being a TeleHealth encounter (During [facility] public health emergency), evaluation of the following organ systems was limited: Vitals/Constitutional/EENT/Resp/CV/GI//MS/Neuro/Skin/Heme-Lymph-Imm. Pursuant to the emergency declaration under the [address] [place] [address] and the [employer] and [employer] Act, this Virtual Visit was conducted with patient's (and/or legal guardian's) consent, to reduce the patient's risk of exposure to COVID-19 and provide necessary medical care. The patient (and/or legal guardian) has also been advised to contact this office for worsening conditions or problems, and seek emergency medical treatment and/or call 911 if deemed necessary. Patient identification was verified at the start of the visit: Yes    Total time spent for this encounter: 15 minutes    Services were provided through audio synchronous discussion virtually to substitute for in-person clinic visit. Patient and provider were located at their individual homes. This encounter was performed under myRocky MDs, direct supervision, 8/11/2020.      --Dennis Medina LPN on 8/79/9246 at 4:44 PM    An electronic signature was used to authenticate this note.

## 2020-09-08 RX ORDER — PIOGLITAZONEHYDROCHLORIDE 15 MG/1
TABLET ORAL
Qty: 16 TABLET | Refills: 1 | OUTPATIENT
Start: 2020-09-08 | End: 2020-10-08

## 2020-09-10 ENCOUNTER — TELEPHONE (OUTPATIENT)
Dept: CARDIOLOGY CLINIC | Age: 65
End: 2020-09-10

## 2020-09-10 ENCOUNTER — APPOINTMENT (OUTPATIENT)
Dept: GENERAL RADIOLOGY | Age: 65
End: 2020-09-10
Payer: MEDICARE

## 2020-09-10 ENCOUNTER — HOSPITAL ENCOUNTER (EMERGENCY)
Age: 65
Discharge: HOME | End: 2020-09-10
Payer: MEDICARE

## 2020-09-10 VITALS
OXYGEN SATURATION: 98 % | SYSTOLIC BLOOD PRESSURE: 142 MMHG | DIASTOLIC BLOOD PRESSURE: 70 MMHG | HEIGHT: 71 IN | RESPIRATION RATE: 20 BRPM | TEMPERATURE: 98.3 F | BODY MASS INDEX: 33.32 KG/M2 | WEIGHT: 238 LBS | HEART RATE: 80 BPM

## 2020-09-10 PROCEDURE — 2500000003 HC RX 250 WO HCPCS: Performed by: PHYSICIAN ASSISTANT

## 2020-09-10 PROCEDURE — 4500000028 HC INTERMEDIATE PROCEDURE

## 2020-09-10 PROCEDURE — 2580000003 HC RX 258: Performed by: PHYSICIAN ASSISTANT

## 2020-09-10 PROCEDURE — 6360000002 HC RX W HCPCS: Performed by: PHYSICIAN ASSISTANT

## 2020-09-10 PROCEDURE — 99283 EMERGENCY DEPT VISIT LOW MDM: CPT

## 2020-09-10 PROCEDURE — 90715 TDAP VACCINE 7 YRS/> IM: CPT | Performed by: PHYSICIAN ASSISTANT

## 2020-09-10 PROCEDURE — 96365 THER/PROPH/DIAG IV INF INIT: CPT

## 2020-09-10 PROCEDURE — 90471 IMMUNIZATION ADMIN: CPT | Performed by: PHYSICIAN ASSISTANT

## 2020-09-10 PROCEDURE — 6370000000 HC RX 637 (ALT 250 FOR IP): Performed by: PHYSICIAN ASSISTANT

## 2020-09-10 PROCEDURE — 73630 X-RAY EXAM OF FOOT: CPT

## 2020-09-10 RX ORDER — AMOXICILLIN AND CLAVULANATE POTASSIUM 875; 125 MG/1; MG/1
1 TABLET, FILM COATED ORAL 2 TIMES DAILY
Qty: 14 TABLET | Refills: 0 | Status: SHIPPED | OUTPATIENT
Start: 2020-09-10 | End: 2020-09-17

## 2020-09-10 RX ORDER — HYDROCODONE BITARTRATE AND ACETAMINOPHEN 5; 325 MG/1; MG/1
1 TABLET ORAL EVERY 4 HOURS PRN
Qty: 12 TABLET | Refills: 0 | Status: SHIPPED | OUTPATIENT
Start: 2020-09-10 | End: 2020-09-13

## 2020-09-10 RX ORDER — LIDOCAINE HYDROCHLORIDE 20 MG/ML
5 INJECTION, SOLUTION EPIDURAL; INFILTRATION; INTRACAUDAL; PERINEURAL ONCE
Status: COMPLETED | OUTPATIENT
Start: 2020-09-10 | End: 2020-09-10

## 2020-09-10 RX ADMIN — Medication: at 12:31

## 2020-09-10 RX ADMIN — AMPICILLIN SODIUM AND SULBACTAM SODIUM 3 G: 2; 1 INJECTION, POWDER, FOR SOLUTION INTRAMUSCULAR; INTRAVENOUS at 09:56

## 2020-09-10 RX ADMIN — LIDOCAINE HYDROCHLORIDE 5 ML: 20 INJECTION, SOLUTION EPIDURAL; INFILTRATION; INTRACAUDAL; PERINEURAL at 09:55

## 2020-09-10 RX ADMIN — TETANUS TOXOID, REDUCED DIPHTHERIA TOXOID AND ACELLULAR PERTUSSIS VACCINE, ADSORBED 0.5 ML: 5; 2.5; 8; 8; 2.5 SUSPENSION INTRAMUSCULAR at 10:31

## 2020-09-10 NOTE — ED NOTES
Patient updated on plan of care. Resting in bed with no signs of distress. Remains on cardiac monitor. Resps even and unlabored. Denies any needs. Side rails up x 2.       Sonya Bronson RN  09/10/20 [id number]

## 2020-09-10 NOTE — ED NOTES
Patient updated on plan of care. Resting in bed with no signs of distress. Remains on cardiac monitor. Resps even and unlabored. Denies any needs. Side rails up x 2.       Cooper Carlton RN  09/10/20 [id number]

## 2020-09-10 NOTE — PROGRESS NOTES
Called by ED, re a dog bite and amputation, NO bleeding will NOT close the wound, needs WTD dressing changes and Antibiotics, and will follow as OP.     Sofie Cardona MD, FACS, FICS  9/10/2020  9:21 AM

## 2020-09-10 NOTE — ED NOTES
Patient updated on plan of care. Resting in bed with no signs of distress. Remains on cardiac monitor. Resps even and unlabored. Denies any needs. Side rails up x 2.       Jeniffer Sommer RN  09/10/20 2632

## 2020-09-10 NOTE — ED PROVIDER NOTES
EMERGENCY DEPARTMENT ENCOUNTER      PCP: Radha Cook MD    CHIEF COMPLAINT    Chief Complaint   Patient presents with    Toe Injury     left         This patient was not evaluated by the attending physician. I have independently evaluated this patient . HPI    Nilson Dennison is a 77 y.o. male who presents with left foot toe partial amputation. Onset prior to arrival.  Context is patient states his dog \"chewed off my toe\". Patient states he has diabetic neuropathy of both feet and upon awakening this morning notes his dog was chewing on his left foot second toe and notes bleeding. He has no associated pain due to baseline diabetic neuropathy. No distal numbness, tingling, weakness, or functional deficit. No other pain. Unknown last tetanus. REVIEW OF SYSTEMS    General: Denies constitutional symptoms. Cardiac: Denies chest wall injury or chest pain  Pulmonary: Denies chest wall injury or shortness of breath  GI: Denies abdomen injury or abdominal pain  Musculoskeletal:  Denies any other musculoskeletal pain or injuries, including chest wall and back. Skin: See HPI. Lymphatics:  No nodules or streaks. See HPI and nursing notes for additional information     PAST MEDICAL & SURGICAL HISTORY    Past Medical History:   Diagnosis Date    Arrhythmia     Atrial fibrillation ([address]     Carotid Doppler 03/21/2018    Mild (0-49%) disease of the Bilateral Internal carotid artery. No hemodynamically significant stenosis noted. Normal vertebral flow.  Diabetes mellitus ([address]     H/O echocardiogram 08/04/2020    EF 50%. Sclerotic aortic valve with mild stenosis. 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 No reversible myocardial ischemia seen. Uneventful pharmacological     History of echocardiogram 01/30/2017    Ejection fraction is visually estimated at 55%. mildly Increased LVOT velocity. Aortic valve leaflets are somewhat thickened. Aortic valve appears tricuspid. Trivial aortic regurgitation is noted. Mitral annular calcification is present. Mild calcification of the anterior leaflet of the mitral valve.  Hyperlipidemia     Hypertension      Past Surgical History:   Procedure Laterality Date    OTHER SURGICAL HISTORY  08/22/2018    Atrial Fib cardiac Ablation       CURRENT MEDICATIONS    Current Outpatient Rx   Medication Sig Dispense Refill    amoxicillin-clavulanate (AUGMENTIN) 875-125 MG per tablet Take 1 tablet by mouth 2 times daily for 7 days 14 tablet 0    HYDROcodone-acetaminophen (NORCO) 5-325 MG per tablet Take 1 tablet by mouth every 4 hours as needed for Pain for up to 3 days.  12 tablet 0    rivaroxaban (XARELTO) 20 MG TABS tablet Take 1 tablet by mouth daily (with breakfast) 14 tablet 0    metFORMIN (GLUCOPHAGE-XR) 500 MG extended release tablet TAKE FOUR TABLETS BY MOUTH DAILY WITH BREAKFAST 360 tablet 0    PARoxetine (PAXIL) 30 MG tablet TAKE ONE TABLET BY MOUTH EVERY MORNING 30 tablet 5    celecoxib (CELEBREX) 200 MG capsule Take 1 capsule by mouth daily 90 capsule 1    atorvastatin (LIPITOR) 40 MG tablet Take 1 tablet by mouth daily 90 tablet 1    insulin detemir (LEVEMIR) 100 UNIT/ML injection vial Inject 10 Units into the skin nightly 3 vial 1    CARTIA  MG extended release capsule Take 2 capsules by mouth daily 180 capsule 1    lisinopril (PRINIVIL;ZESTRIL) 40 MG tablet TAKE ONE TABLET BY MOUTH DAILY 90 tablet 0    levothyroxine (SYNTHROID) 100 MCG tablet TAKE ONE TABLET BY MOUTH EVERY EVENING 90 tablet 0    hydroCHLOROthiazide (HYDRODIURIL) 25 MG tablet TAKE ONE TABLET BY MOUTH EVERY EVENING 90 tablet 1    glimepiride (AMARYL) 4 MG tablet TAKE ONE TABLET BY MOUTH TWICE A DAY WITH MEALS 180 tablet 1    pioglitazone (ACTOS) 15 MG tablet TAKE ONE TABLET BY MOUTH EVERY EVENING 30 tablet 0    insulin detemir (LEVEMIR FLEXTOUCH) 100 UNIT/ML injection pen Inject 10 Units into the skin nightly 5 pen 3    gabapentin (NEURONTIN) 300 MG capsule       tiZANidine (ZANAFLEX) 2 MG tablet Take 1 tablet by mouth 3 times daily as needed (muscle spasm) 30 tablet 0    aspirin 81 MG tablet Take 81 mg by mouth daily      terbinafine (LAMISIL) 1 % cream Apply topically 2 times daily Apply topically 2 times daily.  Blood Glucose Monitoring Suppl ([employer]) W/DEVICE KIT       ONETOUCH VERIO strip       BD PEN NEEDLE RADU U/F 32G X 4 MM MISC       ONETOUCH DELICA LANCETS FINE MISC          ALLERGIES    Allergies   Allergen Reactions    Morphine Rash       SOCIAL & FAMILY HISTORY    Social History     Socioeconomic History    Marital status:       Spouse name: None    Number of children: None    Years of education: None    Highest education level: None   Occupational History    None   Social Needs    Financial resource strain: None    Food insecurity     Worry: None     Inability: None    Transportation needs     Medical: None     Non-medical: None   Tobacco Use    Smoking status: Never Smoker    Smokeless tobacco: Never Used   Substance and Sexual Activity    Alcohol use: Yes     Comment: caffeine 2 cups of coffee a day    Drug use: No    Sexual activity: None   Lifestyle    Physical activity     Days per week: None     Minutes per session: None    Stress: None   Relationships    Social connections     Talks on phone: None     Gets together: None     Attends [employer] service: None     Active member of club or organization: None     Attends meetings of clubs or organizations: None     Relationship status: None    Intimate partner violence     Fear of current or ex partner: None     Emotionally abused: None     Physically abused: None     Forced sexual activity: None   Other Topics Concern    None   Social History Narrative    None     Family History   Problem Relation Age of Onset    Heart Disease Father            PHYSICAL EXAM    VITAL SIGNS: BP (!) 151/76   Pulse 88   Temp 98.3 °F (36.8 °C) (Oral)   Resp 18   Ht 5' 11\" (1.803 m)   Wt 238 lb (108 kg)   SpO2 98%   BMI 33.19 kg/m²   Constitutional:  Well developed, well nourished, no acute distress, non-toxic appearance   HENT:  Atraumatic  Lungs: Patient initially anxious appearing and respiratory rate 22. After procedure and wound dressing applied, patient's respiratory rate improved to normal range. Musculoskeletal:   Left foot exam: There is amputation of the distal phalanx of the left second toe. This appears to have occurred at the DIP joint. There is mild trickling capillary bleeding without active venous or arterial bleeding. No obvious foreign body. I am able to palpate the bony end of either the middle phalanx or partial piece of the distal phalanx. Great toe reveals absent nail plate. Otherwise nailbed and great toe intact with no other deformity or abnormalities. There is no bleeding. Remaining foot and ankle exam without abnormality. Examination of remaining extremities reveals active ROM of  joints without ligamentous laxity. Theres no obvious joint or bony deformity. Lymphatics:  No swollen nodes or streaks. Integument:    See HPI. Vascular: affected extremity distally neurovascularly intact - pulses, sensation, and capillary refill intact. Neurologic:  Awake alert, normal flow of speech. Cranial nerves II through XII grossly intact.   Psychiatric: Cooperative, pleasant affect      RADIOLOGY/PROCEDURES    XR FOOT LEFT (MIN 3 VIEWS)   Preliminary Result   Amputation of the 2nd digit at the level of the middle phalanx with   associated soft tissue swelling.             ________________________________________________________________________       Procedure Note - Juan Pablo Kat PA-C      Laceration Repair Procedure Note    Indication: Skin Laceration    Procedure:   - Procedure explained, including risks and benefits explained to the patient who expressed understanding. All questions were answered. Verbal consent obtained. - The Wound was prepped and draped in the usual sterile fashion using Betadine and sterile saline.  - The wound is anesthetized using 2% Lidocaine, approximately 1.5 ml, digital block  - Wound was explored to it's depth, there appears to be partial amputation, amputation at DIP joint versus phalanx shaft.  - Wound was irrigated with copious amounts of sterile saline, Hibiclens, hydrogen peroxide and mechanically debrided utilizing sterile gauze. No foreign body seen. - Hemostasis was achieved with Avitene hemostatic pad as well as Xeroform gauze and sterile 2 x 2 dressing. And good cosmesis was achieved. Blood loss minimal.  - The wound area was then dressed with Sterile nonstick dressing, sterile gauze, and tape. - Patient tolerated procedure well without complications. ________________________________________________________________________            ED COURSE & MEDICAL DECISION MAKING        Patient presents as above with partial amputation of the left second toe. There is also nail plate avulsion of the great toe. Wounds were cleaned with copious irrigation, Hibiclens, Betadine, hydrogen peroxide-see procedure note above. 2347 - I discussed patient case with general surgeon on-call, Dr. Kiley Vargas.  He recommends cleaning wound, dressing in a wet-to-dry fashion, provide Abx Rx and Pt to call his office for recheck over the next several days. Patient understands and agrees with this plan. In addition to this above plan, patient was given crutches for weightbearing assistance and postop shoe after wet to dry dressing was placed. Patient agrees to call general surgeon office today for recheck appointment over the next several days  Augmentin Rx provided and patient received first dose of Unasyn IV in ED.   Of note, wound care, dressing change discussed in detail with family member prior to discharge. Patient agrees to return emergency department if symptoms worsen or any new symptoms develop. Clinical  IMPRESSION    1. Traumatic amputation of second toe of left foot, initial encounter ([facility]    2. Dog bite, initial encounter              Comment: Please note this report has been produced using speech recognition software and may contain errors related to that system including errors in grammar, punctuation, and spelling, as well as words and phrases that may be inappropriate. If there are any questions or concerns please feel free to contact the dictating provider for clarification.        Logan Fraser, [address]  09/10/20 1111

## 2020-09-11 ENCOUNTER — OFFICE VISIT (OUTPATIENT)
Dept: SURGERY | Age: 65
End: 2020-09-11
Payer: MEDICARE

## 2020-09-11 VITALS
HEART RATE: 90 BPM | OXYGEN SATURATION: 97 % | RESPIRATION RATE: 16 BRPM | BODY MASS INDEX: 33.42 KG/M2 | SYSTOLIC BLOOD PRESSURE: 122 MMHG | TEMPERATURE: 97.3 F | HEIGHT: 71 IN | DIASTOLIC BLOOD PRESSURE: 52 MMHG | WEIGHT: 238.7 LBS

## 2020-09-11 PROCEDURE — 3017F COLORECTAL CA SCREEN DOC REV: CPT | Performed by: SURGERY

## 2020-09-11 PROCEDURE — 99203 OFFICE O/P NEW LOW 30 MIN: CPT | Performed by: SURGERY

## 2020-09-11 PROCEDURE — 1123F ACP DISCUSS/DSCN MKR DOCD: CPT | Performed by: SURGERY

## 2020-09-11 PROCEDURE — 1036F TOBACCO NON-USER: CPT | Performed by: SURGERY

## 2020-09-11 PROCEDURE — G8427 DOCREV CUR MEDS BY ELIG CLIN: HCPCS | Performed by: SURGERY

## 2020-09-11 PROCEDURE — 4040F PNEUMOC VAC/ADMIN/RCVD: CPT | Performed by: SURGERY

## 2020-09-11 PROCEDURE — G8417 CALC BMI ABV UP PARAM F/U: HCPCS | Performed by: SURGERY

## 2020-09-11 NOTE — PROGRESS NOTES
GENERAL SURGERY OUTPATIENT HISTORY & PHYSICAL NOTE  [facility]    PATIENT: Baby Done 1955, 77 y.o., male  MRN: [id number]    Physician: Gregorio Izquierdo MD    Date: 9/11/20    Reason for Evaluation:     Chief Complaint   Patient presents with    Follow-Up from Hospital     NP ED 09/10/20 traumatic amputation 2nd L toe god bit off toe       Requesting Physician:  ED Referral    CHIEF COMPLAINT:     Chief Complaint   Patient presents with    Follow-Up from Hospital     NP ED 09/10/20 traumatic amputation 2nd L toe god bit off toe     History Obtained From:  patient, electronic medical record    HISTORY OF PRESENT ILLNESS:    Ray Crain is a 77 y.o. male presenting with traumatic partial amputation of the left 2nd toe. He has a Jessa Soulier who chewed on his foot while he was asleep. He has diabetic neuropathy, so he didn't feel anything. He has had problems with his big toenail falling off before, thinks the dog also removed his left great toenail. He noticed it on Thursday AM and noted bleeding. He presented to the ED and a hemostatic agent was applied. He is taking Augmentin. Got a Tetanus shot. He is on Xarelto for Afib - is holding it until Sunday due to the bleeding. He denies any pain due to the neuropathy. Past Medical History:    Past Medical History:   Diagnosis Date    Arrhythmia     Atrial fibrillation ([address]     Carotid Doppler 03/21/2018    Mild (0-49%) disease of the Bilateral Internal carotid artery. No hemodynamically significant stenosis noted. Normal vertebral flow.  Diabetes mellitus ([address]     H/O echocardiogram 08/04/2020    EF 50%. Sclerotic aortic valve with mild stenosis. 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 No reversible myocardial ischemia seen.    Uneventful pharmacological     History of echocardiogram 01/30/2017    Ejection fraction is visually estimated at 55%. mildly Increased LVOT velocity. Aortic valve leaflets are somewhat thickened. Aortic valve appears tricuspid. Trivial aortic regurgitation is noted. Mitral annular calcification is present. Mild calcification of the anterior leaflet of the mitral valve.     Hyperlipidemia     Hypertension     WD-Dog bite of toe left 2nd toe 9/17/2020    WD-Open wound of left great toe due to dog bite 9/17/2020       Past Surgical History:    Past Surgical History:   Procedure Laterality Date    OTHER SURGICAL HISTORY  08/22/2018    Atrial Fib cardiac Ablation       Current Medications:   Current Outpatient Medications   Medication Sig Dispense Refill    CARTIA  MG extended release capsule Take 2 capsules by mouth daily 180 capsule 1    rivaroxaban (XARELTO) 20 MG TABS tablet Take 1 tablet by mouth daily (with breakfast) 14 tablet 0    lisinopril (PRINIVIL;ZESTRIL) 40 MG tablet TAKE ONE TABLET BY MOUTH DAILY 90 tablet 0    metFORMIN (GLUCOPHAGE-XR) 500 MG extended release tablet TAKE FOUR TABLETS BY MOUTH DAILY WITH BREAKFAST 360 tablet 0    levothyroxine (SYNTHROID) 100 MCG tablet TAKE ONE TABLET BY MOUTH EVERY EVENING 90 tablet 0    hydroCHLOROthiazide (HYDRODIURIL) 25 MG tablet TAKE ONE TABLET BY MOUTH EVERY EVENING 90 tablet 1    glimepiride (AMARYL) 4 MG tablet TAKE ONE TABLET BY MOUTH TWICE A DAY WITH MEALS 180 tablet 1    PARoxetine (PAXIL) 30 MG tablet TAKE ONE TABLET BY MOUTH EVERY MORNING 30 tablet 5    celecoxib (CELEBREX) 200 MG capsule Take 1 capsule by mouth daily 90 capsule 1    atorvastatin (LIPITOR) 40 MG tablet Take 1 tablet by mouth daily 90 tablet 1    pioglitazone (ACTOS) 15 MG tablet TAKE ONE TABLET BY MOUTH EVERY EVENING 30 tablet 0    insulin detemir (LEVEMIR FLEXTOUCH) 100 UNIT/ML injection pen Inject 10 Units into the skin nightly 5 pen 3    insulin detemir (LEVEMIR) 100 UNIT/ML injection vial Inject 10 Units into the skin nightly 3 vial 1    gabapentin Constitutional: Negative for chills and fever. HENT: Negative for congestion and sore throat. Eyes: Negative for discharge and redness. Respiratory: Negative for chest tightness and shortness of breath. Cardiovascular: Negative for chest pain and palpitations. Gastrointestinal: Negative for abdominal distention, abdominal pain, constipation, diarrhea, nausea and vomiting. Genitourinary: Negative for dysuria and flank pain. Musculoskeletal: Negative for arthralgias and myalgias. Skin: Positive for wound. Negative for color change and rash. Neurological: Positive for numbness (peripheral neuropathy). Negative for dizziness. Hematological: Bruises/bleeds easily. Psychiatric/Behavioral: Negative for confusion. The patient is not nervous/anxious. I have reviewed the patient's information pertinent to this visit, including medical history, family history, social history and review of systems. PHYSICAL EXAM:    Vitals:    09/11/20 0941   BP: (!) 122/52   Pulse: 90   Resp: 16   Temp: 97.3 °F (36.3 °C)   TempSrc: Infrared   SpO2: 97%   Weight: 238 lb 11.2 oz (108.3 kg)   Height: 5' 11\" (1.803 m)     Physical Exam  Constitutional:       General: He is not in acute distress. Appearance: He is well-developed. He is not diaphoretic. HENT:      Head: Normocephalic and atraumatic. Eyes:      General:         Right eye: No discharge. Left eye: No discharge. Pupils: Pupils are equal, round, and reactive to light. Neck:      Musculoskeletal: Neck supple. Trachea: No tracheal deviation. Cardiovascular:      Rate and Rhythm: Normal rate and regular rhythm. Pulmonary:      Effort: Pulmonary effort is normal. No respiratory distress. Breath sounds: No wheezing. Abdominal:      General: There is no distension. Palpations: Abdomen is soft. Tenderness: There is no abdominal tenderness. There is no guarding or rebound.    Musculoskeletal:         General: No tenderness or deformity. Skin:     General: Skin is warm and dry. Findings: No rash. Neurological:      Mental Status: He is alert and oriented to person, place, and time. Psychiatric:         Behavior: Behavior normal.         DATA:    Lab Results   Component Value Date    WBC 10.7 (H) 08/23/2018    HGB 12.3 (L) 08/23/2018    HCT 36.9 (L) 08/23/2018     08/23/2018     08/13/2019    K 4.9 08/13/2019    CL 94 (L) 08/13/2019    CO2 29 08/13/2019    BUN 30 (H) 08/13/2019    CREATININE 1.0 08/13/2019    GLUCOSE 180 (H) 08/13/2019    CALCIUM 10.2 08/13/2019    PROT 7.2 08/13/2019    BILITOT <0.2 08/13/2019    AST 29 08/13/2019    ALT 22 08/13/2019    ALKPHOS 100 08/13/2019    INR 1.15 08/20/2018    GLUF 215 (H) 01/28/2017    LABA1C 7.3 08/13/2019       Imaging:   Xr Foot Left (min 3 Views)    Result Date: 9/11/2020  EXAMINATION: THREE XRAY VIEWS OF THE LEFT FOOT 9/10/2020 9:11 am COMPARISON: 11/01/2007 HISTORY: ORDERING SYSTEM PROVIDED HISTORY: partial toe amputation - dog bite TECHNOLOGIST PROVIDED HISTORY: Reason for exam:->partial toe amputation - dog bite Reason for Exam: partial toe amputation - dog bite Acuity: Acute Type of Exam: Initial FINDINGS: Amputation of the 2nd digit at the level of the middle phalanx is noted. There is soft tissue swelling. Posttraumatic changes are noted in the 3rd and 4th metatarsals. Vascular calcifications. Joint space and alignment otherwise maintained. No other acute fracture or dislocation. Calcaneal spurs. Amputation of the 2nd digit at the level of the middle phalanx with associated soft tissue swelling. Pertinent laboratory and imaging studies were personally reviewed if available. IMPRESSION:    Chepe Woods is a 77 y.o. male with traumatic (dog bite) partial amputation of the left 2nd toe and left great toenail    1.  Traumatic amputation of second toe of left foot, initial encounter [facility])      Patient Active Problem List Diagnosis Date Noted    S/P ablation of atrial fibrillation 08/22/2018     Priority: High    Type 2 diabetes mellitus without complication, without long-term current use of insulin (HCC)      Priority: High    Essential hypertension      Priority: High    Compression neuropathy of upper extremity      Priority: High    PAF (paroxysmal atrial fibrillation) ([facility])      Priority: High    WD-Dog bite of toe left 2nd toe 09/17/2020    WD-Open wound of left great toe due to dog bite 09/17/2020    Episode of recurrent major depressive disorder ([facility] [address] 06/18/2020    History of loop recorder 11/18/2019    Hyperlipidemia 04/26/2019    Hypertension     Atrial fibrillation ([facility] [address]     Carotid artery disease ([facility] [address] 03/08/2018    Dyslipidemia 03/08/2018    Brachial plexopathy 05/22/2017    Disorder of autonomic nervous system 03/20/2017    BPN (brachial plexus neuropathy) 03/20/2017    Cervical nerve root disorder 03/20/2017    Disorder of a single nerve 03/20/2017    Radial nerve palsy 01/30/2017     PLAN:  · Discussed findings and options with Michael Norton. No apparent exposed bone in the wound base, but exam was somewhat limited due to oozing. Silver nitrate applied for hemostasis. · Will refer to wound care - he wishes to be seen in [facility] as this is closer to his home  · Continue abx as prescribed  · Discussed that he may require a more formal amputation of the toe, but for now will proceed with local wound care and antibiotics. He expressed understanding and agreement with this plan. Follow Up: Return in about 2 weeks (around 9/25/2020) for for re-check. Orders Placed This Encounter   Procedures    [address]        No orders of the defined types were placed in this encounter.   ·       Electronically signed by Oumou Chacko MD, 9/20/2020, 10:43 PM

## 2020-09-17 ENCOUNTER — HOSPITAL ENCOUNTER (OUTPATIENT)
Dept: WOUND CARE | Age: 65
Discharge: HOME | End: 2020-09-17
Payer: MEDICARE

## 2020-09-17 VITALS
WEIGHT: 238 LBS | DIASTOLIC BLOOD PRESSURE: 74 MMHG | RESPIRATION RATE: 20 BRPM | BODY MASS INDEX: 33.32 KG/M2 | TEMPERATURE: 98.3 F | HEIGHT: 71 IN | HEART RATE: 83 BPM | SYSTOLIC BLOOD PRESSURE: 144 MMHG

## 2020-09-17 PROBLEM — S91.152A: Status: ACTIVE | Noted: 2020-09-17

## 2020-09-17 PROBLEM — S91.159A: Status: ACTIVE | Noted: 2020-09-17
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
## 2020-09-17 PROCEDURE — 87070 CULTURE OTHR SPECIMN AEROBIC: CPT

## 2020-09-17 PROCEDURE — 87186 SC STD MICRODIL/AGAR DIL: CPT

## 2020-09-17 PROCEDURE — 87075 CULTR BACTERIA EXCEPT BLOOD: CPT

## 2020-09-17 PROCEDURE — 11044 DBRDMT BONE 1ST 20 SQ CM/<: CPT

## 2020-09-17 PROCEDURE — 87077 CULTURE AEROBIC IDENTIFY: CPT

## 2020-09-17 PROCEDURE — 99213 OFFICE O/P EST LOW 20 MIN: CPT

## 2020-09-17 NOTE — PROGRESS NOTES
[facility] Initial Visit      Juanita Norton  AGE: 77 y.o. GENDER: male  : 1955  EPISODE DATE:  2020   Referred by:Yoni Capone MD    Subjective:     CHIEF COMPLAINT dog biteleft toes     HISTORY of PRESENT ILLNESS      Veronica Mariee is a 77 y.o. male who presents to the [address] [place] [address] for an initial visit for evaluation and treatment of Acute traumatic  ulcer(s) of  toes left, great toe, 2nd toe. The condition is of marked severity. The ulcer has been present for 1 weeks. The underlying cause is thought to be dog bite. The patients care to date has included ER VISIT. The patient has significant underlying medical conditions as below. Wound Pain Timing/Severity: none  Quality of pain: N/A  Severity of pain:  0 / 10   Modifying Factors: diabetes, obesity and anticoagulation therapy  Associated Signs/Symptoms: none        PAST MEDICAL HISTORY        Diagnosis Date    Arrhythmia     Atrial fibrillation ([address]     Carotid Doppler 2018    Mild (0-49%) disease of the Bilateral Internal carotid artery. No hemodynamically significant stenosis noted. Normal vertebral flow.  Diabetes mellitus ([address]     H/O echocardiogram 2020    EF 50%. Sclerotic aortic valve with mild stenosis. 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 No reversible myocardial ischemia seen. Uneventful pharmacological     History of echocardiogram 2017    Ejection fraction is visually estimated at 55%. mildly Increased LVOT velocity. Aortic valve leaflets are somewhat thickened. Aortic valve appears tricuspid. Trivial aortic regurgitation is noted. Mitral annular calcification is present. Mild calcification of the anterior leaflet of the mitral valve.     Hyperlipidemia     Hypertension     WD-Dog bite of toe left 2nd toe 2020    WD-Open wound of left great toe due to dog bite 2020       PAST SURGICAL HISTORY    Past Surgical History:   Procedure Laterality Date    OTHER SURGICAL HISTORY  08/22/2018    Atrial Fib cardiac Ablation       FAMILY HISTORY    Family History   Problem Relation Age of Onset    Heart Disease Father        SOCIAL HISTORY    Social History     Tobacco Use    Smoking status: Never Smoker    Smokeless tobacco: Never Used   Substance Use Topics    Alcohol use: Yes     Comment: caffeine 2 cups of coffee a day    Drug use: No       ALLERGIES    Allergies   Allergen Reactions    Morphine Rash       MEDICATIONS    Current Outpatient Medications on File Prior to Encounter   Medication Sig Dispense Refill    amoxicillin-clavulanate (AUGMENTIN) 875-125 MG per tablet Take 1 tablet by mouth 2 times daily for 7 days 14 tablet 0    CARTIA  MG extended release capsule Take 2 capsules by mouth daily 180 capsule 1    rivaroxaban (XARELTO) 20 MG TABS tablet Take 1 tablet by mouth daily (with breakfast) 14 tablet 0    lisinopril (PRINIVIL;ZESTRIL) 40 MG tablet TAKE ONE TABLET BY MOUTH DAILY 90 tablet 0    metFORMIN (GLUCOPHAGE-XR) 500 MG extended release tablet TAKE FOUR TABLETS BY MOUTH DAILY WITH BREAKFAST 360 tablet 0    levothyroxine (SYNTHROID) 100 MCG tablet TAKE ONE TABLET BY MOUTH EVERY EVENING 90 tablet 0    hydroCHLOROthiazide (HYDRODIURIL) 25 MG tablet TAKE ONE TABLET BY MOUTH EVERY EVENING 90 tablet 1    glimepiride (AMARYL) 4 MG tablet TAKE ONE TABLET BY MOUTH TWICE A DAY WITH MEALS 180 tablet 1    PARoxetine (PAXIL) 30 MG tablet TAKE ONE TABLET BY MOUTH EVERY MORNING 30 tablet 5    celecoxib (CELEBREX) 200 MG capsule Take 1 capsule by mouth daily 90 capsule 1    atorvastatin (LIPITOR) 40 MG tablet Take 1 tablet by mouth daily 90 tablet 1    insulin detemir (LEVEMIR FLEXTOUCH) 100 UNIT/ML injection pen Inject 10 Units into the skin nightly 5 pen 3    insulin detemir (LEVEMIR) 100 UNIT/ML injection vial Inject 10 Units into the skin nightly 3 vial 1    gabapentin (NEURONTIN) 300 MG capsule       tiZANidine (ZANAFLEX) 2 MG tablet Take 1 tablet by mouth 3 times daily as needed (muscle spasm) 30 tablet 0    aspirin 81 MG tablet Take 81 mg by mouth daily      terbinafine (LAMISIL) 1 % cream Apply topically 2 times daily Apply topically 2 times daily.  Blood Glucose Monitoring Suppl (ONETOUCH VERIO IQ SYSTEM) W/DEVICE KIT       ONETOUCH VERIO strip       BD PEN NEEDLE RADU U/F 32G X 4 MM MISC       ONETOUCH DELICA LANCETS FINE MISC       pioglitazone (ACTOS) 15 MG tablet TAKE ONE TABLET BY MOUTH EVERY EVENING 30 tablet 0     No current facility-administered medications on file prior to encounter. PROBLEM LIST    Patient Active Problem List   Diagnosis    PAF (paroxysmal atrial fibrillation) (HCC)    Radial nerve palsy    Compression neuropathy of upper extremity    Type 2 diabetes mellitus without complication, without long-term current use of insulin (HCC)    Essential hypertension    Disorder of autonomic nervous system    BPN (brachial plexus neuropathy)    Cervical nerve root disorder    Disorder of a single nerve    Brachial plexopathy    [facility]ot[facility] artery disease [facility]    Dyslipidemia    S/P ablation of atrial fibrillation    Hyperlipidemia    Hypertension    Atrial fibrillation ([address]    History of loop recorder    Episode of recurrent major depressive disorder ([facility]    WD-Dog bite of toe left 2nd toe    WD-Open wound of left great toe due to dog bite       REVIEW OF SYSTEMS          Objective:      BP (!) 144/74   Pulse 83   Temp 98.3 °F (36.8 °C) (Temporal)   Resp 20   Ht 5' 11\" (1.803 m)   Wt 238 lb (108 kg)   BMI 33.19 kg/m²     PHYSICAL EXAM  Constitutional: Negative for systemic symptoms including fever, chills and malaise.     Dermatologic exam: Visual inspection of the periwound reveals the skin to be normal in turgor and texture  Wound exam: see wound description below in procedure note      Assessment:       Brayan Hernandes appears to have a non-healing wound of the left great toe and 2nd toe. The etiology of the wound is felt to be traumatic. There are multiple complicating factors including diabetes, obesity and anticoagulation therapy. A comprehensive wound management program would be helpful to heal this wound. Assessments completed include fall risk and nutritional, functional,and psychological status. At this time appropriate care would include: periodic debridement and wound care as below. Problem List Items Addressed This Visit     WD-Dog bite of toe left 2nd toe    WD-Open wound of left great toe due to dog bite            Procedure Note    Indications:  Based on my examination of this patient's wound(s) today, sharp excision into necrotic bone is required to promote healing and evaluate the extent of previous healing. Performed by: Mati Stokes MD    Consent obtained: Yes    Time out taken:  Yes    Pain Control: none needed       Debridement:Excisional Debridement    Using curette and rongeur the wound(s) was/were sharply debrided down through and including the removal of bone. Devitalized Tissue Debrided:  fibrin, biofilm, slough, necrotic/eschar and exudate    Pre Debridement Measurements:  Are located in the Wound Documentation Flow Sheet    All active wounds listed below with today's date are evaluated  Wound(s)    debrided this date include # : 1 and 2     Post  Debridement Measurements:  Wound 09/17/20 Toe (Comment  which one) Anterior; Left #1 GR TOE (Active)   Wound Image   09/17/20 0849   Wound Length (cm) 2 cm 09/17/20 0849   Wound Width (cm) 3 cm 09/17/20 0849   Wound Depth (cm) 0.1 cm 09/17/20 0849   Wound Surface Area (cm^2) 6 cm^2 09/17/20 0849   Wound Volume (cm^3) 0.6 cm^3 09/17/20 0849   Post-Procedure Length (cm) 2 cm 09/17/20 0903   Post-Procedure Width (cm) 3 cm 09/17/20 0903   Post-Procedure Depth (cm) 0.1 cm 09/17/20 0903   Post-Procedure Surface Area (cm^2) 6 cm^2 09/17/20 1951 Post-Procedure Volume (cm^3) 0.6 cm^3 20 0903   Exposed structure Bone 20 0903   Number of days: 0       Wound 20 Toe (Comment  which one) Left #2    2ND  (Active)   Wound Image   20 0849   Wound Length (cm) 1.5 cm 20 0849   Wound Width (cm) 2 cm 20 0849   Wound Depth (cm) 0.1 cm 20 0849   Wound Surface Area (cm^2) 3 cm^2 20 0849   Wound Volume (cm^3) 0.3 cm^3 20 0849   Post-Procedure Length (cm) 1.5 cm 20 0903   Post-Procedure Width (cm) 2 cm 20 0903   Post-Procedure Depth (cm) 0.1 cm 20 0903   Post-Procedure Surface Area (cm^2) 3 cm^2 20 0903   Post-Procedure Volume (cm^3) 0.3 cm^3 20 4773   Number of days: 0       Percent of Wound(s) Debrided: 100%    Total  Area  Debrided:  9.0 sq cm     Bleeding:  Minimal    Hemostasis Achieved:  by pressure    Procedural Pain:  0  / 10     Post Procedural Pain:  0 / 10     Response to treatment:  Well tolerated by patient. Plan:     Discharge Instructions         PHYSICIAN ORDERS AND DISCHARGE INSTRUCTIONS    NOTE: Upon discharge from the [address], you will receive a patient experience survey. We would be grateful if you would take the time to fill this survey out. Wound care order history:                   Initial Visit: 20  ELOINA's   Right       Left    Date:   Imagin/10/20  Amputation of the 2nd digit at the level of the middle phalanx with associated soft tissue swelling     Cultures:  SENT ON 20   Antibiotics: amoxicillin-clavulanate (AUGMENTIN) 875-125 MG for 7 days           Grafts:     Continuing wound care orders and information:                Residence:                Continue home health care with:    Your wound-care supplies will be provided by: Wound cleansing:     Do not scrub or use excessive force. Wash hands with soap and water before and after dressing changes.    Prior to applying a clean dressing, cleanse wound with normal saline, wound cleanser, or mild soap and water. Ask the physician or nurse before getting the wound(s) wet in a shower    Daily Wound management:   Keep weight off wounds and reposition every 2 hours. Avoid standing for long periods of time. Apply wraps/stockings in AM and remove at bedtime. If swelling is present, elevate legs to the level of the heart or above for 30 minutes 4-5 times a day and/or when sitting. When taking antibiotics take entire prescription as ordered by physician do not stop taking until medicine is all gone. Orders for this week:     9/17/20    Big Toe and Second Toe of Left Foot :Wash with soap and water. Pat dry. Paint with betadine   Wrap with conform  Secure With tape     Change Daily    Follow up with Dr Mundo Dean In  1 weeks in the wound care center  Call [phone] for any questions or concerns.   Date__________   Time____________        Treatment Note      Written Patient Dismissal Instructions Given          Electronically signed by Feliz Farrell MD on 9/17/2020 at 9:23 AM

## 2020-09-20 ASSESSMENT — ENCOUNTER SYMPTOMS
SORE THROAT: 0
ABDOMINAL DISTENTION: 0
EYE REDNESS: 0
NAUSEA: 0
COLOR CHANGE: 0
DIARRHEA: 0
EYE DISCHARGE: 0
CONSTIPATION: 0
CHEST TIGHTNESS: 0
SHORTNESS OF BREATH: 0
ABDOMINAL PAIN: 0
VOMITING: 0

## 2020-09-22 LAB
CULTURE: ABNORMAL
CULTURE: ABNORMAL
Lab: ABNORMAL
SPECIMEN: ABNORMAL

## 2020-09-22 RX ORDER — DOXYCYCLINE HYCLATE 100 MG
100 TABLET ORAL 2 TIMES DAILY
Qty: 14 TABLET | Refills: 0 | Status: SHIPPED | OUTPATIENT
Start: 2020-09-22 | End: 2020-09-29

## 2020-09-23 ENCOUNTER — TELEPHONE (OUTPATIENT)
Dept: FAMILY MEDICINE CLINIC | Age: 65
End: 2020-09-23

## 2020-09-23 NOTE — TELEPHONE ENCOUNTER
Patient would like to know if his Cologard Report has come back yet ----he did the test a couple of months ago.

## 2020-09-23 NOTE — RESULT ENCOUNTER NOTE
Please notify Kalpana Santos that his wound culture grew MRSA. Bases on the susceptibility profile, I have sent a prescription for doxycycline to his pharmacy. He can follow up as scheduled. Thanks!

## 2020-09-24 ENCOUNTER — HOSPITAL ENCOUNTER (OUTPATIENT)
Dept: WOUND CARE | Age: 65
Discharge: HOME | End: 2020-09-24
Payer: MEDICARE

## 2020-09-24 VITALS — RESPIRATION RATE: 18 BRPM | DIASTOLIC BLOOD PRESSURE: 72 MMHG | SYSTOLIC BLOOD PRESSURE: 150 MMHG | HEART RATE: 84 BPM

## 2020-09-24 PROCEDURE — 11044 DBRDMT BONE 1ST 20 SQ CM/<: CPT

## 2020-09-24 RX ORDER — LIDOCAINE 50 MG/G
OINTMENT TOPICAL PRN
Status: DISCONTINUED | OUTPATIENT
Start: 2020-09-24 | End: 2020-09-25 | Stop reason: HOSPADM

## 2020-09-24 RX ORDER — SULFAMETHOXAZOLE AND TRIMETHOPRIM 800; 160 MG/1; MG/1
1 TABLET ORAL 2 TIMES DAILY
Qty: 20 TABLET | Refills: 0 | Status: SHIPPED | OUTPATIENT
Start: 2020-09-24 | End: 2020-10-04

## 2020-09-24 NOTE — PROGRESS NOTES
Wound Care Center Progress Note with Procedure Note      Eduard Norton  AGE: 77 y.o. GENDER: male  : 1955  EPISODE DATE:  2020     Subjective:     Chief Complaint   Patient presents with    Wound Check     left foot          HISTORY of PRESENT ILLNESS      Sydney Corona is a 77 y.o. male who presents today for wound evaluation of Acute traumatic wound(s) of toes left, great toe, 2nd toe. The wound is of marked severity. The underlying cause of the wound is dog bite. Wound Pain Timing/Severity: none  Quality of pain: N/A  Severity of pain:  0 / 10   Modifying Factors: neuropathy  Associated Signs/Symptoms: none        PAST MEDICAL HISTORY        Diagnosis Date    Arrhythmia     Atrial fibrillation ([address]     Carotid Doppler 2018    Mild (0-49%) disease of the Bilateral Internal carotid artery. No hemodynamically significant stenosis noted. Normal vertebral flow.  Diabetes mellitus ([address]     H/O echocardiogram 2020    EF 50%. Sclerotic aortic valve with mild stenosis. 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 No reversible myocardial ischemia seen. Uneventful pharmacological     History of echocardiogram 2017    Ejection fraction is visually estimated at 55%. mildly Increased LVOT velocity. Aortic valve leaflets are somewhat thickened. Aortic valve appears tricuspid. Trivial aortic regurgitation is noted. Mitral annular calcification is present. Mild calcification of the anterior leaflet of the mitral valve.     Hyperlipidemia     Hypertension     WD-Dog bite of toe left 2nd toe 2020    WD-Open wound of left great toe due to dog bite 2020       PAST SURGICAL HISTORY    Past Surgical History:   Procedure Laterality Date    OTHER SURGICAL HISTORY  2018    Atrial Fib cardiac Ablation       FAMILY HISTORY    Family History Problem Relation Age of Onset    Heart Disease Father        SOCIAL HISTORY    Social History     Tobacco Use    Smoking status: Never Smoker    Smokeless tobacco: Never Used   Substance Use Topics    Alcohol use: Yes     Comment: caffeine 2 cups of coffee a day    Drug use: No       ALLERGIES    Allergies   Allergen Reactions    Morphine Rash       MEDICATIONS    Current Outpatient Medications on File Prior to Encounter   Medication Sig Dispense Refill    doxycycline hyclate (VIBRA-TABS) 100 MG tablet Take 1 tablet by mouth 2 times daily for 7 days 14 tablet 0    CARTIA  MG extended release capsule Take 2 capsules by mouth daily 180 capsule 1    rivaroxaban (XARELTO) 20 MG TABS tablet Take 1 tablet by mouth daily (with breakfast) 14 tablet 0    lisinopril (PRINIVIL;ZESTRIL) 40 MG tablet TAKE ONE TABLET BY MOUTH DAILY 90 tablet 0    metFORMIN (GLUCOPHAGE-XR) 500 MG extended release tablet TAKE FOUR TABLETS BY MOUTH DAILY WITH BREAKFAST 360 tablet 0    levothyroxine (SYNTHROID) 100 MCG tablet TAKE ONE TABLET BY MOUTH EVERY EVENING 90 tablet 0    hydroCHLOROthiazide (HYDRODIURIL) 25 MG tablet TAKE ONE TABLET BY MOUTH EVERY EVENING 90 tablet 1    glimepiride (AMARYL) 4 MG tablet TAKE ONE TABLET BY MOUTH TWICE A DAY WITH MEALS 180 tablet 1    PARoxetine (PAXIL) 30 MG tablet TAKE ONE TABLET BY MOUTH EVERY MORNING 30 tablet 5    celecoxib (CELEBREX) 200 MG capsule Take 1 capsule by mouth daily 90 capsule 1    atorvastatin (LIPITOR) 40 MG tablet Take 1 tablet by mouth daily 90 tablet 1    insulin detemir (LEVEMIR FLEXTOUCH) 100 UNIT/ML injection pen Inject 10 Units into the skin nightly 5 pen 3    insulin detemir (LEVEMIR) 100 UNIT/ML injection vial Inject 10 Units into the skin nightly 3 vial 1    gabapentin (NEURONTIN) 300 MG capsule       tiZANidine (ZANAFLEX) 2 MG tablet Take 1 tablet by mouth 3 times daily as needed (muscle spasm) 30 tablet 0    aspirin 81 MG tablet Take 81 mg by mouth daily      terbinafine (LAMISIL) 1 % cream Apply topically 2 times daily Apply topically 2 times daily.  Blood Glucose Monitoring Suppl (ONETOUCH VERIO IQ SYSTEM) W/DEVICE KIT       ONETOUCH VERIO strip       BD PEN NEEDLE RADU U/F 32G X 4 MM MISC       ONETOUCH DELICA LANCETS FINE MISC       pioglitazone (ACTOS) 15 MG tablet TAKE ONE TABLET BY MOUTH EVERY EVENING 30 tablet 0     No current facility-administered medications on file prior to encounter. REVIEW OF SYSTEMS      Constitutional: Negative for systemic symptoms including fever, chills and malaise. Objective:      BP (!) 150/72   Pulse 84   Resp 18     PHYSICAL EXAM  Constitutional: Negative for systemic symptoms including fever, chills and malaise. General: The patient is in no acute distress. Mental status:  Patient is appropriate, is  oriented to place and plan of care. Dermatologic exam: Visual inspection of the periwound reveals the skin to be normal in turgor and texture  Wound exam: see wound description below in procedure note      Assessment:     Problem List Items Addressed This Visit     WD-Dog bite of toe left 2nd toe - Primary    WD-Open wound of left great toe due to dog bite        Procedure Note    Indications:  Based on my examination of this patient's wound(s) today, sharp excision into necrotic bone is required to promote healing and evaluate the extent of previous healing. Performed by: Valeria Danielle MD    Consent obtained: Yes    Time out taken:  Yes    Pain Control: none       Debridement:Excisional Debridement    Using curette and scissors the wound(s) was/were sharply debrided down through and including the removal of bone.         Devitalized Tissue Debrided:  biofilm, slough and necrotic/eschar    Pre Debridement Measurements:  Are located in the Wound Documentation Flow Sheet    All active wounds listed below with today's date are evaluated  Wound(s)    debrided this date include # : 1 and 2 Post  Debridement Measurements:  Wound 09/17/20 Toe (Comment  which one) Anterior; Left #1 GR TOE (Active)   Wound Image   09/17/20 0849   Dressing Status Clean;Dry; Intact 09/17/20 1001   Dressing Changed Changed/New 09/17/20 1001   Wound Cleansed Rinsed/Irrigated with saline 09/24/20 0839   Wound Length (cm) 2 cm 09/24/20 0839   Wound Width (cm) 2 cm 09/24/20 0839   Wound Depth (cm) 0.1 cm 09/24/20 0839   Wound Surface Area (cm^2) 4 cm^2 09/24/20 0839   Change in Wound Size % (l*w) 33.33 09/24/20 0839   Wound Volume (cm^3) 0.4 cm^3 09/24/20 0839   Wound Healing % 33 09/24/20 0839   Post-Procedure Length (cm) 2 cm 09/17/20 0903   Post-Procedure Width (cm) 3 cm 09/17/20 0903   Post-Procedure Depth (cm) 0.1 cm 09/17/20 0903   Post-Procedure Surface Area (cm^2) 6 cm^2 09/17/20 0903   Post-Procedure Volume (cm^3) 0.6 cm^3 09/17/20 0903   Distance Tunneling (cm) 0 cm 09/24/20 0839   Tunneling Position ___ O'Clock 0 09/24/20 0839   Undermining Starts ___ O'Clock 0 09/24/20 0839   Undermining Ends___ O'Clock 0 09/24/20 0839   Undermining Maxium Distance (cm) 0 09/24/20 0839   Exposed structure Bone 09/17/20 0903   Number of days: 7       Wound 09/17/20 Toe (Comment  which one) Left #2    2ND  (Active)   Wound Image   09/17/20 0849   Dressing Status Clean;Dry; Intact 09/17/20 1001   Dressing Changed Changed/New 09/17/20 1001   Wound Cleansed Rinsed/Irrigated with saline 09/24/20 0839   Wound Length (cm) 0.7 cm 09/24/20 0839   Wound Width (cm) 1.5 cm 09/24/20 0839   Wound Depth (cm) 0.1 cm 09/24/20 0839   Wound Surface Area (cm^2) 1.05 cm^2 09/24/20 0839   Change in Wound Size % (l*w) 65 09/24/20 0839   Wound Volume (cm^3) 0.1 cm^3 09/24/20 0839   Wound Healing % 67 09/24/20 0839   Post-Procedure Length (cm) 1.5 cm 09/17/20 0903   Post-Procedure Width (cm) 2 cm 09/17/20 0903   Post-Procedure Depth (cm) 0.1 cm 09/17/20 0903   Post-Procedure Surface Area (cm^2) 3 cm^2 09/17/20 0903   Post-Procedure Volume (cm^3) 0.3 cm^3 20 0903   Distance Tunneling (cm) 0 cm 20 0839   Tunneling Position ___ O'Clock 0 20 0839   Undermining Starts ___ O'Clock 0 20 0839   Undermining Ends___ O'Clock 0 20 0839   Undermining Maxium Distance (cm) 0 20 0839   Number of days: 7       Percent of Wound(s) Debrided: approximately 100%    Total Surface Area Debrided:  5.5 sq cm     Bleeding:  None    Hemostasis Achieved:  not needed    Procedural Pain:  0  / 10     Post Procedural Pain:  0 / 10     Response to treatment:  Well tolerated by patient. Wound is has slightly improved    Plan:       Discharge Instructions         PHYSICIAN ORDERS AND DISCHARGE INSTRUCTIONS     NOTE: Upon discharge from the [address], you will receive a patient experience survey. We would be grateful if you would take the time to fill this survey out.     Wound care order history:                               Initial Visit: 20  ELOINA's   Right       Left               Date:              Imagin/10/20  Amputation of the 2nd digit at the level of the middle phalanx with associated soft tissue swelling      Cultures:  SENT ON 20              Antibiotics: amoxicillin-clavulanate (AUGMENTIN) 875-125 MG for 7 days                        Grafts:      Continuing wound care orders and information:                 Residence:                Continue home health care with:               Your wound-care supplies will be provided by:      Wound cleansing:               Do not scrub or use excessive force. Wash hands with soap and water before and after dressing changes. Prior to applying a clean dressing, cleanse wound with normal saline, wound cleanser, or mild soap and water. Ask the physician or nurse before getting the wound(s) wet in a shower     Daily Wound management:              Keep weight off wounds and reposition every 2 hours. Avoid standing for long periods of time. Apply wraps/stockings in AM and remove at bedtime. If swelling is present, elevate legs to the level of the heart or above for 30 minutes 4-5 times a day and/or when sitting. When taking antibiotics take entire prescription as ordered by physician do not stop taking until medicine is all gone.                                                           Orders for this week:     9/17/20     Big Toe and Second Toe of Left Foot :Wash with soap and water. Pat dry. Paint with betadine   Wrap with conform  Secure With tape      Change Daily     Follow up with Dr Nora Robertson In  1 weeks in the wound care center  Call [phone] for any questions or concerns.   Date__________   Time____________          Treatment Note      Written Patient Dismissal Instructions Given            Electronically signed by Philip Lang MD on 9/24/2020 at 9:03 AM

## 2020-09-30 ENCOUNTER — TELEMEDICINE (OUTPATIENT)
Dept: CARDIOLOGY CLINIC | Age: 65
End: 2020-09-30
Payer: MEDICARE

## 2020-09-30 PROCEDURE — 1123F ACP DISCUSS/DSCN MKR DOCD: CPT | Performed by: INTERNAL MEDICINE

## 2020-09-30 PROCEDURE — 2022F DILAT RTA XM EVC RTNOPTHY: CPT | Performed by: INTERNAL MEDICINE

## 2020-09-30 PROCEDURE — 3046F HEMOGLOBIN A1C LEVEL >9.0%: CPT | Performed by: INTERNAL MEDICINE

## 2020-09-30 PROCEDURE — 3017F COLORECTAL CA SCREEN DOC REV: CPT | Performed by: INTERNAL MEDICINE

## 2020-09-30 PROCEDURE — 4040F PNEUMOC VAC/ADMIN/RCVD: CPT | Performed by: INTERNAL MEDICINE

## 2020-09-30 PROCEDURE — G8427 DOCREV CUR MEDS BY ELIG CLIN: HCPCS | Performed by: INTERNAL MEDICINE

## 2020-09-30 PROCEDURE — 99213 OFFICE O/P EST LOW 20 MIN: CPT | Performed by: INTERNAL MEDICINE

## 2020-09-30 NOTE — LETTER
[address]  [address] [address]  Phone: [phone]  Fax: [phone]    Griffin Varner MD        September 30, 2020     Alex Parks MD  [place] [address]    Patient: Rosas Vargas  MR Number: [id number]  YOB: 1955  Date of Visit: 9/30/2020    Dear Dr. Alex Parks:    Thank you for the request for consultation for Gracie Munoz to me for the evaluation of PAF. Below are the relevant portions of my assessment and plan of care. If you have questions, please do not hesitate to call me. I look forward to following Climmie Quails along with you.     Sincerely,        Griffin Varner MD

## 2020-09-30 NOTE — PATIENT INSTRUCTIONS
CAD:No  HTN:well controlled on current medical regimen, see list above. - changes in  treatment:   no   CARDIOMYOPATHY: None known   CONGESTIVE HEART FAILURE: NO KNOWN HISTORY.      VHD: No significant VHD noted  DYSLIPIDEMIA: Patient's profile is at / near Goal.yes,                                 HDL is low                                Tolerating current medical regimen wellyes,                                                             See most recent Lab values in Labs section above. Diabetes mellitis: .yes,                                      Managed by family MD                                     BS under good control yes,                                      Hgb A1c avilable yes,   CAROTID ARTERY DISEASE:.yes, mild               No symptoms described pertaining to Carotid artery disease               Up to date on non invasive testing .yes,                Patient is on Guidelines recommended therapy. yes,   OTHER RELEVANT DIAGNOSIS:as noted in patient's active problem list:  TESTS ORDERED: None this visit                                      All previously ordered tests reviewed. Echo has mild AS only. ARRHYTHMIAS: known                                Patient has H/O Atrial fibrillation S/P Ablation                                He is rate controlled & on anticoagulation. Patient is in NSR . MEDICATIONS: CPM   Office f/u in six months. Primary/secondary prevention is the goal by aggressive risk modification, healthy and therapeutic life style changes for cardiovascular risk reduction. Various goals are discussed and multiple questions answered.

## 2020-09-30 NOTE — PROGRESS NOTES
OFFICE PROGRESS NOTES      Cara Escobar is a 77 y.o. male who has    CHIEF COMPLAINT AS FOLLOWS:  CHEST PAIN: Patient denies any C/O chest pains at this time. SOB:  C/O SOB with exertion. LEG EDEMA: No leg edema   PALPITATIONS: Denies any C/O Palpitations                                  DIZZINESS: No C/O Dizziness   SYNCOPE: None   OTHER:                                     HPI: Patient is here for F/U on his A-FIB, CAD, HTN & Dyslipidemia problems. He does not have any complaints at this time.     Anjelica Berman has the following history recorded in care path:  Patient Active Problem List    Diagnosis Date Noted    S/P ablation of atrial fibrillation 08/22/2018     Priority: High    Type 2 diabetes mellitus without complication, without long-term current use of insulin ([facility]      Priority: High    Essential hypertension      Priority: High    Compression neuropathy of upper extremity      Priority: High    PAF (paroxysmal atrial fibrillation) ([facility]      Priority: High    Radial nerve palsy 01/30/2017     Priority: Low    WD-Dog bite of toe left 2nd toe 09/17/2020    WD-Open wound of left great toe due to dog bite 09/17/2020    Episode of recurrent major depressive disorder ([facility] 06/18/2020    History of loop recorder 11/18/2019    Hyperlipidemia 04/26/2019    Hypertension     Atrial fibrillation ([facility]     Carotid artery disease ([facility] 03/08/2018    Dyslipidemia 03/08/2018    Brachial plexopathy 05/22/2017    Disorder of autonomic nervous system 03/20/2017    BPN (brachial plexus neuropathy) 03/20/2017    Cervical nerve root disorder 03/20/2017    Disorder of a single nerve 03/20/2017     Current Outpatient Medications   Medication Sig Dispense Refill    sulfamethoxazole-trimethoprim (BACTRIM DS;SEPTRA DS) 800-160 MG per tablet Take 1 tablet by mouth 2 times daily for 10 days 20 tablet 0    CARTIA  MG extended release capsule Take 2 capsules by mouth daily 180 capsule 1    rivaroxaban (XARELTO) 20 MG TABS tablet Take 1 tablet by mouth daily (with breakfast) 14 tablet 0    lisinopril (PRINIVIL;ZESTRIL) 40 MG tablet TAKE ONE TABLET BY MOUTH DAILY 90 tablet 0    metFORMIN (GLUCOPHAGE-XR) 500 MG extended release tablet TAKE FOUR TABLETS BY MOUTH DAILY WITH BREAKFAST 360 tablet 0    levothyroxine (SYNTHROID) 100 MCG tablet TAKE ONE TABLET BY MOUTH EVERY EVENING 90 tablet 0    hydroCHLOROthiazide (HYDRODIURIL) 25 MG tablet TAKE ONE TABLET BY MOUTH EVERY EVENING 90 tablet 1    glimepiride (AMARYL) 4 MG tablet TAKE ONE TABLET BY MOUTH TWICE A DAY WITH MEALS 180 tablet 1    PARoxetine (PAXIL) 30 MG tablet TAKE ONE TABLET BY MOUTH EVERY MORNING 30 tablet 5    celecoxib (CELEBREX) 200 MG capsule Take 1 capsule by mouth daily 90 capsule 1    atorvastatin (LIPITOR) 40 MG tablet Take 1 tablet by mouth daily 90 tablet 1    pioglitazone (ACTOS) 15 MG tablet TAKE ONE TABLET BY MOUTH EVERY EVENING 30 tablet 0    insulin detemir (LEVEMIR FLEXTOUCH) 100 UNIT/ML injection pen Inject 10 Units into the skin nightly 5 pen 3    insulin detemir (LEVEMIR) 100 UNIT/ML injection vial Inject 10 Units into the skin nightly 3 vial 1    gabapentin (NEURONTIN) 300 MG capsule       tiZANidine (ZANAFLEX) 2 MG tablet Take 1 tablet by mouth 3 times daily as needed (muscle spasm) 30 tablet 0    aspirin 81 MG tablet Take 81 mg by mouth daily      terbinafine (LAMISIL) 1 % cream Apply topically 2 times daily Apply topically 2 times daily.  Blood Glucose Monitoring Suppl ([employer] SYSTEM) W/DEVICE KIT       ONETOUCH VERIO strip       BD PEN NEEDLE RADU U/F 32G X 4 MM MISC       ONETOUCH DELICA LANCETS FINE MISC        No current facility-administered medications for this visit.       Allergies: Morphine  Past Medical History:   Diagnosis Date    Arrhythmia     Atrial fibrillation ([facility]     Carotid Doppler 03/21/2018    Mild (0-49%) disease of the Bilateral Internal carotid artery. No hemodynamically significant stenosis noted. Normal vertebral flow.  Diabetes mellitus ([address]     H/O echocardiogram 08/04/2020    EF 50%. Sclerotic aortic valve with mild stenosis. 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 No reversible myocardial ischemia seen. Uneventful pharmacological     History of echocardiogram 01/30/2017    Ejection fraction is visually estimated at 55%. mildly Increased LVOT velocity. Aortic valve leaflets are somewhat thickened. Aortic valve appears tricuspid. Trivial aortic regurgitation is noted. Mitral annular calcification is present. Mild calcification of the anterior leaflet of the mitral valve.  Hyperlipidemia     Hypertension     WD-Dog bite of toe left 2nd toe 9/17/2020    WD-Open wound of left great toe due to dog bite 9/17/2020     Past Surgical History:   Procedure Laterality Date    OTHER SURGICAL HISTORY  08/22/2018    Atrial Fib cardiac Ablation      As reviewed   Family History   Problem Relation Age of Onset    Heart Disease Father      Social History     Tobacco Use    Smoking status: Never Smoker    Smokeless tobacco: Never Used   Substance Use Topics    Alcohol use: Yes     Comment: caffeine 2 cups of coffee a day      Review of Systems:    Constitutional: Negative for diaphoresis and fatigue  Psychological:Negative for anxiety or depression  HENT: Negative for headaches, nasal congestion, sinus pain or vertigo  Eyes: Negative for visual disturbance.    Endocrine: Negative for polydipsia/polyuria  Respiratory: Negative for shortness of breath  Cardiovascular: Negative for chest pain, dyspnea on exertion, claudication, edema, irregular heartbeat, murmur, palpitations or shortness of breath  Gastrointestinal: Negative for abdominal pain or heartburn  Genito-Urinary: Negative for urinary CHOL 143 04/23/2018    TRIG 76 08/13/2019    TRIG 81 04/23/2018    TRIG 81 04/23/2018    HDL 52 08/13/2019    HDL 58 04/23/2018    HDL 58 04/23/2018    LDLCALC 67 08/13/2019    LDLCALC 69 04/23/2018    LDLCALC 69 04/23/2018    LDLDIRECT 137 (H) 07/20/2011     Lab Results   Component Value Date    ALT 22 08/13/2019    ALT 44 04/23/2018    ALT 44 04/23/2018    AST 29 08/13/2019    AST 41 04/23/2018    AST 41 04/23/2018     BMP:    Lab Results   Component Value Date     08/13/2019     08/23/2018    K 4.9 08/13/2019    K 4.1 08/23/2018    CL 94 08/13/2019     08/23/2018    CO2 29 08/13/2019    CO2 27 08/23/2018    BUN 30 08/13/2019    BUN 19 08/23/2018    CREATININE 1.0 08/13/2019    CREATININE 0.9 08/23/2018     CMP:   Lab Results   Component Value Date     08/13/2019     08/23/2018    K 4.9 08/13/2019    K 4.1 08/23/2018    CL 94 08/13/2019     08/23/2018    CO2 29 08/13/2019    CO2 27 08/23/2018    BUN 30 08/13/2019    BUN 19 08/23/2018    CREATININE 1.0 08/13/2019    CREATININE 0.9 08/23/2018    PROT 7.2 08/13/2019    PROT 7.4 01/28/2017    PROT 7.6 07/30/2011    PROT 6.6 07/20/2011     TSH:    Lab Results   Component Value Date    TSHHS 2.880 07/31/2011       QUALITY MEASURES REVIEWED:  1.CAD:Patient is taking anti platelet agent:Yes  2. DYSLIPIDEMIA: Patient is on cholesterol lowering medication:Yes  3. Beta-Blocker therapy for CAD, if prior Myocardial Infarction:No  4. Atrial fibrillation & anticoagulation therapy Yes  5. Discussed weight management strategies. Impression:    1. Essential hypertension    2. PAF (paroxysmal atrial fibrillation) ([facility] [facility]    3. Type 2 diabetes mellitus without complication, without long-term current use of insulin ([facility] [address]    4. S/P ablation of atrial fibrillation    5. Bilateral carotid artery stenosis    6.  Dyslipidemia       Patient Active Problem List   Diagnosis Code    [facility]oxysmal [facility] fibrillation) ([facility]) I48.0    Radial nerve palsy G56.30    Compression neuropathy of upper extremity G56.90    Type 2 diabetes mellitus without complication, without long-term current use of insulin (HCC) E11.9    Essential hypertension I10    Disorder of autonomic nervous system G90.9    BPN (brachial plexus neuropathy) G54.0    Cervical nerve root disorder M54.12    Disorder of a single nerve G58.9    Brachial plexopathy G54.0    Carotid artery disease (HCC) I73.9    Dyslipidemia E78.5    S/P ablation of atrial fibrillation Z98.890, Z86.79    Hyperlipidemia E78.5    Hypertension I10    Atrial fibrillation (HCC) I48.91    History of loop recorder Z98.890    Episode of recurrent major depressive disorder (HCC) F33.9    WD-Dog bite of toe left 2nd toe S91.159A, W54. 0XXA    WD-Open wound of left great toe due to dog bite S91.152A, W54. 0XXA       Assessment & Plan:    being evaluated by a Virtual Visit (video visit) encounter to address concerns as mentioned above. A caregiver was present when appropriate. Due to this being a TeleHealth encounter (During TCOMT-55 public health emergency), evaluation of the following organ systems was limited: Vitals/Constitutional/EENT/Resp/CV/GI//MS/Neuro/Skin/Heme-Lymph-Imm. Pursuant to the emergency declaration under the [address] [place] [address], [address] [place] [address] and the [employer] Act, this Virtual Visit was conducted with patient's (and/or legal guardian's) consent, to reduce the patient's risk of exposure to COVID-19 and provide necessary medical care. The patient (and/or legal guardian) has also been advised to contact this office for worsening conditions or problems, and seek emergency medical treatment and/or call 911 if deemed necessary. Time spent during this visit 12 min      CAD:No  HTN:well controlled on current medical regimen, see list above.              - changes in  treatment:   no   CARDIOMYOPATHY: None known   CONGESTIVE HEART FAILURE: NO KNOWN HISTORY.      VHD: No significant VHD noted  DYSLIPIDEMIA: Patient's profile is at / near Goal.yes,                                 HDL is low                                Tolerating current medical regimen wellyes,                                                             See most recent Lab values in Labs section above. Diabetes mellitis: .yes,                                      Managed by family MD                                     BS under good control yes,                                      Hgb A1c avilable yes,   CAROTID ARTERY DISEASE:.yes, mild               No symptoms described pertaining to Carotid artery disease               Up to date on non invasive testing .yes,                Patient is on Guidelines recommended therapy. yes,   OTHER RELEVANT DIAGNOSIS:as noted in patient's active problem list:  TESTS ORDERED: None this visit                                      All previously ordered tests reviewed. Echo has mild AS only. ARRHYTHMIAS: known                                Patient has H/O Atrial fibrillation S/P Ablation                                He is rate controlled & on anticoagulation. Patient is in NSR . MEDICATIONS: CPM   Office f/u in six months. Primary/secondary prevention is the goal by aggressive risk modification, healthy and therapeutic life style changes for cardiovascular risk reduction. Various goals are discussed and multiple questions answered.

## 2020-09-30 NOTE — LETTER
Lauren Armijo  1955  [id number]    Have you had any Chest Pain - No      Have you had any Shortness of Breath - Yes  If Yes - When on exertion    Have you had any dizziness - No    Have you had any palpitations - No    Do you have any edema - No    Do you have a surgery or procedure scheduled in the near future - No

## 2020-10-01 ENCOUNTER — HOSPITAL ENCOUNTER (OUTPATIENT)
Dept: WOUND CARE | Age: 65
Discharge: HOME | End: 2020-10-01
Payer: MEDICARE

## 2020-10-01 VITALS
HEART RATE: 80 BPM | TEMPERATURE: 98.7 F | SYSTOLIC BLOOD PRESSURE: 136 MMHG | RESPIRATION RATE: 18 BRPM | DIASTOLIC BLOOD PRESSURE: 80 MMHG

## 2020-10-01 PROCEDURE — 11044 DBRDMT BONE 1ST 20 SQ CM/<: CPT

## 2020-10-01 NOTE — PROGRESS NOTES
Wound Care Center Progress Note with Procedure Note      Kim Norton  AGE: 77 y.o. GENDER: male  : 1955  EPISODE DATE:  10/1/2020     Subjective:     Chief Complaint   Patient presents with    Wound Check     left foot          HISTORY of PRESENT ILLNESS      An Briscoe is a 77 y.o. male who presents today for wound evaluation of Chronic traumatic wound(s) of toes left, great toe, 2nd toe. The wound is of moderate severity. The underlying cause of the wound is dog bite. Wound Pain Timing/Severity: none  Quality of pain: N/A  Severity of pain:  0 / 10   Modifying Factors: diabetes, obesity and anticoagulation therapy  Associated Signs/Symptoms: none        PAST MEDICAL HISTORY        Diagnosis Date    Arrhythmia     Atrial fibrillation ([address]     Carotid Doppler 2018    Mild (0-49%) disease of the Bilateral Internal carotid artery. No hemodynamically significant stenosis noted. Normal vertebral flow.  Diabetes mellitus ([address]     H/O echocardiogram 2020    EF 50%. Sclerotic aortic valve with mild stenosis. 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 No reversible myocardial ischemia seen. Uneventful pharmacological     History of echocardiogram 2017    Ejection fraction is visually estimated at 55%. mildly Increased LVOT velocity. Aortic valve leaflets are somewhat thickened. Aortic valve appears tricuspid. Trivial aortic regurgitation is noted. Mitral annular calcification is present. Mild calcification of the anterior leaflet of the mitral valve.     Hyperlipidemia     Hypertension     WD-Dog bite of toe left 2nd toe 2020    WD-Open wound of left great toe due to dog bite 2020       PAST SURGICAL HISTORY    Past Surgical History:   Procedure Laterality Date    OTHER SURGICAL HISTORY  2018    Atrial Fib cardiac Ablation FAMILY HISTORY    Family History   Problem Relation Age of Onset    Heart Disease Father        SOCIAL HISTORY    Social History     Tobacco Use    Smoking status: Never Smoker    Smokeless tobacco: Never Used   Substance Use Topics    Alcohol use: Yes     Comment: caffeine 2 cups of coffee a day    Drug use: No       ALLERGIES    Allergies   Allergen Reactions    Morphine Rash       MEDICATIONS    Current Outpatient Medications on File Prior to Encounter   Medication Sig Dispense Refill    rivaroxaban (XARELTO) 20 MG TABS tablet Take 1 tablet by mouth daily (with breakfast) 30 tablet 5    sulfamethoxazole-trimethoprim (BACTRIM DS;SEPTRA DS) 800-160 MG per tablet Take 1 tablet by mouth 2 times daily for 10 days 20 tablet 0    CARTIA  MG extended release capsule Take 2 capsules by mouth daily 180 capsule 1    lisinopril (PRINIVIL;ZESTRIL) 40 MG tablet TAKE ONE TABLET BY MOUTH DAILY 90 tablet 0    metFORMIN (GLUCOPHAGE-XR) 500 MG extended release tablet TAKE FOUR TABLETS BY MOUTH DAILY WITH BREAKFAST 360 tablet 0    levothyroxine (SYNTHROID) 100 MCG tablet TAKE ONE TABLET BY MOUTH EVERY EVENING 90 tablet 0    hydroCHLOROthiazide (HYDRODIURIL) 25 MG tablet TAKE ONE TABLET BY MOUTH EVERY EVENING 90 tablet 1    glimepiride (AMARYL) 4 MG tablet TAKE ONE TABLET BY MOUTH TWICE A DAY WITH MEALS 180 tablet 1    PARoxetine (PAXIL) 30 MG tablet TAKE ONE TABLET BY MOUTH EVERY MORNING 30 tablet 5    celecoxib (CELEBREX) 200 MG capsule Take 1 capsule by mouth daily 90 capsule 1    atorvastatin (LIPITOR) 40 MG tablet Take 1 tablet by mouth daily 90 tablet 1    insulin detemir (LEVEMIR FLEXTOUCH) 100 UNIT/ML injection pen Inject 10 Units into the skin nightly 5 pen 3    insulin detemir (LEVEMIR) 100 UNIT/ML injection vial Inject 10 Units into the skin nightly 3 vial 1    gabapentin (NEURONTIN) 300 MG capsule       tiZANidine (ZANAFLEX) 2 MG tablet Take 1 tablet by mouth 3 times daily as needed (muscle spasm) 30 tablet 0    aspirin 81 MG tablet Take 81 mg by mouth daily      terbinafine (LAMISIL) 1 % cream Apply topically 2 times daily Apply topically 2 times daily.  Blood Glucose Monitoring Suppl (ONETOUCH VERIO IQ SYSTEM) W/DEVICE KIT       ONETOUCH VERIO strip       BD PEN NEEDLE RADU U/F 32G X 4 MM MISC       ONETOUCH DELICA LANCETS FINE MISC       pioglitazone (ACTOS) 15 MG tablet TAKE ONE TABLET BY MOUTH EVERY EVENING 30 tablet 0     No current facility-administered medications on file prior to encounter. REVIEW OF SYSTEMS      Constitutional: Negative for systemic symptoms including fever, chills and malaise. Objective:      /80   Pulse 80   Temp 98.7 °F (37.1 °C) (Temporal)   Resp 18     PHYSICAL EXAM  Constitutional: Negative for systemic symptoms including fever, chills and malaise. General: The patient is in no acute distress. Mental status:  Patient is appropriate, is  oriented to place and plan of care. Dermatologic exam: Visual inspection of the periwound reveals the skin to be normal in turgor and texture  Wound exam: see wound description below in procedure note      Assessment:     Problem List Items Addressed This Visit     WD-Dog bite of toe left 2nd toe - Primary    WD-Open wound of left great toe due to dog bite        Procedure Note    Indications:  Based on my examination of this patient's wound(s) today, sharp excision into necrotic bone is required to promote healing and evaluate the extent of previous healing. Performed by: Jose Juan Farooq MD    Consent obtained: Yes    Time out taken:  Yes    Pain Control: none       Debridement:Excisional Debridement    Using curette and rongeur the wound(s) was/were sharply debrided down through and including the removal of bone.         Devitalized Tissue Debrided:  biofilm, slough and necrotic/eschar    Pre Debridement Measurements:  Are located in the Wound Documentation Flow Sheet    All active wounds listed below with today's date are evaluated  Wound(s)    debrided this date include # : 2     Post  Debridement Measurements:  Wound 09/17/20 Toe (Comment  which one) Anterior; Left #1 GR TOE (Active)   Wound Image   09/17/20 0849   Dressing Status Clean;Dry; Intact 09/24/20 0911   Dressing Changed Changed/New 09/24/20 0911   Wound Cleansed Rinsed/Irrigated with saline 10/01/20 0817   Wound Length (cm) 1.5 cm 10/01/20 0817   Wound Width (cm) 1.5 cm 10/01/20 0817   Wound Depth (cm) 0.1 cm 10/01/20 0817   Wound Surface Area (cm^2) 2.25 cm^2 10/01/20 0817   Change in Wound Size % (l*w) 62.5 10/01/20 0817   Wound Volume (cm^3) 0.22 cm^3 10/01/20 0817   Wound Healing % 63 10/01/20 0817   Post-Procedure Length (cm) 2 cm 09/24/20 0908   Post-Procedure Width (cm) 2 cm 09/24/20 0908   Post-Procedure Depth (cm) 0.1 cm 09/24/20 0908   Post-Procedure Surface Area (cm^2) 4 cm^2 09/24/20 0908   Post-Procedure Volume (cm^3) 0.4 cm^3 09/24/20 0908   Distance Tunneling (cm) 0 cm 10/01/20 0817   Tunneling Position ___ O'Clock 0 10/01/20 0817   Undermining Starts ___ O'Clock 0 10/01/20 0817   Undermining Ends___ O'Clock 0 10/01/20 0817   Undermining [place] Distance (cm) 0 10/01/20 0817   Wound Assessment Brown;Red;Yellow 10/01/20 0817   Drainage Amount None 10/01/20 0817   Drainage Description Serosanguinous 09/24/20 0839   Odor None 10/01/20 0817   Margins Attached edges 10/01/20 0817   Exposed structure Bone 09/24/20 0839   [place]-wound Assessment Pink 10/01/20 0817   Non-staged Wound Description Not applicable 48/05/27 8189   [place]%Wound Bed 0 10/01/20 0817   Red%Wound Bed 30 10/01/20 0817   Yellow%Wound Bed 30 10/01/20 0817   Black%Wound Bed 0 10/01/20 0817   Purple%Wound Bed 0 10/01/20 0817   Other%Wound Bed 40 10/01/20 0817   Number of days: 14       Wound 09/17/20 Toe (Comment  which one) Left #2    2ND  (Active)   Wound Image   09/17/20 0849   Dressing Status Clean;Dry; Intact 09/24/20 0911   Dressing Changed Changed/New 09/24/20 8649   Wound Cleansed Rinsed/Irrigated with saline 10/01/20 0817   Wound Length (cm) 0.2 cm 10/01/20 0817   Wound Width (cm) 0.2 cm 10/01/20 0817   Wound Depth (cm) 0.1 cm 10/01/20 0817   Wound Surface Area (cm^2) 0.04 cm^2 10/01/20 0817   Change in Wound Size % (l*w) 98.67 10/01/20 0817   Wound Volume (cm^3) 0 cm^3 10/01/20 0817   Wound Healing % 100 10/01/20 0817   Post-Procedure Length (cm) 0.2 cm 10/01/20 0828   Post-Procedure Width (cm) 0.2 cm 10/01/20 0828   Post-Procedure Depth (cm) 0.1 cm 10/01/20 0828   Post-Procedure Surface Area (cm^2) 0.04 cm^2 10/01/20 0828   Post-Procedure Volume (cm^3) 0 cm^3 10/01/20 0828   Distance Tunneling (cm) 0 cm 10/01/20 0817   Tunneling Position ___ O'Clock 0 10/01/20 0817   Undermining Starts ___ O'Clock 0 10/01/20 0817   Undermining Ends___ O'Clock 0 10/01/20 0817   Undermining [place] Distance (cm) 0 10/01/20 0817   Wound Assessment Brown 10/01/20 0817   Drainage Amount None 10/01/20 0817   Drainage Description Serosanguinous 09/24/20 0839   Odor None 10/01/20 0817   Margins Attached edges 10/01/20 0817   Exposed structure Bone 09/24/20 0839   [place]-wound Assessment Dry 10/01/20 0817   Non-staged Wound Description Not applicable 07/54/27 4154   [place]%Wound Bed 0 10/01/20 0817   Red%Wound Bed 0 10/01/20 0817   Yellow%Wound Bed 0 10/01/20 0817   Black%Wound Bed 0 10/01/20 0817   Purple%Wound Bed 0 10/01/20 0817   Other%Wound Bed 100 brown  10/01/20 0817   Number of days: 14       Percent of Wound(s) Debrided: approximately 100%    Total Surface Area Debrided:  0.04 sq cm     Bleeding:  Minimal    Hemostasis Achieved:  by pressure    Procedural Pain:  0  / 10     Post Procedural Pain:  0 / 10     Response to treatment:  Well tolerated by patient. Wound is has moderately improved    Plan:       Discharge Instructions         PHYSICIAN ORDERS AND DISCHARGE INSTRUCTIONS     NOTE: Upon discharge from the [address], you will receive a patient experience survey.  We would be grateful if you would take the time to fill this survey out.     Wound care order history:                               Initial Visit: 20  ELOINA's   Right       Left               Date:              Imagin/10/20  Amputation of the 2nd digit at the level of the middle phalanx with associated soft tissue swelling      Cultures:  SENT ON 20  MRSA +              Antibiotics:                       Grafts:      Continuing wound care orders and information:                 Residence:                Continue home health care with:               Your wound-care supplies will be provided by:      Wound cleansing:               CY not scrub or use excessive force.              Wash hands with soap and water before and after dressing changes.             OZUKP to applying a clean dressing, cleanse wound with normal saline, wound cleanser, or mild soap and water.               Ask the physician or nurse before getting the wound(s) wet in a shower     Daily Wound management:              Keep weight off wounds and reposition every 2 hours.              Avoid standing for long periods of time.              Apply wraps/stockings in AM and remove at bedtime.              If swelling is present, elevate legs to the level of the heart or above for 30 minutes 4-5 times a day and/or when sitting.                                      When taking antibiotics take entire prescription as ordered by physician do not stop taking until medicine is all gone.                                                           Orders for this week:     10/01/20     Big Toe and Second Toe of Left Foot :Wash with soap and water. Pat dry. Paint with betadine   Wrap with conform  Secure With tape      Change Daily     Follow up with Dr Amanda Gaytan In  1 weeks in the wound care center  Call [phone] for any questions or concerns.   Date__________   Time____________             Treatment Note      Written Patient Dismissal Instructions Given Electronically signed by Liliana Zeng MD on 10/1/2020 at 8:57 AM

## 2020-10-06 RX ORDER — PIOGLITAZONEHYDROCHLORIDE 15 MG/1
TABLET ORAL
Qty: 16 TABLET | Refills: 1 | Status: SHIPPED | OUTPATIENT
Start: 2020-10-06 | End: 2021-01-11 | Stop reason: SDUPTHER

## 2020-10-08 ENCOUNTER — HOSPITAL ENCOUNTER (OUTPATIENT)
Dept: WOUND CARE | Age: 65
Discharge: HOME | End: 2020-10-08
Payer: MEDICARE

## 2020-10-08 VITALS
DIASTOLIC BLOOD PRESSURE: 73 MMHG | SYSTOLIC BLOOD PRESSURE: 155 MMHG | HEART RATE: 78 BPM | RESPIRATION RATE: 16 BRPM | TEMPERATURE: 97.9 F

## 2020-10-08 PROCEDURE — 11044 DBRDMT BONE 1ST 20 SQ CM/<: CPT

## 2020-10-08 RX ORDER — LIDOCAINE HYDROCHLORIDE 20 MG/ML
JELLY TOPICAL ONCE
Status: CANCELLED | OUTPATIENT
Start: 2020-10-08

## 2020-10-08 RX ORDER — LIDOCAINE 40 MG/G
CREAM TOPICAL ONCE
Status: CANCELLED | OUTPATIENT
Start: 2020-10-08

## 2020-10-08 RX ORDER — GENTAMICIN SULFATE 1 MG/G
OINTMENT TOPICAL ONCE
Status: CANCELLED | OUTPATIENT
Start: 2020-10-08

## 2020-10-08 RX ORDER — LIDOCAINE 50 MG/G
OINTMENT TOPICAL ONCE
Status: CANCELLED | OUTPATIENT
Start: 2020-10-08

## 2020-10-08 RX ORDER — GINSENG 100 MG
CAPSULE ORAL ONCE
Status: CANCELLED | OUTPATIENT
Start: 2020-10-08

## 2020-10-08 RX ORDER — BACITRACIN, NEOMYCIN, POLYMYXIN B 400; 3.5; 5 [USP'U]/G; MG/G; [USP'U]/G
OINTMENT TOPICAL ONCE
Status: CANCELLED | OUTPATIENT
Start: 2020-10-08

## 2020-10-08 RX ORDER — BETAMETHASONE DIPROPIONATE 0.05 %
OINTMENT (GRAM) TOPICAL ONCE
Status: CANCELLED | OUTPATIENT
Start: 2020-10-08

## 2020-10-08 RX ORDER — CLOBETASOL PROPIONATE 0.5 MG/G
OINTMENT TOPICAL ONCE
Status: CANCELLED | OUTPATIENT
Start: 2020-10-08

## 2020-10-08 RX ORDER — LIDOCAINE HYDROCHLORIDE 40 MG/ML
SOLUTION TOPICAL ONCE
Status: CANCELLED | OUTPATIENT
Start: 2020-10-08

## 2020-10-08 RX ORDER — BACITRACIN ZINC AND POLYMYXIN B SULFATE 500; 1000 [USP'U]/G; [USP'U]/G
OINTMENT TOPICAL ONCE
Status: CANCELLED | OUTPATIENT
Start: 2020-10-08

## 2020-10-08 NOTE — PROGRESS NOTES
HISTORY    Family History   Problem Relation Age of Onset    Heart Disease Father        SOCIAL HISTORY    Social History     Tobacco Use    Smoking status: Never Smoker    Smokeless tobacco: Never Used   Substance Use Topics    Alcohol use: Yes     Comment: caffeine 2 cups of coffee a day    Drug use: No       ALLERGIES    Allergies   Allergen Reactions    Morphine Rash       MEDICATIONS    Current Outpatient Medications on File Prior to Encounter   Medication Sig Dispense Refill    pioglitazone (ACTOS) 15 MG tablet TAKE ONE TABLET BY MOUTH EVERY EVENING 16 tablet 1    rivaroxaban (XARELTO) 20 MG TABS tablet Take 1 tablet by mouth daily (with breakfast) 30 tablet 5    CARTIA  MG extended release capsule Take 2 capsules by mouth daily 180 capsule 1    lisinopril (PRINIVIL;ZESTRIL) 40 MG tablet TAKE ONE TABLET BY MOUTH DAILY 90 tablet 0    metFORMIN (GLUCOPHAGE-XR) 500 MG extended release tablet TAKE FOUR TABLETS BY MOUTH DAILY WITH BREAKFAST 360 tablet 0    levothyroxine (SYNTHROID) 100 MCG tablet TAKE ONE TABLET BY MOUTH EVERY EVENING 90 tablet 0    hydroCHLOROthiazide (HYDRODIURIL) 25 MG tablet TAKE ONE TABLET BY MOUTH EVERY EVENING 90 tablet 1    glimepiride (AMARYL) 4 MG tablet TAKE ONE TABLET BY MOUTH TWICE A DAY WITH MEALS 180 tablet 1    PARoxetine (PAXIL) 30 MG tablet TAKE ONE TABLET BY MOUTH EVERY MORNING 30 tablet 5    celecoxib (CELEBREX) 200 MG capsule Take 1 capsule by mouth daily 90 capsule 1    atorvastatin (LIPITOR) 40 MG tablet Take 1 tablet by mouth daily 90 tablet 1    insulin detemir (LEVEMIR FLEXTOUCH) 100 UNIT/ML injection pen Inject 10 Units into the skin nightly 5 pen 3    insulin detemir (LEVEMIR) 100 UNIT/ML injection vial Inject 10 Units into the skin nightly 3 vial 1    gabapentin (NEURONTIN) 300 MG capsule       tiZANidine (ZANAFLEX) 2 MG tablet Take 1 tablet by mouth 3 times daily as needed (muscle spasm) 30 tablet 0    aspirin 81 MG tablet Take 81 mg by mouth daily      terbinafine (LAMISIL) 1 % cream Apply topically 2 times daily Apply topically 2 times daily.  Blood Glucose Monitoring Suppl (ONETOUCH VERIO IQ SYSTEM) W/DEVICE KIT       ONETOUCH VERIO strip       BD PEN NEEDLE RADU U/F 32G X 4 MM MISC       ONETOUCH DELICA LANCETS FINE MISC        No current facility-administered medications on file prior to encounter. REVIEW OF SYSTEMS      Constitutional: Negative for systemic symptoms including fever, chills and malaise. Objective:      BP (!) 155/73   Pulse 78   Temp 97.9 °F (36.6 °C)   Resp 16     PHYSICAL EXAM  Constitutional: Negative for systemic symptoms including fever, chills and malaise. General: The patient is in no acute distress. Mental status:  Patient is appropriate, is  oriented to place and plan of care. Dermatologic exam: Visual inspection of the periwound reveals the skin to be dry  Wound exam: see wound description below in procedure note      Assessment:     Problem List Items Addressed This Visit     WD-Dog bite of toe left 2nd toe    WD-Open wound of left great toe due to dog bite - Primary        Procedure Note    Indications:  Based on my examination of this patient's wound(s) today, sharp excision into necrotic bone is required to promote healing and evaluate the extent of previous healing. Performed by: John Leon MD    Consent obtained: Yes    Time out taken:  Yes    Pain Control: none       Debridement:Excisional Debridement    Using curette the wound(s) was/were sharply debrided down through and including the removal of bone. Devitalized Tissue Debrided:  biofilm, slough and necrotic/eschar    Pre Debridement Measurements:  Are located in the Wound Documentation Flow Sheet    All active wounds listed below with today's date are evaluated  Wound(s)    debrided this date include # : 2     Post  Debridement Measurements:  Wound 09/17/20 Toe (Comment  which one) Anterior; Left #1 Left Great Toe (Active)   Wound Image   10/08/20 0819   Wound Etiology Other 10/08/20 0819   Wound Cleansed Soap and water;Irrigated with saline 10/08/20 0819   Offloading for Diabetic Foot Ulcers No 10/08/20 0819   Wound Length (cm) 0.7 cm 10/08/20 0819   Wound Width (cm) 0.8 cm 10/08/20 0819   Wound Depth (cm) 0.1 cm 10/08/20 0819   Wound Surface Area (cm^2) 0.56 cm^2 10/08/20 0819   Change in Wound Size % (l*w) 90.67 10/08/20 0819   Wound Volume (cm^3) 0.06 cm^3 10/08/20 0819   Wound Healing % 90 10/08/20 0819   Post-Procedure Length (cm) 2 cm 09/24/20 0908   Post-Procedure Width (cm) 2 cm 09/24/20 0908   Post-Procedure Depth (cm) 0.1 cm 09/24/20 0908   Post-Procedure Surface Area (cm^2) 4 cm^2 09/24/20 0908   Post-Procedure Volume (cm^3) 0.4 cm^3 09/24/20 0908   Distance Tunneling (cm) 0 cm 10/08/20 0819   Tunneling Position ___ O'Clock 0 10/08/20 0819   Undermining Starts ___ O'Clock 0 10/08/20 0819   Undermining Ends___ O'Clock 0 10/08/20 0819   Undermining Maxium Distance (cm) 0 10/08/20 0819   Wound Assessment Other (Comment) 10/08/20 0819   Drainage Amount Small 10/08/20 0819   Drainage Description Serosanguinous 10/08/20 0819   Odor None 10/08/20 0819   Margins Defined edges; Unattached edges 10/08/20 0819   Wound Thickness Description not for Pressure Injury Not applicable 33/27/26 2047   Number of days: 21       Wound 09/17/20 Toe (Comment  which one) Left #2 Left Second Toe (Active)   Wound Image   09/17/20 0849   Wound Cleansed Rinsed/Irrigated with saline 10/01/20 0817   Wound Length (cm) 0.5 cm 10/08/20 0833   Wound Width (cm) 0.4 cm 10/08/20 0833   Wound Depth (cm) 0.1 cm 10/08/20 0833   Wound Surface Area (cm^2) 0.2 cm^2 10/08/20 0833   Change in Wound Size % (l*w) 93.33 10/08/20 0833   Wound Volume (cm^3) 0.02 cm^3 10/08/20 0833   Wound Healing % 93 10/08/20 0833   Post-Procedure Length (cm) 0.5 cm 10/08/20 0839   Post-Procedure Width (cm) 0.4 cm 10/08/20 0839   Post-Procedure Depth (cm) 0.1 cm 10/08/20 0839 Post-Procedure Surface Area (cm^2) 0.2 cm^2 10/08/20 0839   Post-Procedure Volume (cm^3) 0.02 cm^3 10/08/20 0839   Distance Tunneling (cm) 0 cm 10/01/20 08   Tunneling Position ___ O'Clock 0 10/01/20 0817   Undermining Starts ___ O'Clock 0 10/01/20 08   Undermining Ends___ O'Clock 0 10/01/20 08   Undermining Maxium Distance (cm) 0 10/01/20 08   Wound Thickness Description not for Pressure Injury Not applicable  1847   Number of days: 21       Percent of Wound(s) Debrided: approximately 100%    Total Surface Area Debrided:  0.2 sq cm     Bleeding:  Minimal    Hemostasis Achieved:  by pressure    Procedural Pain:  0  / 10     Post Procedural Pain:  0 / 10     Response to treatment:  Well tolerated by patient. Wound is has moderately improved    Plan:       Discharge Instructions         PHYSICIAN ORDERS AND DISCHARGE INSTRUCTIONS     NOTE: Upon discharge from the [address], you will receive a patient experience survey. We would be grateful if you would take the time to fill this survey out.     Wound care order history:                               Initial Visit: 20  ELOINA's   Right       Left               Date:              Imagin/10/20  Amputation of the 2nd digit at the level of the middle phalanx with associated soft tissue swelling      Cultures:  SENT ON 20  MRSA +              Antibiotics:                       Grafts:      Continuing wound care orders and information:                 Residence:                Continue home health care with:               Your wound-care supplies will be provided by:      Wound cleansing:               TZ not scrub or use excessive force.              Wash hands with soap and water before and after dressing changes.               VRODY to applying a clean dressing, cleanse wound with normal saline, wound cleanser, or mild soap and water.               Ask the physician or nurse before getting the wound(s) wet in a shower     Daily Wound management:              Keep weight off wounds and reposition every 2 hours.              Avoid standing for long periods of time.              Apply wraps/stockings in AM and remove at bedtime.              If swelling is present, elevate legs to the level of the heart or above for 30 minutes 4-5 times a day and/or when sitting.                                      When taking antibiotics take entire prescription as ordered by physician do not stop taking until medicine is all gone.                                                           Orders for this week:     10/08/20     Big Toe and Second Toe of Left Foot :Wash with soap and water. Pat dry. Place betadine packing gauze on wound bed  Wrap with conform  Secure With tape      Change Daily     Follow up with Dr Cher Traylor In  1 weeks in the wound care center  Call [phone] for any questions or concerns.   Date__________   Time____________          Treatment Note      Written Patient Dismissal Instructions Given            Electronically signed by Tino Viera MD on 10/8/2020 at 8:51 AM

## 2020-10-15 ENCOUNTER — TELEPHONE (OUTPATIENT)
Dept: FAMILY MEDICINE CLINIC | Age: 65
End: 2020-10-15

## 2020-10-15 ENCOUNTER — HOSPITAL ENCOUNTER (OUTPATIENT)
Dept: WOUND CARE | Age: 65
Discharge: HOME | End: 2020-10-15
Payer: MEDICARE

## 2020-10-15 VITALS
TEMPERATURE: 97.8 F | DIASTOLIC BLOOD PRESSURE: 69 MMHG | HEART RATE: 92 BPM | SYSTOLIC BLOOD PRESSURE: 125 MMHG | RESPIRATION RATE: 20 BRPM

## 2020-10-15 PROCEDURE — 11044 DBRDMT BONE 1ST 20 SQ CM/<: CPT

## 2020-10-15 RX ORDER — GENTAMICIN SULFATE 1 MG/G
OINTMENT TOPICAL ONCE
Status: CANCELLED | OUTPATIENT
Start: 2020-10-15

## 2020-10-15 RX ORDER — LIDOCAINE HYDROCHLORIDE 40 MG/ML
SOLUTION TOPICAL ONCE
Status: CANCELLED | OUTPATIENT
Start: 2020-10-15

## 2020-10-15 RX ORDER — LIDOCAINE HYDROCHLORIDE 20 MG/ML
JELLY TOPICAL ONCE
Status: CANCELLED | OUTPATIENT
Start: 2020-10-15

## 2020-10-15 RX ORDER — GINSENG 100 MG
CAPSULE ORAL ONCE
Status: CANCELLED | OUTPATIENT
Start: 2020-10-15

## 2020-10-15 RX ORDER — LIDOCAINE 50 MG/G
OINTMENT TOPICAL ONCE
Status: CANCELLED | OUTPATIENT
Start: 2020-10-15

## 2020-10-15 RX ORDER — BACITRACIN, NEOMYCIN, POLYMYXIN B 400; 3.5; 5 [USP'U]/G; MG/G; [USP'U]/G
OINTMENT TOPICAL ONCE
Status: CANCELLED | OUTPATIENT
Start: 2020-10-15

## 2020-10-15 RX ORDER — CLOBETASOL PROPIONATE 0.5 MG/G
OINTMENT TOPICAL ONCE
Status: CANCELLED | OUTPATIENT
Start: 2020-10-15

## 2020-10-15 RX ORDER — BACITRACIN ZINC AND POLYMYXIN B SULFATE 500; 1000 [USP'U]/G; [USP'U]/G
OINTMENT TOPICAL ONCE
Status: CANCELLED | OUTPATIENT
Start: 2020-10-15

## 2020-10-15 RX ORDER — BETAMETHASONE DIPROPIONATE 0.05 %
OINTMENT (GRAM) TOPICAL ONCE
Status: CANCELLED | OUTPATIENT
Start: 2020-10-15

## 2020-10-15 RX ORDER — LIDOCAINE 40 MG/G
CREAM TOPICAL ONCE
Status: CANCELLED | OUTPATIENT
Start: 2020-10-15

## 2020-10-15 NOTE — PROGRESS NOTES
Wound Care Center Progress Note with Procedure Note      Jade Norton  AGE: 77 y.o. GENDER: male  : 1955  EPISODE DATE:  10/15/2020     Subjective:     Chief Complaint   Patient presents with    Wound Check     left foot         HISTORY of PRESENT ILLNESS      Madan Baptiste is a 77 y.o. male who presents today for wound evaluation of Chronic traumatic wound(s) of toes left, great toe, 2nd toe. The wound is of moderate severity. The underlying cause of the wound is dog bite. Wound Pain Timing/Severity: none  Quality of pain: N/A  Severity of pain:  0 / 10   Modifying Factors: diabetes and obesity  Associated Signs/Symptoms: none        PAST MEDICAL HISTORY        Diagnosis Date    Arrhythmia     Atrial fibrillation ([address]     Carotid Doppler 2018    Mild (0-49%) disease of the Bilateral Internal carotid artery. No hemodynamically significant stenosis noted. Normal vertebral flow.  Diabetes mellitus ([address]     H/O echocardiogram 2020    EF 50%. Sclerotic aortic valve with mild stenosis. 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 No reversible myocardial ischemia seen. Uneventful pharmacological     History of echocardiogram 2017    Ejection fraction is visually estimated at 55%. mildly Increased LVOT velocity. Aortic valve leaflets are somewhat thickened. Aortic valve appears tricuspid. Trivial aortic regurgitation is noted. Mitral annular calcification is present. Mild calcification of the anterior leaflet of the mitral valve.     Hyperlipidemia     Hypertension     WD-Dog bite of toe left 2nd toe 2020    WD-Open wound of left great toe due to dog bite 2020       PAST SURGICAL HISTORY    Past Surgical History:   Procedure Laterality Date    OTHER SURGICAL HISTORY  2018    Atrial Fib cardiac Ablation       FAMILY HISTORY    Family History   Problem Relation Age of Onset    Heart Disease Father        SOCIAL HISTORY    Social History     Tobacco Use    Smoking status: Never Smoker    Smokeless tobacco: Never Used   Substance Use Topics    Alcohol use: Yes     Comment: caffeine 2 cups of coffee a day    Drug use: No       ALLERGIES    Allergies   Allergen Reactions    Morphine Rash       MEDICATIONS    Current Outpatient Medications on File Prior to Encounter   Medication Sig Dispense Refill    pioglitazone (ACTOS) 15 MG tablet TAKE ONE TABLET BY MOUTH EVERY EVENING 16 tablet 1    rivaroxaban (XARELTO) 20 MG TABS tablet Take 1 tablet by mouth daily (with breakfast) 30 tablet 5    CARTIA  MG extended release capsule Take 2 capsules by mouth daily 180 capsule 1    lisinopril (PRINIVIL;ZESTRIL) 40 MG tablet TAKE ONE TABLET BY MOUTH DAILY 90 tablet 0    metFORMIN (GLUCOPHAGE-XR) 500 MG extended release tablet TAKE FOUR TABLETS BY MOUTH DAILY WITH BREAKFAST 360 tablet 0    levothyroxine (SYNTHROID) 100 MCG tablet TAKE ONE TABLET BY MOUTH EVERY EVENING 90 tablet 0    hydroCHLOROthiazide (HYDRODIURIL) 25 MG tablet TAKE ONE TABLET BY MOUTH EVERY EVENING 90 tablet 1    glimepiride (AMARYL) 4 MG tablet TAKE ONE TABLET BY MOUTH TWICE A DAY WITH MEALS 180 tablet 1    PARoxetine (PAXIL) 30 MG tablet TAKE ONE TABLET BY MOUTH EVERY MORNING 30 tablet 5    celecoxib (CELEBREX) 200 MG capsule Take 1 capsule by mouth daily 90 capsule 1    atorvastatin (LIPITOR) 40 MG tablet Take 1 tablet by mouth daily 90 tablet 1    insulin detemir (LEVEMIR FLEXTOUCH) 100 UNIT/ML injection pen Inject 10 Units into the skin nightly 5 pen 3    insulin detemir (LEVEMIR) 100 UNIT/ML injection vial Inject 10 Units into the skin nightly 3 vial 1    gabapentin (NEURONTIN) 300 MG capsule       tiZANidine (ZANAFLEX) 2 MG tablet Take 1 tablet by mouth 3 times daily as needed (muscle spasm) 30 tablet 0    aspirin 81 MG tablet Take 81 mg by mouth daily      terbinafine (LAMISIL) 1 % cream Apply topically 2 times daily Apply topically 2 times daily.  Blood Glucose Monitoring Suppl (ONETOUCH VERIO IQ SYSTEM) W/DEVICE KIT       ONETOUCH VERIO strip       BD PEN NEEDLE RADU U/F 32G X 4 MM MISC       ONETOUCH DELICA LANCETS FINE MISC        No current facility-administered medications on file prior to encounter. REVIEW OF SYSTEMS      Constitutional: Negative for systemic symptoms including fever, chills and malaise. Objective:      /69   Pulse 92   Temp 97.8 °F (36.6 °C) (Temporal)   Resp 20     PHYSICAL EXAM  Constitutional: Negative for systemic symptoms including fever, chills and malaise. General: The patient is in no acute distress. Mental status:  Patient is appropriate, is  oriented to place and plan of care. Dermatologic exam: Visual inspection of the periwound reveals the skin to be normal in turgor and texture  Wound exam: see wound description below in procedure note      Assessment:     Problem List Items Addressed This Visit     WD-Dog bite of toe left 2nd toe - Primary    Relevant Orders    Supply: Wound Cleanser    Supply: Wound Dressings    Supply: Cover and Secure    WD-Open wound of left great toe due to dog bite    Relevant Orders    Supply: Wound Cleanser    Supply: Wound Dressings    Supply: Cover and Secure        Procedure Note    Indications:  Based on my examination of this patient's wound(s) today, sharp excision into necrotic bone is required to promote healing and evaluate the extent of previous healing. Performed by: Larry Antonio MD    Consent obtained: Yes    Time out taken:  Yes    Pain Control: none       Debridement:Excisional Debridement    Using curette the wound(s) was/were sharply debrided down through and including the removal of bone.         Devitalized Tissue Debrided:  biofilm and necrotic/eschar    Pre Debridement Measurements:  Are located in the Wound Documentation Flow Size % (l*w) 40 10/15/20 0815   Wound Volume (cm^3) 0.18 cm^3 10/15/20 0815   Wound Healing % 40 10/15/20 0815   Post-Procedure Length (cm) 1.2 cm 10/15/20 0845   Post-Procedure Width (cm) 1.5 cm 10/15/20 0845   Post-Procedure Depth (cm) 0.1 cm 10/15/20 0845   Post-Procedure Surface Area (cm^2) 1.8 cm^2 10/15/20 0845   Post-Procedure Volume (cm^3) 0.18 cm^3 10/15/20 0845   Distance Tunneling (cm) 0 cm 10/15/20 0815   Tunneling Position ___ O'Clock 0 10/15/20 0815   Undermining Starts ___ O'Clock 0 10/15/20 0815   Undermining Ends___ O'Clock 0 10/15/20 0815   Undermining Maxium Distance (cm) 0 10/15/20 0815   Drainage Amount Moderate 10/15/20 0815   Drainage Description Clear 10/15/20 0815   Odor None 10/15/20 0815   Magdalena-wound Assessment Intact 10/15/20 0815   Margins Defined edges 10/15/20 0815   Wound Thickness Description not for Pressure Injury Full thickness 10/15/20 0815   Number of days: 28       Percent of Wound(s) Debrided: approximately 100%    Total Surface Area Debrided:  0.1 sq cm     Bleeding:  Minimal    Hemostasis Achieved:  by pressure    Procedural Pain:  0  / 10     Post Procedural Pain:  0 / 10     Response to treatment:  Well tolerated by patient. Wound is has moderately improved    Plan:       Discharge Instructions         Discharge Instructions           PHYSICIAN ORDERS AND DISCHARGE INSTRUCTIONS     NOTE: Upon discharge from the [address] [place] [address],[address], you will receive a patient experience survey.  We would be grateful if you would take the time to fill this survey out.     Wound care order history:                               Initial Visit: 20  ELOINA's   Right       Left               Date:              Imagin/10/20  Amputation of the 2nd digit at the level of the middle phalanx with associated soft tissue swelling      Cultures:  SENT ON 20  MRSA +              Antibiotics:                       Grafts:      Continuing wound care orders and information:                 Residence:                Continue home health care with:               Your wound-care supplies will be provided by:      Wound cleansing:               CC not scrub or use excessive force.              Wash hands with soap and water before and after dressing changes.             EBNGT to applying a clean dressing, cleanse wound with normal saline, wound cleanser, or mild soap and water.               Ask the physician or nurse before getting the wound(s) wet in a shower     Daily Wound management:              Keep weight off wounds and reposition every 2 hours.              Avoid standing for long periods of time.              Apply wraps/stockings in AM and remove at bedtime.              If swelling is present, elevate legs to the level of the heart or above for 30 minutes 4-5 times a day and/or when sitting.                                      When taking antibiotics take entire prescription as ordered by physician do not stop taking until medicine is all gone.                                                           Orders for this week:     10/15/20     Big Toe and Second Toe of Left Foot :Wash with soap and water. Pat dry. Place betadine packing gauze on wound bed  Wrap with conform  Secure With tape      Change Daily     Follow up with Dr Gilbert Little In  1 weeks in the wound care center  Call [phone] for any questions or concerns.   Date__________   Time____________             Treatment Note      Written Patient Dismissal Instructions Given            Electronically signed by Tea Moore MD on 10/15/2020 at 8:49 AM

## 2020-10-23 RX ORDER — GABAPENTIN 300 MG/1
300 CAPSULE ORAL 3 TIMES DAILY
Qty: 90 CAPSULE | Refills: 2 | Status: SHIPPED | OUTPATIENT
Start: 2020-10-23 | End: 2021-03-05 | Stop reason: SDUPTHER

## 2020-10-28 RX ORDER — METFORMIN HYDROCHLORIDE 500 MG/1
TABLET, EXTENDED RELEASE ORAL
Qty: 360 TABLET | Refills: 0 | Status: SHIPPED | OUTPATIENT
Start: 2020-10-28 | End: 2021-02-15 | Stop reason: SDUPTHER

## 2020-10-28 RX ORDER — LISINOPRIL 40 MG/1
TABLET ORAL
Qty: 90 TABLET | Refills: 0 | Status: SHIPPED | OUTPATIENT
Start: 2020-10-28 | End: 2021-02-02

## 2020-10-28 RX ORDER — LEVOTHYROXINE SODIUM 0.1 MG/1
TABLET ORAL
Qty: 90 TABLET | Refills: 0 | Status: SHIPPED | OUTPATIENT
Start: 2020-10-28 | End: 2021-02-02

## 2020-10-28 NOTE — TELEPHONE ENCOUNTER
Please call the patient and instruct him to come in for FASTING blood work at his earliest convenience. He can come to our lab or go to the [facility] if he finds their hours more convenient.      Thanks,  Odalys

## 2020-10-29 ENCOUNTER — HOSPITAL ENCOUNTER (OUTPATIENT)
Dept: WOUND CARE | Age: 65
Discharge: HOME | End: 2020-10-29
Payer: MEDICARE

## 2020-10-29 VITALS
DIASTOLIC BLOOD PRESSURE: 56 MMHG | RESPIRATION RATE: 18 BRPM | HEART RATE: 86 BPM | SYSTOLIC BLOOD PRESSURE: 121 MMHG | TEMPERATURE: 97.7 F

## 2020-10-29 PROCEDURE — 11042 DBRDMT SUBQ TIS 1ST 20SQCM/<: CPT

## 2020-10-29 RX ORDER — LIDOCAINE HYDROCHLORIDE 40 MG/ML
SOLUTION TOPICAL ONCE
Status: DISCONTINUED | OUTPATIENT
Start: 2020-10-29 | End: 2020-10-30 | Stop reason: HOSPADM

## 2020-10-29 RX ORDER — CLOBETASOL PROPIONATE 0.5 MG/G
OINTMENT TOPICAL ONCE
Status: CANCELLED | OUTPATIENT
Start: 2020-10-29

## 2020-10-29 RX ORDER — LIDOCAINE HYDROCHLORIDE 20 MG/ML
JELLY TOPICAL ONCE
Status: CANCELLED | OUTPATIENT
Start: 2020-10-29

## 2020-10-29 RX ORDER — BETAMETHASONE DIPROPIONATE 0.05 %
OINTMENT (GRAM) TOPICAL ONCE
Status: CANCELLED | OUTPATIENT
Start: 2020-10-29

## 2020-10-29 RX ORDER — LIDOCAINE 50 MG/G
OINTMENT TOPICAL ONCE
Status: CANCELLED | OUTPATIENT
Start: 2020-10-29

## 2020-10-29 RX ORDER — GINSENG 100 MG
CAPSULE ORAL ONCE
Status: CANCELLED | OUTPATIENT
Start: 2020-10-29

## 2020-10-29 RX ORDER — LIDOCAINE 40 MG/G
CREAM TOPICAL ONCE
Status: CANCELLED | OUTPATIENT
Start: 2020-10-29

## 2020-10-29 RX ORDER — BACITRACIN, NEOMYCIN, POLYMYXIN B 400; 3.5; 5 [USP'U]/G; MG/G; [USP'U]/G
OINTMENT TOPICAL ONCE
Status: CANCELLED | OUTPATIENT
Start: 2020-10-29

## 2020-10-29 RX ORDER — LIDOCAINE HYDROCHLORIDE 40 MG/ML
SOLUTION TOPICAL ONCE
Status: CANCELLED | OUTPATIENT
Start: 2020-10-29

## 2020-10-29 RX ORDER — GENTAMICIN SULFATE 1 MG/G
OINTMENT TOPICAL ONCE
Status: CANCELLED | OUTPATIENT
Start: 2020-10-29

## 2020-10-29 RX ORDER — BACITRACIN ZINC AND POLYMYXIN B SULFATE 500; 1000 [USP'U]/G; [USP'U]/G
OINTMENT TOPICAL ONCE
Status: CANCELLED | OUTPATIENT
Start: 2020-10-29

## 2020-10-29 NOTE — PROGRESS NOTES
Wound Care Center Progress Note with Procedure Note      Jade Norton  AGE: 77 y.o. GENDER: male  : 1955  EPISODE DATE:  10/29/2020     Subjective:     Chief Complaint   Patient presents with    Wound Check     left foot          HISTORY of PRESENT ILLNESS      Madan Baptiste is a 77 y.o. male who presents today for wound evaluation of Chronic traumatic wound(s) of toes left, great toe, 2nd toe. The wound is of marked severity. The underlying cause of the wound is dog bite. diabetes  Wound Pain Timing/Severity: none  Quality of pain: N/A  Severity of pain:  0 / 10   Modifying Factors: diabetes, obesity and anticoagulation therapy  Associated Signs/Symptoms: none        PAST MEDICAL HISTORY        Diagnosis Date    Arrhythmia     Atrial fibrillation ([address]     Carotid Doppler 2018    Mild (0-49%) disease of the Bilateral Internal carotid artery. No hemodynamically significant stenosis noted. Normal vertebral flow.  Diabetes mellitus ([address]     H/O echocardiogram 2020    EF 50%. Sclerotic aortic valve with mild stenosis. 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 No reversible myocardial ischemia seen. Uneventful pharmacological     History of echocardiogram 2017    Ejection fraction is visually estimated at 55%. mildly Increased LVOT velocity. Aortic valve leaflets are somewhat thickened. Aortic valve appears tricuspid. Trivial aortic regurgitation is noted. Mitral annular calcification is present. Mild calcification of the anterior leaflet of the mitral valve.     Hyperlipidemia     Hypertension     WD-Dog bite of toe left 2nd toe 2020    WD-Open wound of left great toe due to dog bite 2020       PAST SURGICAL HISTORY    Past Surgical History:   Procedure Laterality Date    OTHER SURGICAL HISTORY  2018    Atrial Fib cardiac Ablation       FAMILY HISTORY    Family History   Problem Relation Age of Onset    Heart Disease Father        SOCIAL HISTORY    Social History     Tobacco Use    Smoking status: Never Smoker    Smokeless tobacco: Never Used   Substance Use Topics    Alcohol use: Yes     Comment: caffeine 2 cups of coffee a day    Drug use: No       ALLERGIES    Allergies   Allergen Reactions    Morphine Rash       MEDICATIONS    Current Outpatient Medications on File Prior to Encounter   Medication Sig Dispense Refill    lisinopril (PRINIVIL;ZESTRIL) 40 MG tablet TAKE ONE TABLET BY MOUTH DAILY 90 tablet 0    metFORMIN (GLUCOPHAGE-XR) 500 MG extended release tablet TAKE FOUR TABLETS BY MOUTH DAILY WITH BREAKFAST 360 tablet 0    levothyroxine (SYNTHROID) 100 MCG tablet TAKE ONE TABLET BY MOUTH EVERY EVENING 90 tablet 0    gabapentin (NEURONTIN) 300 MG capsule Take 1 capsule by mouth 3 times daily for 30 days.  90 capsule 2    pioglitazone (ACTOS) 15 MG tablet TAKE ONE TABLET BY MOUTH EVERY EVENING 16 tablet 1    rivaroxaban (XARELTO) 20 MG TABS tablet Take 1 tablet by mouth daily (with breakfast) 30 tablet 5    CARTIA  MG extended release capsule Take 2 capsules by mouth daily 180 capsule 1    hydroCHLOROthiazide (HYDRODIURIL) 25 MG tablet TAKE ONE TABLET BY MOUTH EVERY EVENING 90 tablet 1    glimepiride (AMARYL) 4 MG tablet TAKE ONE TABLET BY MOUTH TWICE A DAY WITH MEALS 180 tablet 1    PARoxetine (PAXIL) 30 MG tablet TAKE ONE TABLET BY MOUTH EVERY MORNING 30 tablet 5    celecoxib (CELEBREX) 200 MG capsule Take 1 capsule by mouth daily 90 capsule 1    atorvastatin (LIPITOR) 40 MG tablet Take 1 tablet by mouth daily 90 tablet 1    insulin detemir (LEVEMIR FLEXTOUCH) 100 UNIT/ML injection pen Inject 10 Units into the skin nightly 5 pen 3    insulin detemir (LEVEMIR) 100 UNIT/ML injection vial Inject 10 Units into the skin nightly 3 vial 1    tiZANidine (ZANAFLEX) 2 MG tablet Take 1 tablet by mouth 3 times Yes    Pain Control: Xylocaine gel       Debridement:Excisional Debridement    Using curette the wound(s) was/were sharply debrided down through and including the removal of subcutaneous tissue. Devitalized Tissue Debrided:  biofilm and necrotic/eschar    Pre Debridement Measurements:  Are located in the Wound Documentation Flow Sheet    All active wounds listed below with today's date are evaluated  Wound(s)    debrided this date include # : 2     Post  Debridement Measurements:  Wound 09/17/20 Toe (Comment  which one) Anterior; Left #1 Left Great Toe (Active)   Wound Image   10/08/20 0819   Wound Etiology Other 10/29/20 0819   Dressing Status New dressing applied 10/29/20 0839   Wound Cleansed Soap and water 10/29/20 0819   Offloading for Diabetic Foot Ulcers No 10/08/20 0819   Wound Length (cm) 0.7 cm 10/29/20 0819   Wound Width (cm) 0.8 cm 10/29/20 0819   Wound Depth (cm) 0.1 cm 10/29/20 0819   Wound Surface Area (cm^2) 0.56 cm^2 10/29/20 0819   Change in Wound Size % (l*w) 90.67 10/29/20 0819   Wound Volume (cm^3) 0.06 cm^3 10/29/20 0819   Wound Healing % 90 10/29/20 0819   Post-Procedure Length (cm) 0.7 cm 10/29/20 0838   Post-Procedure Width (cm) 0.8 cm 10/29/20 0838   Post-Procedure Depth (cm) 0.1 cm 10/29/20 0838   Post-Procedure Surface Area (cm^2) 0.56 cm^2 10/29/20 0838   Post-Procedure Volume (cm^3) 0.06 cm^3 10/29/20 0838   Distance Tunneling (cm) 0 cm 10/29/20 0819   Tunneling Position ___ O'Clock 0 10/29/20 0819   Undermining Starts ___ O'Clock 0 10/29/20 0819   Undermining Ends___ O'Clock 0 10/29/20 0819   Undermining Maxium Distance (cm) 0 10/29/20 0819   Wound Assessment Other (Comment) 10/29/20 0819   Drainage Amount Small 10/29/20 0819   Drainage Description Yellow 10/29/20 0819   Odor None 10/29/20 0819   Magdalena-wound Assessment Intact 10/29/20 0819   Margins Defined edges 10/29/20 0819   Wound Thickness Description not for Pressure Injury Not applicable 65/05/23 1882   Number of days: 41 history:                               Initial Visit: 09/16/20  ELOINA's   Right       Left               Date:              DKQOKHU: 9/10/20  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              Wash hands with soap and water before and after dressing changes.             WHTUN to applying a clean dressing, cleanse wound with normal saline, wound cleanser, or mild soap and water.               Ask the physician or nurse before getting the wound(s) wet in a shower     Daily Wound management:              Keep weight off wounds and reposition every 2 hours.              Avoid standing for long periods of time.              Apply wraps/stockings in AM and remove at bedtime.              If swelling is present, elevate legs to the level of the heart or above for 30 minutes 4-5 times a day and/or when sitting.                                      When taking antibiotics take entire prescription as ordered by physician do not stop taking until medicine is all gone.                                                           Orders for this week:     10/15/20     Big Toe and Second Toe of Left Foot :Wash with soap and water. Pat dry. Place betadine packing gauze on wound bed  Wrap with conform  Secure With tape      Change Daily     Follow up with Dr Mac Borrego In  2 weeks in the wound care center  Call [phone] for any questions or concerns. Date__________   Time____________             Treatment Note Wound 09/17/20 Toe (Comment  which one) Anterior; Left #1 Left Great Toe-Dressing/Treatment: (betadine gauze, conform

## 2020-10-30 NOTE — TELEPHONE ENCOUNTER
CALLED PT PER JOSE RAMON NOTE,  PT STATES HE HAS APPT 11-4-20 FOR FLU SHOT AND WILL GET LABS DRAWN AT THAT TIME.

## 2020-11-04 ENCOUNTER — IMMUNIZATION (OUTPATIENT)
Dept: FAMILY MEDICINE CLINIC | Age: 65
End: 2020-11-04
Payer: MEDICARE

## 2020-11-04 PROCEDURE — G0008 ADMIN INFLUENZA VIRUS VAC: HCPCS | Performed by: FAMILY MEDICINE

## 2020-11-04 PROCEDURE — 90694 VACC AIIV4 NO PRSRV 0.5ML IM: CPT | Performed by: FAMILY MEDICINE

## 2020-11-10 ENCOUNTER — TELEPHONE (OUTPATIENT)
Dept: FAMILY MEDICINE CLINIC | Age: 65
End: 2020-11-10

## 2020-11-10 NOTE — TELEPHONE ENCOUNTER
Wants to switch the Cartia XT to a generic-------can call this to change. They can just document this.

## 2020-11-11 NOTE — TELEPHONE ENCOUNTER
This has been [facility] for the branded drug for a long time, they need to fill the Cartia XT as prescribed, not the generic.  Thanks

## 2020-11-12 ENCOUNTER — HOSPITAL ENCOUNTER (OUTPATIENT)
Dept: WOUND CARE | Age: 65
Discharge: HOME | End: 2020-11-12
Payer: MEDICARE

## 2020-11-12 VITALS — HEART RATE: 108 BPM | RESPIRATION RATE: 20 BRPM | DIASTOLIC BLOOD PRESSURE: 79 MMHG | SYSTOLIC BLOOD PRESSURE: 149 MMHG

## 2020-11-12 PROCEDURE — 11043 DBRDMT MUSC&/FSCA 1ST 20/<: CPT

## 2020-11-12 RX ORDER — LIDOCAINE HYDROCHLORIDE 40 MG/ML
SOLUTION TOPICAL ONCE
Status: DISCONTINUED | OUTPATIENT
Start: 2020-11-12 | End: 2020-11-13 | Stop reason: HOSPADM

## 2020-11-12 RX ORDER — LIDOCAINE HYDROCHLORIDE 20 MG/ML
JELLY TOPICAL ONCE
Status: CANCELLED | OUTPATIENT
Start: 2020-11-12

## 2020-11-12 RX ORDER — CLOBETASOL PROPIONATE 0.5 MG/G
OINTMENT TOPICAL ONCE
Status: CANCELLED | OUTPATIENT
Start: 2020-11-12

## 2020-11-12 RX ORDER — GENTAMICIN SULFATE 1 MG/G
OINTMENT TOPICAL ONCE
Status: CANCELLED | OUTPATIENT
Start: 2020-11-12

## 2020-11-12 RX ORDER — BACITRACIN, NEOMYCIN, POLYMYXIN B 400; 3.5; 5 [USP'U]/G; MG/G; [USP'U]/G
OINTMENT TOPICAL ONCE
Status: CANCELLED | OUTPATIENT
Start: 2020-11-12

## 2020-11-12 RX ORDER — BETAMETHASONE DIPROPIONATE 0.05 %
OINTMENT (GRAM) TOPICAL ONCE
Status: CANCELLED | OUTPATIENT
Start: 2020-11-12

## 2020-11-12 RX ORDER — BACITRACIN ZINC AND POLYMYXIN B SULFATE 500; 1000 [USP'U]/G; [USP'U]/G
OINTMENT TOPICAL ONCE
Status: CANCELLED | OUTPATIENT
Start: 2020-11-12

## 2020-11-12 RX ORDER — GINSENG 100 MG
CAPSULE ORAL ONCE
Status: CANCELLED | OUTPATIENT
Start: 2020-11-12

## 2020-11-12 RX ORDER — LIDOCAINE HYDROCHLORIDE 40 MG/ML
SOLUTION TOPICAL ONCE
Status: CANCELLED | OUTPATIENT
Start: 2020-11-12

## 2020-11-12 RX ORDER — LIDOCAINE 40 MG/G
CREAM TOPICAL ONCE
Status: CANCELLED | OUTPATIENT
Start: 2020-11-12

## 2020-11-12 RX ORDER — LIDOCAINE 50 MG/G
OINTMENT TOPICAL ONCE
Status: CANCELLED | OUTPATIENT
Start: 2020-11-12

## 2020-11-12 NOTE — TELEPHONE ENCOUNTER
----Please help! !!---------------      [facility] called [facility] about the [facility] and they don't have any and are not sure if they can get it again----so he called [facility] and they don't carry that at all. Patient is out of this and he does not know what pharmacy we can send the Elizabeth prescription to.

## 2020-11-12 NOTE — PROGRESS NOTES
Wound Care Center Progress Note with Procedure Note      Carroll Norton  AGE: 77 y.o. GENDER: male  : 1955  EPISODE DATE:  2020     Subjective:     Chief Complaint   Patient presents with    Wound Check     left ft         HISTORY of PRESENT ILLNESS      Aure Bridges is a 77 y.o. male who presents today for wound evaluation of Chronic traumatic wound(s) of toes left, 2nd toe. The wound is of moderate severity. The underlying cause of the wound is trauma. diabetes  Wound Pain Timing/Severity: none  Quality of pain: N/A  Severity of pain:  0 / 10   Modifying Factors: diabetes and obesity  Associated Signs/Symptoms: none        PAST MEDICAL HISTORY        Diagnosis Date    Arrhythmia     Atrial fibrillation ([address]     Carotid Doppler 2018    Mild (0-49%) disease of the Bilateral Internal carotid artery. No hemodynamically significant stenosis noted. Normal vertebral flow.  Diabetes mellitus ([address]     H/O echocardiogram 2020    EF 50%. Sclerotic aortic valve with mild stenosis. 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 No reversible myocardial ischemia seen. Uneventful pharmacological     History of echocardiogram 2017    Ejection fraction is visually estimated at 55%. mildly Increased LVOT velocity. Aortic valve leaflets are somewhat thickened. Aortic valve appears tricuspid. Trivial aortic regurgitation is noted. Mitral annular calcification is present. Mild calcification of the anterior leaflet of the mitral valve.     Hyperlipidemia     Hypertension     WD-Dog bite of toe left 2nd toe 2020    WD-Open wound of left great toe due to dog bite 2020       PAST SURGICAL HISTORY    Past Surgical History:   Procedure Laterality Date    OTHER SURGICAL HISTORY  2018    Atrial Fib cardiac Ablation       FAMILY HISTORY    Family History   Problem Relation Age of Onset    Heart Disease Father        SOCIAL HISTORY    Social History     Tobacco Use    Smoking status: Never Smoker    Smokeless tobacco: Never Used   Substance Use Topics    Alcohol use: Yes     Comment: caffeine 2 cups of coffee a day    Drug use: No       ALLERGIES    Allergies   Allergen Reactions    Morphine Rash       MEDICATIONS    Current Outpatient Medications on File Prior to Encounter   Medication Sig Dispense Refill    lisinopril (PRINIVIL;ZESTRIL) 40 MG tablet TAKE ONE TABLET BY MOUTH DAILY 90 tablet 0    metFORMIN (GLUCOPHAGE-XR) 500 MG extended release tablet TAKE FOUR TABLETS BY MOUTH DAILY WITH BREAKFAST 360 tablet 0    levothyroxine (SYNTHROID) 100 MCG tablet TAKE ONE TABLET BY MOUTH EVERY EVENING 90 tablet 0    gabapentin (NEURONTIN) 300 MG capsule Take 1 capsule by mouth 3 times daily for 30 days.  90 capsule 2    rivaroxaban (XARELTO) 20 MG TABS tablet Take 1 tablet by mouth daily (with breakfast) 30 tablet 5    CARTIA  MG extended release capsule Take 2 capsules by mouth daily 180 capsule 1    hydroCHLOROthiazide (HYDRODIURIL) 25 MG tablet TAKE ONE TABLET BY MOUTH EVERY EVENING 90 tablet 1    glimepiride (AMARYL) 4 MG tablet TAKE ONE TABLET BY MOUTH TWICE A DAY WITH MEALS 180 tablet 1    PARoxetine (PAXIL) 30 MG tablet TAKE ONE TABLET BY MOUTH EVERY MORNING 30 tablet 5    celecoxib (CELEBREX) 200 MG capsule Take 1 capsule by mouth daily 90 capsule 1    atorvastatin (LIPITOR) 40 MG tablet Take 1 tablet by mouth daily 90 tablet 1    insulin detemir (LEVEMIR FLEXTOUCH) 100 UNIT/ML injection pen Inject 10 Units into the skin nightly 5 pen 3    insulin detemir (LEVEMIR) 100 UNIT/ML injection vial Inject 10 Units into the skin nightly 3 vial 1    tiZANidine (ZANAFLEX) 2 MG tablet Take 1 tablet by mouth 3 times daily as needed (muscle spasm) 30 tablet 0    aspirin 81 MG tablet Take 81 mg by mouth daily      terbinafine (LAMISIL) 1 % cream Apply topically 2 times daily Apply topically 2 times daily.  Blood Glucose Monitoring Suppl (ONETOUCH VERIO IQ SYSTEM) W/DEVICE KIT       ONETOUCH VERIO strip       BD PEN NEEDLE RADU U/F 32G X 4 MM MISC       ONETOUCH DELICA LANCETS FINE MISC       pioglitazone (ACTOS) 15 MG tablet TAKE ONE TABLET BY MOUTH EVERY EVENING 16 tablet 1     No current facility-administered medications on file prior to encounter. REVIEW OF SYSTEMS      Constitutional: Negative for systemic symptoms including fever, chills and malaise. Objective:      BP (!) 149/79   Pulse 108   Resp 20     PHYSICAL EXAM  Constitutional: Negative for systemic symptoms including fever, chills and malaise. General: The patient is in no acute distress. Mental status:  Patient is appropriate, is  oriented to place and plan of care. Dermatologic exam: Visual inspection of the periwound reveals the skin to be normal in turgor and texture  Wound exam: see wound description below in procedure note      Assessment:     Problem List Items Addressed This Visit     WD-Dog bite of toe left 2nd toe - Primary    Relevant Medications    lidocaine (XYLOCAINE) 4 % external solution    Other Relevant Orders    Supply: Wound Cleanser    Supply: Wound Dressings    Supply: Cover and Secure    WD-Open wound of left great toe due to dog bite    Relevant Medications    lidocaine (XYLOCAINE) 4 % external solution    Other Relevant Orders    Supply: Wound Cleanser    Supply: Wound Dressings    Supply: Cover and Secure        Procedure Note    Indications:  Based on my examination of this patient's wound(s) today, sharp excision into necrotic muscle/fascia is required to promote healing and evaluate the extent of previous healing. Performed by:  Gena Aguilera MD    Consent obtained: Yes    Time out taken:  Yes    Pain Control: none       Debridement:Excisional Debridement    Using curette the wound(s) was/were sharply debrided down through and including the removal of muscle/fascia. Devitalized Tissue Debrided:  biofilm and necrotic/eschar    Pre Debridement Measurements:  Are located in the Wound Documentation Flow Sheet    All active wounds listed below with today's date are evaluated  Wound(s)    debrided this date include # : 2     Post  Debridement Measurements:  Wound 09/17/20 Toe (Comment  which one) Left #2 Left Second Toe (Active)   Wound Image   09/17/20 0849   Dressing Status New dressing applied 10/29/20 0839   Wound Cleansed Soap and water 11/12/20 0821   Wound Length (cm) 0.3 cm 11/12/20 0821   Wound Width (cm) 0.2 cm 11/12/20 0821   Wound Depth (cm) 0.1 cm 11/12/20 0821   Wound Surface Area (cm^2) 0.06 cm^2 11/12/20 0821   Change in Wound Size % (l*w) 98 11/12/20 0821   Wound Volume (cm^3) 0.01 cm^3 11/12/20 0821   Wound Healing % 97 11/12/20 0821   Post-Procedure Length (cm) 0.3 cm 11/12/20 0856   Post-Procedure Width (cm) 0.2 cm 11/12/20 0856   Post-Procedure Depth (cm) 0.01 cm 11/12/20 0856   Post-Procedure Surface Area (cm^2) 0.06 cm^2 11/12/20 0856   Post-Procedure Volume (cm^3) 0 cm^3 11/12/20 0856   Distance Tunneling (cm) 0 cm 11/12/20 0821   Tunneling Position ___ O'Clock 0 11/12/20 0821   Undermining Starts ___ O'Clock 0 11/12/20 0821   Undermining Ends___ O'Clock 0 11/12/20 0821   Undermining Maxium Distance (cm) 0 11/12/20 0821   Drainage Amount Moderate 11/12/20 0821   Drainage Description Clear 11/12/20 0821   Odor None 11/12/20 0821   Magdalena-wound Assessment Intact 11/12/20 0821   Margins Defined edges 11/12/20 0821   Wound Thickness Description not for Pressure Injury Full thickness 11/12/20 0821   Number of days: 56       Percent of Wound(s) Debrided: approximately 100%    Total Surface Area Debrided:  0.06 sq cm     Bleeding:  Minimal    Hemostasis Achieved:  by pressure    Procedural Pain:  0  / 10     Post Procedural Pain:  0 / 10     Response to treatment:  Well tolerated by patient.      Wound is has slightly

## 2020-11-13 RX ORDER — DILTIAZEM HYDROCHLORIDE 180 MG/1
180 CAPSULE, COATED, EXTENDED RELEASE ORAL DAILY
Qty: 180 CAPSULE | Refills: 1 | Status: SHIPPED | OUTPATIENT
Start: 2020-11-13 | End: 2021-03-05 | Stop reason: SDUPTHER

## 2020-11-13 RX ORDER — DILTIAZEM HYDROCHLORIDE 180 MG/1
360 CAPSULE, EXTENDED RELEASE ORAL DAILY
Qty: 180 CAPSULE | Refills: 1 | Status: SHIPPED
Start: 2020-11-13 | End: 2020-11-13 | Stop reason: SDUPTHER

## 2020-11-19 ENCOUNTER — HOSPITAL ENCOUNTER (OUTPATIENT)
Dept: WOUND CARE | Age: 65
Discharge: HOME | End: 2020-11-19
Payer: MEDICARE

## 2020-11-19 VITALS
RESPIRATION RATE: 18 BRPM | SYSTOLIC BLOOD PRESSURE: 127 MMHG | DIASTOLIC BLOOD PRESSURE: 54 MMHG | HEART RATE: 85 BPM | TEMPERATURE: 98.7 F

## 2020-11-19 PROCEDURE — 11042 DBRDMT SUBQ TIS 1ST 20SQCM/<: CPT

## 2020-11-19 RX ORDER — GINSENG 100 MG
CAPSULE ORAL ONCE
Status: CANCELLED | OUTPATIENT
Start: 2020-11-19

## 2020-11-19 RX ORDER — LIDOCAINE HYDROCHLORIDE 20 MG/ML
JELLY TOPICAL ONCE
Status: CANCELLED | OUTPATIENT
Start: 2020-11-19

## 2020-11-19 RX ORDER — LIDOCAINE 50 MG/G
OINTMENT TOPICAL ONCE
Status: DISCONTINUED | OUTPATIENT
Start: 2020-11-19 | End: 2020-11-20 | Stop reason: HOSPADM

## 2020-11-19 RX ORDER — BETAMETHASONE DIPROPIONATE 0.05 %
OINTMENT (GRAM) TOPICAL ONCE
Status: CANCELLED | OUTPATIENT
Start: 2020-11-19

## 2020-11-19 RX ORDER — GENTAMICIN SULFATE 1 MG/G
OINTMENT TOPICAL ONCE
Status: CANCELLED | OUTPATIENT
Start: 2020-11-19

## 2020-11-19 RX ORDER — BACITRACIN ZINC AND POLYMYXIN B SULFATE 500; 1000 [USP'U]/G; [USP'U]/G
OINTMENT TOPICAL ONCE
Status: CANCELLED | OUTPATIENT
Start: 2020-11-19

## 2020-11-19 RX ORDER — LIDOCAINE HYDROCHLORIDE 40 MG/ML
SOLUTION TOPICAL ONCE
Status: CANCELLED | OUTPATIENT
Start: 2020-11-19

## 2020-11-19 RX ORDER — BACITRACIN, NEOMYCIN, POLYMYXIN B 400; 3.5; 5 [USP'U]/G; MG/G; [USP'U]/G
OINTMENT TOPICAL ONCE
Status: CANCELLED | OUTPATIENT
Start: 2020-11-19

## 2020-11-19 RX ORDER — LIDOCAINE 40 MG/G
CREAM TOPICAL ONCE
Status: CANCELLED | OUTPATIENT
Start: 2020-11-19

## 2020-11-19 RX ORDER — CLOBETASOL PROPIONATE 0.5 MG/G
OINTMENT TOPICAL ONCE
Status: CANCELLED | OUTPATIENT
Start: 2020-11-19

## 2020-11-19 RX ORDER — LIDOCAINE 50 MG/G
OINTMENT TOPICAL ONCE
Status: CANCELLED | OUTPATIENT
Start: 2020-11-19

## 2020-11-19 NOTE — PROGRESS NOTES
Wound Care Center Progress Note with Procedure Note      Aleksandr Norton  AGE: 77 y.o. GENDER: male  : 1955  EPISODE DATE:  2020     Subjective:     Chief Complaint   Patient presents with    Wound Check     left foot          HISTORY of PRESENT ILLNESS      Prachi Goyal is a 77 y.o. male who presents today for wound evaluation of Chronic traumatic wound(s) of toes left, 2nd toe. The wound is of marked severity. The underlying cause of the wound is trauma. Dog bite  Wound Pain Timing/Severity: none  Quality of pain: N/A  Severity of pain:  0 / 10   Modifying Factors: diabetes, obesity and anticoagulation therapy  Associated Signs/Symptoms: none        PAST MEDICAL HISTORY        Diagnosis Date    Arrhythmia     Atrial fibrillation ([address]     Carotid Doppler 2018    Mild (0-49%) disease of the Bilateral Internal carotid artery. No hemodynamically significant stenosis noted. Normal vertebral flow.  Diabetes mellitus ([address]     H/O echocardiogram 2020    EF 50%. Sclerotic aortic valve with mild stenosis. 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 No reversible myocardial ischemia seen. Uneventful pharmacological     History of echocardiogram 2017    Ejection fraction is visually estimated at 55%. mildly Increased LVOT velocity. Aortic valve leaflets are somewhat thickened. Aortic valve appears tricuspid. Trivial aortic regurgitation is noted. Mitral annular calcification is present. Mild calcification of the anterior leaflet of the mitral valve.     Hyperlipidemia     Hypertension     WD-Dog bite of toe left 2nd toe 2020    WD-Open wound of left great toe due to dog bite 2020       PAST SURGICAL HISTORY    Past Surgical History:   Procedure Laterality Date    OTHER SURGICAL HISTORY  2018    Atrial Fib cardiac Ablation FAMILY HISTORY    Family History   Problem Relation Age of Onset    Heart Disease Father        SOCIAL HISTORY    Social History     Tobacco Use    Smoking status: Never Smoker    Smokeless tobacco: Never Used   Substance Use Topics    Alcohol use: Yes     Comment: caffeine 2 cups of coffee a day    Drug use: No       ALLERGIES    Allergies   Allergen Reactions    Morphine Rash       MEDICATIONS    Current Outpatient Medications on File Prior to Encounter   Medication Sig Dispense Refill    dilTIAZem (CARDIZEM CD) 180 MG extended release capsule Take 1 capsule by mouth daily 180 capsule 1    lisinopril (PRINIVIL;ZESTRIL) 40 MG tablet TAKE ONE TABLET BY MOUTH DAILY 90 tablet 0    metFORMIN (GLUCOPHAGE-XR) 500 MG extended release tablet TAKE FOUR TABLETS BY MOUTH DAILY WITH BREAKFAST 360 tablet 0    levothyroxine (SYNTHROID) 100 MCG tablet TAKE ONE TABLET BY MOUTH EVERY EVENING 90 tablet 0    gabapentin (NEURONTIN) 300 MG capsule Take 1 capsule by mouth 3 times daily for 30 days.  90 capsule 2    rivaroxaban (XARELTO) 20 MG TABS tablet Take 1 tablet by mouth daily (with breakfast) 30 tablet 5    hydroCHLOROthiazide (HYDRODIURIL) 25 MG tablet TAKE ONE TABLET BY MOUTH EVERY EVENING 90 tablet 1    glimepiride (AMARYL) 4 MG tablet TAKE ONE TABLET BY MOUTH TWICE A DAY WITH MEALS 180 tablet 1    PARoxetine (PAXIL) 30 MG tablet TAKE ONE TABLET BY MOUTH EVERY MORNING 30 tablet 5    celecoxib (CELEBREX) 200 MG capsule Take 1 capsule by mouth daily 90 capsule 1    atorvastatin (LIPITOR) 40 MG tablet Take 1 tablet by mouth daily 90 tablet 1    insulin detemir (LEVEMIR FLEXTOUCH) 100 UNIT/ML injection pen Inject 10 Units into the skin nightly 5 pen 3    insulin detemir (LEVEMIR) 100 UNIT/ML injection vial Inject 10 Units into the skin nightly 3 vial 1    tiZANidine (ZANAFLEX) 2 MG tablet Take 1 tablet by mouth 3 times daily as needed (muscle spasm) 30 tablet 0    aspirin 81 MG tablet Take 81 mg by mouth Lidocaine Ointment Topical     Debridement:Excisional Debridement    Using curette the wound(s) was/were sharply debrided down through and including the removal of subcutaneous tissue.         Devitalized Tissue Debrided:  biofilm and necrotic/eschar    Pre Debridement Measurements:  Are located in the Wound Documentation Flow Sheet    All active wounds listed below with today's date are evaluated  Wound(s)    debrided this date include # : 2     Post  Debridement Measurements:  Wound 09/17/20 Toe (Comment  which one) Left #2 Left Second Toe (Active)   Wound Image   11/19/20 0838   Dressing Status New dressing applied 10/29/20 0839   Wound Cleansed Soap and water 11/19/20 0838   Wound Length (cm) 1 cm 11/19/20 0838   Wound Width (cm) 0.9 cm 11/19/20 0838   Wound Depth (cm) 0.1 cm 11/19/20 0838   Wound Surface Area (cm^2) 0.9 cm^2 11/19/20 0838   Change in Wound Size % (l*w) 70 11/19/20 0838   Wound Volume (cm^3) 0.09 cm^3 11/19/20 0838   Wound Healing % 70 11/19/20 0838   Post-Procedure Length (cm) 1 cm 11/19/20 0901   Post-Procedure Width (cm) 0.9 cm 11/19/20 0901   Post-Procedure Depth (cm) 0.1 cm 11/19/20 0901   Post-Procedure Surface Area (cm^2) 0.9 cm^2 11/19/20 0901   Post-Procedure Volume (cm^3) 0.09 cm^3 11/19/20 0901   Distance Tunneling (cm) 0 cm 11/19/20 0838   Tunneling Position ___ O'Clock 0 11/19/20 0838   Undermining Starts ___ O'Clock 0 11/19/20 0838   Undermining Ends___ O'Clock 0 11/19/20 0838   Undermining Maxium Distance (cm) 0 11/19/20 0838   Drainage Amount Moderate 11/19/20 0838   Drainage Description Clear 11/19/20 0838   Odor None 11/19/20 0838   Magdalena-wound Assessment Intact 11/19/20 0838   Margins Defined edges 11/19/20 0838   Wound Thickness Description not for Pressure Injury Full thickness 11/19/20 0838   Number of days: 63       Percent of Wound(s) Debrided: approximately 100%    Total Surface Area Debrided:  0.9 sq cm     Bleeding:  Minimal    Hemostasis Achieved:  by pressure    Procedural Pain:  0  / 10     Post Procedural Pain:  0 / 10     Response to treatment:  Well tolerated by patient. Wound is has slightly improved    Plan:       Discharge Instructions         PHYSICIAN ORDERS AND DISCHARGE INSTRUCTIONS     NOTE: Upon discharge from the [address], you will receive a patient experience survey. We would be grateful if you would take the time to fill this survey out.     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              Wash hands with soap and water before and after dressing changes.               GQEDI to applying a clean dressing, cleanse wound with normal saline, wound cleanser, or mild soap and water.               Ask the physician or nurse before getting the wound(s) wet in a shower     Daily Wound management:              Keep weight off wounds and reposition every 2 hours.              Avoid standing for long periods of time.              Apply wraps/stockings in AM and remove at bedtime.              If swelling is present, elevate legs to the level of the heart or above for 30 minutes 4-5 times a day and/or when sitting.                                      When taking antibiotics take entire prescription as ordered by physician do not stop taking until medicine is all gone.                                                           Orders for this week:     20     Big Toe and Second Toe

## 2020-11-30 RX ORDER — CELECOXIB 200 MG/1
CAPSULE ORAL
Qty: 30 CAPSULE | Refills: 0 | OUTPATIENT
Start: 2020-11-30

## 2020-12-03 ENCOUNTER — HOSPITAL ENCOUNTER (OUTPATIENT)
Dept: WOUND CARE | Age: 65
Discharge: HOME | End: 2020-12-03
Payer: MEDICARE

## 2020-12-03 VITALS — DIASTOLIC BLOOD PRESSURE: 54 MMHG | RESPIRATION RATE: 20 BRPM | TEMPERATURE: 98 F | SYSTOLIC BLOOD PRESSURE: 136 MMHG

## 2020-12-03 PROCEDURE — 11042 DBRDMT SUBQ TIS 1ST 20SQCM/<: CPT

## 2020-12-03 RX ORDER — GENTAMICIN SULFATE 1 MG/G
OINTMENT TOPICAL ONCE
Status: CANCELLED | OUTPATIENT
Start: 2020-12-03

## 2020-12-03 RX ORDER — LIDOCAINE HYDROCHLORIDE 40 MG/ML
SOLUTION TOPICAL ONCE
Status: CANCELLED | OUTPATIENT
Start: 2020-12-03

## 2020-12-03 RX ORDER — LIDOCAINE HYDROCHLORIDE 20 MG/ML
JELLY TOPICAL ONCE
Status: CANCELLED | OUTPATIENT
Start: 2020-12-03

## 2020-12-03 RX ORDER — LIDOCAINE 40 MG/G
CREAM TOPICAL ONCE
Status: CANCELLED | OUTPATIENT
Start: 2020-12-03

## 2020-12-03 RX ORDER — BACITRACIN, NEOMYCIN, POLYMYXIN B 400; 3.5; 5 [USP'U]/G; MG/G; [USP'U]/G
OINTMENT TOPICAL ONCE
Status: CANCELLED | OUTPATIENT
Start: 2020-12-03

## 2020-12-03 RX ORDER — LIDOCAINE HYDROCHLORIDE 40 MG/ML
SOLUTION TOPICAL ONCE
Status: DISCONTINUED | OUTPATIENT
Start: 2020-12-03 | End: 2020-12-04 | Stop reason: HOSPADM

## 2020-12-03 RX ORDER — LIDOCAINE 50 MG/G
OINTMENT TOPICAL ONCE
Status: CANCELLED | OUTPATIENT
Start: 2020-12-03

## 2020-12-03 RX ORDER — BACITRACIN ZINC AND POLYMYXIN B SULFATE 500; 1000 [USP'U]/G; [USP'U]/G
OINTMENT TOPICAL ONCE
Status: CANCELLED | OUTPATIENT
Start: 2020-12-03

## 2020-12-03 RX ORDER — GINSENG 100 MG
CAPSULE ORAL ONCE
Status: CANCELLED | OUTPATIENT
Start: 2020-12-03

## 2020-12-03 RX ORDER — CLOBETASOL PROPIONATE 0.5 MG/G
OINTMENT TOPICAL ONCE
Status: CANCELLED | OUTPATIENT
Start: 2020-12-03

## 2020-12-03 RX ORDER — BETAMETHASONE DIPROPIONATE 0.05 %
OINTMENT (GRAM) TOPICAL ONCE
Status: CANCELLED | OUTPATIENT
Start: 2020-12-03

## 2020-12-03 NOTE — PROGRESS NOTES
Wound Care Center Progress Note with Procedure Note      Kalpana Norton  AGE: 77 y.o. GENDER: male  : 1955  EPISODE DATE:  12/3/2020     Subjective:     Chief Complaint   Patient presents with    Wound Check     left foot         HISTORY of PRESENT ILLNESS      Joslyn Kelly is a 77 y.o. male who presents today for wound evaluation of Chronic traumatic wound(s) of toes left, 2nd toe. The wound is of moderate severity. The underlying cause of the wound is trauma dog bite. Wound Pain Timing/Severity: none  Quality of pain: N/A  Severity of pain:  0 / 10   Modifying Factors: diabetes, obesity and anticoagulation therapy  Associated Signs/Symptoms: none        PAST MEDICAL HISTORY        Diagnosis Date    Arrhythmia     Atrial fibrillation ([address]     Carotid Doppler 2018    Mild (0-49%) disease of the Bilateral Internal carotid artery. No hemodynamically significant stenosis noted. Normal vertebral flow.  Diabetes mellitus ([address]     H/O echocardiogram 2020    EF 50%. Sclerotic aortic valve with mild stenosis. 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 No reversible myocardial ischemia seen. Uneventful pharmacological     History of echocardiogram 2017    Ejection fraction is visually estimated at 55%. mildly Increased LVOT velocity. Aortic valve leaflets are somewhat thickened. Aortic valve appears tricuspid. Trivial aortic regurgitation is noted. Mitral annular calcification is present. Mild calcification of the anterior leaflet of the mitral valve.     Hyperlipidemia     Hypertension     WD-Dog bite of toe left 2nd toe 2020    WD-Open wound of left great toe due to dog bite 2020       PAST SURGICAL HISTORY    Past Surgical History:   Procedure Laterality Date    OTHER SURGICAL HISTORY  2018    Atrial Fib cardiac Ablation FAMILY HISTORY    Family History   Problem Relation Age of Onset    Heart Disease Father        SOCIAL HISTORY    Social History     Tobacco Use    Smoking status: Never Smoker    Smokeless tobacco: Never Used   Substance Use Topics    Alcohol use: Yes     Comment: caffeine 2 cups of coffee a day    Drug use: No       ALLERGIES    Allergies   Allergen Reactions    Morphine Rash       MEDICATIONS    Current Outpatient Medications on File Prior to Encounter   Medication Sig Dispense Refill    dilTIAZem (CARDIZEM CD) 180 MG extended release capsule Take 1 capsule by mouth daily 180 capsule 1    lisinopril (PRINIVIL;ZESTRIL) 40 MG tablet TAKE ONE TABLET BY MOUTH DAILY 90 tablet 0    metFORMIN (GLUCOPHAGE-XR) 500 MG extended release tablet TAKE FOUR TABLETS BY MOUTH DAILY WITH BREAKFAST 360 tablet 0    levothyroxine (SYNTHROID) 100 MCG tablet TAKE ONE TABLET BY MOUTH EVERY EVENING 90 tablet 0    rivaroxaban (XARELTO) 20 MG TABS tablet Take 1 tablet by mouth daily (with breakfast) 30 tablet 5    hydroCHLOROthiazide (HYDRODIURIL) 25 MG tablet TAKE ONE TABLET BY MOUTH EVERY EVENING 90 tablet 1    glimepiride (AMARYL) 4 MG tablet TAKE ONE TABLET BY MOUTH TWICE A DAY WITH MEALS 180 tablet 1    PARoxetine (PAXIL) 30 MG tablet TAKE ONE TABLET BY MOUTH EVERY MORNING 30 tablet 5    celecoxib (CELEBREX) 200 MG capsule Take 1 capsule by mouth daily 90 capsule 1    atorvastatin (LIPITOR) 40 MG tablet Take 1 tablet by mouth daily 90 tablet 1    insulin detemir (LEVEMIR FLEXTOUCH) 100 UNIT/ML injection pen Inject 10 Units into the skin nightly 5 pen 3    insulin detemir (LEVEMIR) 100 UNIT/ML injection vial Inject 10 Units into the skin nightly 3 vial 1    tiZANidine (ZANAFLEX) 2 MG tablet Take 1 tablet by mouth 3 times daily as needed (muscle spasm) 30 tablet 0    aspirin 81 MG tablet Take 81 mg by mouth daily      terbinafine (LAMISIL) 1 % cream Apply topically 2 times daily Apply topically 2 times daily.  Blood Glucose Monitoring Suppl (ONETOUCH VERIO IQ SYSTEM) W/DEVICE KIT       ONETOUCH VERIO strip       BD PEN NEEDLE RADU U/F 32G X 4 MM MISC       ONETOUCH DELICA LANCETS FINE MISC       gabapentin (NEURONTIN) 300 MG capsule Take 1 capsule by mouth 3 times daily for 30 days. 90 capsule 2    pioglitazone (ACTOS) 15 MG tablet TAKE ONE TABLET BY MOUTH EVERY EVENING 16 tablet 1     No current facility-administered medications on file prior to encounter. REVIEW OF SYSTEMS      Constitutional: Negative for systemic symptoms including fever, chills and malaise. Objective:      BP (!) 136/54   Temp 98 °F (36.7 °C) (Temporal)   Resp 20     PHYSICAL EXAM  Constitutional: Negative for systemic symptoms including fever, chills and malaise. General: The patient is in no acute distress. Mental status:  Patient is appropriate, is  oriented to place and plan of care. Dermatologic exam: Visual inspection of the periwound reveals the skin to be normal in turgor and texture  Wound exam: see wound description below in procedure note      Assessment:     Problem List Items Addressed This Visit     WD-Dog bite of toe left 2nd toe - Primary    Relevant Medications    lidocaine (XYLOCAINE) 4 % external solution    Other Relevant Orders    Supply: Wound Cleanser    Supply: Wound Dressings    Supply: Cover and Secure    WD-Open wound of left great toe due to dog bite    Relevant Medications    lidocaine (XYLOCAINE) 4 % external solution    Other Relevant Orders    Supply: Wound Cleanser    Supply: Wound Dressings    Supply: Cover and Secure        Procedure Note    Indications:  Based on my examination of this patient's wound(s) today, sharp excision into necrotic subcutaneous tissue is required to promote healing and evaluate the extent of previous healing. Performed by:  Eugene Sheriff MD    Consent obtained: Yes    Time out taken:  Yes    Pain Control: none       Debridement:Excisional Debridement    Using curette the wound(s) was/were sharply debrided down through and including the removal of subcutaneous tissue.         Devitalized Tissue Debrided:  biofilm and necrotic/eschar    Pre Debridement Measurements:  Are located in the Wound Documentation Flow Sheet    All active wounds listed below with today's date are evaluated  Wound(s)    debrided this date include # : 2     Post  Debridement Measurements:  Wound 09/17/20 Toe (Comment  which one) Left #2 Left Second Toe (Active)   Wound Image   11/19/20 0838   Dressing Status New dressing applied 11/19/20 0940   Wound Cleansed Soap and water 12/03/20 0822   Wound Length (cm) 0.5 cm 12/03/20 0822   Wound Width (cm) 0.4 cm 12/03/20 0822   Wound Depth (cm) 0.1 cm 12/03/20 0822   Wound Surface Area (cm^2) 0.2 cm^2 12/03/20 0822   Change in Wound Size % (l*w) 93.33 12/03/20 0822   Wound Volume (cm^3) 0.02 cm^3 12/03/20 0822   Wound Healing % 93 12/03/20 0822   Post-Procedure Length (cm) 0.5 cm 12/03/20 0838   Post-Procedure Width (cm) 0.4 cm 12/03/20 0838   Post-Procedure Depth (cm) 0.1 cm 12/03/20 0838   Post-Procedure Surface Area (cm^2) 0.2 cm^2 12/03/20 0838   Post-Procedure Volume (cm^3) 0.02 cm^3 12/03/20 0838   Distance Tunneling (cm) 0 cm 12/03/20 0822   Tunneling Position ___ O'Clock 0 12/03/20 0822   Undermining Starts ___ O'Clock 0 12/03/20 0822   Undermining Ends___ O'Clock 0 12/03/20 0822   Undermining Maxium Distance (cm) 0 12/03/20 0822   Drainage Amount Moderate 12/03/20 0822   Drainage Description Clear 12/03/20 0822   Odor None 12/03/20 0822   Magdalena-wound Assessment Intact 12/03/20 0822   Margins Defined edges 12/03/20 0822   Wound Thickness Description not for Pressure Injury Full thickness 12/03/20 0822   Number of days: 77       Percent of Wound(s) Debrided: approximately 100%    Total Surface Area Debrided:  0.2 sq cm     Bleeding:  Minimal    Hemostasis Achieved:  by pressure    Procedural Pain:  0  / 10     Post Procedural Pain: hydroferablue over wound bed  Wrap with conform  Secure With tape      Change Daily     Follow up with Dr Kia Collado in the wound care center  Call  for any questions or concerns.   Date__________   Time____________          Treatment Note      Written Patient Dismissal Instructions Given            Electronically signed by Shira Mayo MD on 12/3/2020 at 8:56 AM

## 2020-12-04 RX ORDER — ATORVASTATIN CALCIUM 40 MG/1
TABLET, FILM COATED ORAL
Qty: 90 TABLET | Refills: 1 | Status: SHIPPED | OUTPATIENT
Start: 2020-12-04 | End: 2021-03-05 | Stop reason: SDUPTHER

## 2020-12-10 ENCOUNTER — HOSPITAL ENCOUNTER (OUTPATIENT)
Dept: WOUND CARE | Age: 65
Discharge: HOME | End: 2020-12-10
Payer: MEDICARE

## 2020-12-10 VITALS
DIASTOLIC BLOOD PRESSURE: 60 MMHG | TEMPERATURE: 98.3 F | HEART RATE: 82 BPM | SYSTOLIC BLOOD PRESSURE: 144 MMHG | RESPIRATION RATE: 20 BRPM

## 2020-12-10 PROCEDURE — 11042 DBRDMT SUBQ TIS 1ST 20SQCM/<: CPT

## 2020-12-10 RX ORDER — GINSENG 100 MG
CAPSULE ORAL ONCE
Status: CANCELLED | OUTPATIENT
Start: 2020-12-10

## 2020-12-10 RX ORDER — LIDOCAINE HYDROCHLORIDE 40 MG/ML
SOLUTION TOPICAL ONCE
Status: CANCELLED | OUTPATIENT
Start: 2020-12-10

## 2020-12-10 RX ORDER — LIDOCAINE HYDROCHLORIDE 20 MG/ML
JELLY TOPICAL ONCE
Status: CANCELLED | OUTPATIENT
Start: 2020-12-10

## 2020-12-10 RX ORDER — LIDOCAINE 50 MG/G
OINTMENT TOPICAL ONCE
Status: DISCONTINUED | OUTPATIENT
Start: 2020-12-10 | End: 2020-12-11 | Stop reason: HOSPADM

## 2020-12-10 RX ORDER — CLOBETASOL PROPIONATE 0.5 MG/G
OINTMENT TOPICAL ONCE
Status: CANCELLED | OUTPATIENT
Start: 2020-12-10

## 2020-12-10 RX ORDER — BACITRACIN, NEOMYCIN, POLYMYXIN B 400; 3.5; 5 [USP'U]/G; MG/G; [USP'U]/G
OINTMENT TOPICAL ONCE
Status: CANCELLED | OUTPATIENT
Start: 2020-12-10

## 2020-12-10 RX ORDER — GENTAMICIN SULFATE 1 MG/G
OINTMENT TOPICAL ONCE
Status: CANCELLED | OUTPATIENT
Start: 2020-12-10

## 2020-12-10 RX ORDER — BACITRACIN ZINC AND POLYMYXIN B SULFATE 500; 1000 [USP'U]/G; [USP'U]/G
OINTMENT TOPICAL ONCE
Status: CANCELLED | OUTPATIENT
Start: 2020-12-10

## 2020-12-10 RX ORDER — LIDOCAINE 40 MG/G
CREAM TOPICAL ONCE
Status: CANCELLED | OUTPATIENT
Start: 2020-12-10

## 2020-12-10 RX ORDER — BETAMETHASONE DIPROPIONATE 0.05 %
OINTMENT (GRAM) TOPICAL ONCE
Status: CANCELLED | OUTPATIENT
Start: 2020-12-10

## 2020-12-10 RX ORDER — LIDOCAINE 50 MG/G
OINTMENT TOPICAL ONCE
Status: CANCELLED | OUTPATIENT
Start: 2020-12-10

## 2020-12-10 NOTE — PROGRESS NOTES
History   Problem Relation Age of Onset    Heart Disease Father        SOCIAL HISTORY    Social History     Tobacco Use    Smoking status: Never Smoker    Smokeless tobacco: Never Used   Substance Use Topics    Alcohol use: Yes     Comment: caffeine 2 cups of coffee a day    Drug use: No       ALLERGIES    Allergies   Allergen Reactions    Morphine Rash       MEDICATIONS    Current Outpatient Medications on File Prior to Encounter   Medication Sig Dispense Refill    atorvastatin (LIPITOR) 40 MG tablet TAKE ONE TABLET BY MOUTH DAILY 90 tablet 1    dilTIAZem (CARDIZEM CD) 180 MG extended release capsule Take 1 capsule by mouth daily 180 capsule 1    lisinopril (PRINIVIL;ZESTRIL) 40 MG tablet TAKE ONE TABLET BY MOUTH DAILY 90 tablet 0    metFORMIN (GLUCOPHAGE-XR) 500 MG extended release tablet TAKE FOUR TABLETS BY MOUTH DAILY WITH BREAKFAST 360 tablet 0    levothyroxine (SYNTHROID) 100 MCG tablet TAKE ONE TABLET BY MOUTH EVERY EVENING 90 tablet 0    rivaroxaban (XARELTO) 20 MG TABS tablet Take 1 tablet by mouth daily (with breakfast) 30 tablet 5    hydroCHLOROthiazide (HYDRODIURIL) 25 MG tablet TAKE ONE TABLET BY MOUTH EVERY EVENING 90 tablet 1    glimepiride (AMARYL) 4 MG tablet TAKE ONE TABLET BY MOUTH TWICE A DAY WITH MEALS 180 tablet 1    PARoxetine (PAXIL) 30 MG tablet TAKE ONE TABLET BY MOUTH EVERY MORNING 30 tablet 5    celecoxib (CELEBREX) 200 MG capsule Take 1 capsule by mouth daily 90 capsule 1    insulin detemir (LEVEMIR FLEXTOUCH) 100 UNIT/ML injection pen Inject 10 Units into the skin nightly 5 pen 3    insulin detemir (LEVEMIR) 100 UNIT/ML injection vial Inject 10 Units into the skin nightly 3 vial 1    tiZANidine (ZANAFLEX) 2 MG tablet Take 1 tablet by mouth 3 times daily as needed (muscle spasm) 30 tablet 0    aspirin 81 MG tablet Take 81 mg by mouth daily      terbinafine (LAMISIL) 1 % cream Apply topically 2 times daily Apply topically 2 times daily.       Blood Glucose Monitoring Suppl (ONETOUCH VERIO IQ SYSTEM) W/DEVICE KIT       ONETOUCH VERIO strip       BD PEN NEEDLE RADU U/F 32G X 4 MM MISC       ONETOUCH DELICA LANCETS FINE MISC       gabapentin (NEURONTIN) 300 MG capsule Take 1 capsule by mouth 3 times daily for 30 days. 90 capsule 2    pioglitazone (ACTOS) 15 MG tablet TAKE ONE TABLET BY MOUTH EVERY EVENING 16 tablet 1     No current facility-administered medications on file prior to encounter. REVIEW OF SYSTEMS      Constitutional: Negative for systemic symptoms including fever, chills and malaise. Objective:      BP (!) 144/60   Pulse 82   Temp 98.3 °F (36.8 °C) (Temporal)   Resp 20     PHYSICAL EXAM  Constitutional: Negative for systemic symptoms including fever, chills and malaise. General: The patient is in no acute distress. Mental status:  Patient is appropriate, is  oriented to place and plan of care. Dermatologic exam: Visual inspection of the periwound reveals the skin to be dry  Wound exam: see wound description below in procedure note      Assessment:     Problem List Items Addressed This Visit     WD-Dog bite of toe left 2nd toe - Primary    Relevant Medications    lidocaine (XYLOCAINE) 5 % ointment (Start on 12/10/2020  9:00 AM)    Other Relevant Orders    Supply: Wound Cleanser    Supply: Wound Dressings    Supply: Cover and Secure    WD-Open wound of left great toe due to dog bite    Relevant Medications    lidocaine (XYLOCAINE) 5 % ointment (Start on 12/10/2020  9:00 AM)    Other Relevant Orders    Supply: Wound Cleanser    Supply: Wound Dressings    Supply: Cover and Secure        Procedure Note    Indications:  Based on my examination of this patient's wound(s) today, sharp excision into necrotic subcutaneous tissue is required to promote healing and evaluate the extent of previous healing. Performed by:  Marjan Mcfarlane MD    Consent obtained: Yes    Time out taken:  Yes    Pain Control:  Anesthetic  Anesthetic: 4% Lidocaine Liquid Topical     Debridement:Excisional Debridement    Using curette the wound(s) was/were sharply debrided down through and including the removal of subcutaneous tissue.         Devitalized Tissue Debrided:  biofilm and necrotic/eschar    Pre Debridement Measurements:  Are located in the Wound Documentation Flow Sheet    All active wounds listed below with today's date are evaluated  Wound(s)    debrided this date include # : 2     Post  Debridement Measurements:  Wound 09/17/20 Toe (Comment  which one) Left #2 Left Second Toe (Active)   Wound Image   11/19/20 0838   Dressing Status New dressing applied 12/03/20 1030   Wound Cleansed Soap and water 12/10/20 0825   Wound Length (cm) 0.8 cm 12/10/20 0825   Wound Width (cm) 0.8 cm 12/10/20 0825   Wound Depth (cm) 0.1 cm 12/10/20 0825   Wound Surface Area (cm^2) 0.64 cm^2 12/10/20 0825   Change in Wound Size % (l*w) 78.67 12/10/20 0825   Wound Volume (cm^3) 0.06 cm^3 12/10/20 0825   Wound Healing % 80 12/10/20 0825   Post-Procedure Length (cm) 0.8 cm 12/10/20 0834   Post-Procedure Width (cm) 0.8 cm 12/10/20 0834   Post-Procedure Depth (cm) 0.1 cm 12/10/20 0834   Post-Procedure Surface Area (cm^2) 0.64 cm^2 12/10/20 0834   Post-Procedure Volume (cm^3) 0.06 cm^3 12/10/20 0834   Distance Tunneling (cm) 0 cm 12/10/20 0825   Tunneling Position ___ O'Clock 0 12/10/20 0825   Undermining Starts ___ O'Clock 0 12/10/20 0825   Undermining Ends___ O'Clock 0 12/10/20 0825   Undermining Maxium Distance (cm) 0 12/03/20 0822   Drainage Amount Moderate 12/10/20 0825   Drainage Description Clear 12/10/20 0825   Odor None 12/10/20 0825   Magdalena-wound Assessment Intact 12/10/20 0825   Margins Defined edges 12/10/20 0825   Wound Thickness Description not for Pressure Injury Full thickness 12/10/20 0825   Number of days: 84       Percent of Wound(s) Debrided: approximately 100%    Total Surface Area Debrided:  0.64 sq cm     Bleeding:  Minimal    Hemostasis Achieved:  by pressure    Procedural Pain:  0  / 10     Post Procedural Pain:  0 / 10     Response to treatment:  Well tolerated by patient. Wound is is unchanged    Plan:       Discharge Instructions         Discharge Instructions           PHYSICIAN ORDERS AND DISCHARGE INSTRUCTIONS     NOTE: Upon discharge from the [address], you will receive a patient experience survey. We would be grateful if you would take the time to fill this survey out.     Wound care order history:                               Initial Visit: 20  ELOINA's   Right       Left               Date:              Imagin/10/20  Amputation of the 2nd digit at the level of the middle phalanx with associated soft tissue swelling      Cultures:  SENT ON 20  MRSA +              Antibiotics:                       Grafts:      Continuing wound care orders and information:                 Residence:                Continue home health care with:               Your wound-care supplies will be provided by:      Wound cleansing:               DK not scrub or use excessive force.              Wash hands with soap and water before and after dressing changes.               KHPGN to applying a clean dressing, cleanse wound with normal saline, wound cleanser, or mild soap and water.               Ask the physician or nurse before getting the wound(s) wet in a shower     Daily Wound management:              Keep weight off wounds and reposition every 2 hours.              Avoid standing for long periods of time.              Apply wraps/stockings in AM and remove at bedtime.              If swelling is present, elevate legs to the level of the heart or above for 30 minutes 4-5 times a day and/or when sitting.                                      When taking antibiotics take entire prescription as ordered by physician do not stop taking until medicine is all gone.                                                           Orders for this week:     12/10/20     Big Toe and Second Toe of Left Foot :Wash with soap and water. Pat dry. Place NS damp hydroferablue over wound bed  Wrap with conform  Secure With tape      Change Daily     Follow up with Dr Tanya Vanegas in the wound care center  Call  for any questions or concerns.   Date__________   Time__________          Treatment Note      Written Patient Dismissal Instructions Given            Electronically signed by Nadir Velazquez MD on 12/10/2020 at 8:44 AM

## 2020-12-17 ENCOUNTER — HOSPITAL ENCOUNTER (OUTPATIENT)
Dept: WOUND CARE | Age: 65
Discharge: HOME | End: 2020-12-17
Payer: MEDICARE

## 2020-12-17 VITALS
DIASTOLIC BLOOD PRESSURE: 69 MMHG | SYSTOLIC BLOOD PRESSURE: 140 MMHG | TEMPERATURE: 97 F | HEART RATE: 83 BPM | RESPIRATION RATE: 20 BRPM

## 2020-12-17 PROCEDURE — 99212 OFFICE O/P EST SF 10 MIN: CPT

## 2020-12-17 RX ORDER — BACITRACIN ZINC AND POLYMYXIN B SULFATE 500; 1000 [USP'U]/G; [USP'U]/G
OINTMENT TOPICAL ONCE
Status: CANCELLED | OUTPATIENT
Start: 2020-12-17 | End: 2020-12-17

## 2020-12-17 RX ORDER — LIDOCAINE 40 MG/G
CREAM TOPICAL ONCE
Status: CANCELLED | OUTPATIENT
Start: 2020-12-17 | End: 2020-12-17

## 2020-12-17 RX ORDER — LIDOCAINE HYDROCHLORIDE 20 MG/ML
JELLY TOPICAL ONCE
Status: CANCELLED | OUTPATIENT
Start: 2020-12-17 | End: 2020-12-17

## 2020-12-17 RX ORDER — BACITRACIN, NEOMYCIN, POLYMYXIN B 400; 3.5; 5 [USP'U]/G; MG/G; [USP'U]/G
OINTMENT TOPICAL ONCE
Status: CANCELLED | OUTPATIENT
Start: 2020-12-17 | End: 2020-12-17

## 2020-12-17 RX ORDER — GENTAMICIN SULFATE 1 MG/G
OINTMENT TOPICAL ONCE
Status: CANCELLED | OUTPATIENT
Start: 2020-12-17 | End: 2020-12-17

## 2020-12-17 RX ORDER — LIDOCAINE 50 MG/G
OINTMENT TOPICAL ONCE
Status: CANCELLED | OUTPATIENT
Start: 2020-12-17 | End: 2020-12-17

## 2020-12-17 RX ORDER — CLOBETASOL PROPIONATE 0.5 MG/G
OINTMENT TOPICAL ONCE
Status: CANCELLED | OUTPATIENT
Start: 2020-12-17 | End: 2020-12-17

## 2020-12-17 RX ORDER — LIDOCAINE HYDROCHLORIDE 40 MG/ML
SOLUTION TOPICAL ONCE
Status: CANCELLED | OUTPATIENT
Start: 2020-12-17 | End: 2020-12-17

## 2020-12-17 RX ORDER — BETAMETHASONE DIPROPIONATE 0.05 %
OINTMENT (GRAM) TOPICAL ONCE
Status: CANCELLED | OUTPATIENT
Start: 2020-12-17 | End: 2020-12-17

## 2020-12-17 RX ORDER — GINSENG 100 MG
CAPSULE ORAL ONCE
Status: CANCELLED | OUTPATIENT
Start: 2020-12-17 | End: 2020-12-17

## 2020-12-17 NOTE — PROGRESS NOTES
Wound Care Center Progress Note       Anayeli Norton  AGE: 77 y.o. GENDER: male  : 1955  TODAY'S DATE:  2020        Subjective:     Chief Complaint   Patient presents with    Wound Check         HISTORY of PRESENT ILLNESS     Alexandra Torres is a 77 y.o. male who presents today for wound evaluation of Chronic traumatic wound(s) of toes left, 2nd toe. The wound is of moderate severity. The underlying cause of the wound is trauma. Dog bite  Wound Pain Timing/Severity: none  Quality of pain: N/A  Severity of pain:  0 / 10   Modifying Factors: diabetes, obesity and anticoagulation therapy  Associated Signs/Symptoms: none        PAST MEDICAL HISTORY        Diagnosis Date    Arrhythmia     Atrial fibrillation ([address]     Carotid Doppler 2018    Mild (0-49%) disease of the Bilateral Internal carotid artery. No hemodynamically significant stenosis noted. Normal vertebral flow.  Diabetes mellitus ([address]     H/O echocardiogram 2020    EF 50%. Sclerotic aortic valve with mild stenosis. 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 No reversible myocardial ischemia seen. Uneventful pharmacological     History of echocardiogram 2017    Ejection fraction is visually estimated at 55%. mildly Increased LVOT velocity. Aortic valve leaflets are somewhat thickened. Aortic valve appears tricuspid. Trivial aortic regurgitation is noted. Mitral annular calcification is present. Mild calcification of the anterior leaflet of the mitral valve.     Hyperlipidemia     Hypertension     WD-Dog bite of toe left 2nd toe 2020    WD-Open wound of left great toe due to dog bite 2020       PAST SURGICAL HISTORY    Past Surgical History:   Procedure Laterality Date    OTHER SURGICAL HISTORY  2018    Atrial Fib cardiac Ablation       FAMILY HISTORY  Blood Glucose Monitoring Suppl (ONETOUCH VERIO IQ SYSTEM) W/DEVICE KIT       ONETOUCH VERIO strip       BD PEN NEEDLE RADU U/F 32G X 4 MM MISC       ONETOUCH DELICA LANCETS FINE MISC       gabapentin (NEURONTIN) 300 MG capsule Take 1 capsule by mouth 3 times daily for 30 days. 90 capsule 2    pioglitazone (ACTOS) 15 MG tablet TAKE ONE TABLET BY MOUTH EVERY EVENING 16 tablet 1     No current facility-administered medications on file prior to encounter. REVIEW OF SYSTEMS      Constitutional: Negative for systemic symptoms including fever, chills and malaise. Objective:      BP (!) 140/69   Pulse 83   Temp 97 °F (36.1 °C) (Temporal)   Resp 20     PHYSICAL EXAM  Constitutional: Negative for systemic symptoms including fever, chills and malaise. General: The patient is in no acute distress. Mental status:  Patient is appropriate, is  oriented to place and plan of care. Dermatologic exam: Visual inspection of the periwound reveals the skin to be normal in turgor and texture.   Wound exam:  see wound description below     All active wounds listed below with today's date are evaluated      Wound 09/17/20 Toe (Comment  which one) Left #2 Left Second Toe (Active)   Wound Image   11/19/20 0838   Dressing Status New dressing applied 12/17/20 0811   Wound Cleansed Soap and water 12/17/20 0811   Wound Length (cm) 1.2 cm 12/17/20 0811   Wound Width (cm) 0.6 cm 12/17/20 0811   Wound Depth (cm) 0.1 cm 12/17/20 0811   Wound Surface Area (cm^2) 0.72 cm^2 12/17/20 0811   Change in Wound Size % (l*w) 76 12/17/20 0811   Wound Volume (cm^3) 0.07 cm^3 12/17/20 0811   Wound Healing % 77 12/17/20 0811   Post-Procedure Length (cm) 0.8 cm 12/10/20 0834   Post-Procedure Width (cm) 0.8 cm 12/10/20 0834   Post-Procedure Depth (cm) 0.1 cm 12/10/20 0834   Post-Procedure Surface Area (cm^2) 0.64 cm^2 12/10/20 0834   Post-Procedure Volume (cm^3) 0.06 cm^3 12/10/20 0834   Distance Tunneling (cm) 0 cm 12/17/20 8801 Tunneling Position ___ O'Clock 0 20 0811   Undermining Starts ___ O'Clock 0 20 0811   Undermining Ends___ O'Clock 0 20 0811   Undermining Maxium Distance (cm) 0 20 0811   Drainage Amount Moderate 20 0811   Drainage Description Clear 20 0811   Odor None 20 08   Magdalena-wound Assessment Intact 20 08   Margins Defined edges 20 0811   Wound Thickness Description not for Pressure Injury Full thickness 20 0811   Number of days: 91       Assessment:       Problem List Items Addressed This Visit     WD-Dog bite of toe left 2nd toe - Primary    Relevant Orders    Supply: Wound Cleanser    Supply: Wound Dressings    Supply: Cover and Secure    WD-Open wound of left great toe due to dog bite    Relevant Orders    Supply: Wound Cleanser    Supply: Wound Dressings    Supply: Cover and Secure          Wound is has slightly improved      Plan:     Discharge Instructions         PHYSICIAN ORDERS AND DISCHARGE INSTRUCTIONS     NOTE: Upon discharge from the [address], you will receive a patient experience survey. We would be grateful if you would take the time to fill this survey out.     Wound care order history:                               Initial Visit: 20  ELOINA's   Right       Left               Date:              Imagin/10/20  Amputation of the 2nd digit at the level of the middle phalanx with associated soft tissue swelling      Cultures:  SENT ON 20  MRSA +              Antibiotics:                       Grafts:      Continuing wound care orders and information:                 Residence:                Continue home health care with:               Your wound-care supplies will be provided by:      Wound cleansing:               XH not scrub or use excessive force.              Wash hands with soap and water before and after dressing changes.             COVHI to applying a clean dressing, cleanse wound with normal saline, wound cleanser, or mild soap and water.               Ask the physician or nurse before getting the wound(s) wet in a shower     Daily Wound management:              Keep weight off wounds and reposition every 2 hours.              Avoid standing for long periods of time.              Apply wraps/stockings in AM and remove at bedtime.              If swelling is present, elevate legs to the level of the heart or above for 30 minutes 4-5 times a day and/or when sitting.                                      When taking antibiotics take entire prescription as ordered by physician do not stop taking until medicine is all gone.                                                           Orders for this week:     12/17/20     Big Toe and Second Toe of Left Foot :Wash with soap and water. Pat dry. Place NS damp hydroferablue over wound bed  Wrap with conform  Secure With tape      Change Daily     Follow up with Dr Lena Christensen [place] in the wound care center  Call  for any questions or concerns.   Date__________   Time__________          Treatment Note Wound 09/17/20 Toe (Comment  which one) Left #2 Left Second Toe-Dressing/Treatment: (betadine, conform)    Written Patient Dismissal Instructions Given            Electronically signed by Yahaira Payan MD on 12/17/2020 at 8:36 AM

## 2020-12-22 RX ORDER — CELECOXIB 200 MG/1
200 CAPSULE ORAL DAILY
Qty: 90 CAPSULE | Refills: 1 | Status: SHIPPED | OUTPATIENT
Start: 2020-12-22 | End: 2021-03-05 | Stop reason: SDUPTHER

## 2020-12-30 RX ORDER — DILTIAZEM HYDROCHLORIDE 180 MG/1
180 CAPSULE, COATED, EXTENDED RELEASE ORAL 2 TIMES DAILY
Qty: 180 CAPSULE | Refills: 1 | OUTPATIENT
Start: 2020-12-30

## 2020-12-31 ENCOUNTER — HOSPITAL ENCOUNTER (OUTPATIENT)
Dept: WOUND CARE | Age: 65
Discharge: HOME | End: 2020-12-31
Payer: MEDICARE

## 2020-12-31 VITALS
RESPIRATION RATE: 18 BRPM | DIASTOLIC BLOOD PRESSURE: 70 MMHG | SYSTOLIC BLOOD PRESSURE: 166 MMHG | HEART RATE: 98 BPM | TEMPERATURE: 96.7 F
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
## 2020-12-31 DIAGNOSIS — S91.159A DOG BITE OF TOE, INITIAL ENCOUNTER: Primary | ICD-10-CM

## 2020-12-31 DIAGNOSIS — S91.152A OPEN WOUND OF LEFT GREAT TOE DUE TO DOG BITE: ICD-10-CM

## 2020-12-31 PROCEDURE — 11042 DBRDMT SUBQ TIS 1ST 20SQCM/<: CPT | Performed by: NURSE PRACTITIONER

## 2020-12-31 PROCEDURE — 11719 TRIM NAIL(S) ANY NUMBER: CPT

## 2020-12-31 PROCEDURE — 11719 TRIM NAIL(S) ANY NUMBER: CPT | Performed by: NURSE PRACTITIONER

## 2020-12-31 PROCEDURE — 11042 DBRDMT SUBQ TIS 1ST 20SQCM/<: CPT

## 2020-12-31 RX ORDER — BACITRACIN ZINC AND POLYMYXIN B SULFATE 500; 1000 [USP'U]/G; [USP'U]/G
OINTMENT TOPICAL ONCE
Status: CANCELLED | OUTPATIENT
Start: 2020-12-31 | End: 2020-12-31

## 2020-12-31 RX ORDER — CLOBETASOL PROPIONATE 0.5 MG/G
OINTMENT TOPICAL ONCE
Status: CANCELLED | OUTPATIENT
Start: 2020-12-31 | End: 2020-12-31

## 2020-12-31 RX ORDER — BACITRACIN, NEOMYCIN, POLYMYXIN B 400; 3.5; 5 [USP'U]/G; MG/G; [USP'U]/G
OINTMENT TOPICAL ONCE
Status: CANCELLED | OUTPATIENT
Start: 2020-12-31 | End: 2020-12-31

## 2020-12-31 RX ORDER — BETAMETHASONE DIPROPIONATE 0.05 %
OINTMENT (GRAM) TOPICAL ONCE
Status: CANCELLED | OUTPATIENT
Start: 2020-12-31 | End: 2020-12-31

## 2020-12-31 RX ORDER — LIDOCAINE HYDROCHLORIDE 20 MG/ML
JELLY TOPICAL ONCE
Status: CANCELLED | OUTPATIENT
Start: 2020-12-31 | End: 2020-12-31

## 2020-12-31 RX ORDER — GENTAMICIN SULFATE 1 MG/G
OINTMENT TOPICAL ONCE
Status: CANCELLED | OUTPATIENT
Start: 2020-12-31 | End: 2020-12-31

## 2020-12-31 RX ORDER — LIDOCAINE 50 MG/G
OINTMENT TOPICAL ONCE
Status: CANCELLED | OUTPATIENT
Start: 2020-12-31 | End: 2020-12-31

## 2020-12-31 RX ORDER — GINSENG 100 MG
CAPSULE ORAL ONCE
Status: CANCELLED | OUTPATIENT
Start: 2020-12-31 | End: 2020-12-31

## 2020-12-31 RX ORDER — LIDOCAINE HYDROCHLORIDE 40 MG/ML
SOLUTION TOPICAL ONCE
Status: CANCELLED | OUTPATIENT
Start: 2020-12-31 | End: 2020-12-31

## 2020-12-31 RX ORDER — LIDOCAINE 40 MG/G
CREAM TOPICAL ONCE
Status: CANCELLED | OUTPATIENT
Start: 2020-12-31 | End: 2020-12-31

## 2020-12-31 ASSESSMENT — PAIN SCALES - GENERAL: PAINLEVEL_OUTOF10: 0

## 2021-01-08 RX ORDER — PIOGLITAZONEHYDROCHLORIDE 15 MG/1
TABLET ORAL
Qty: 90 TABLET | Refills: 0 | OUTPATIENT
Start: 2021-01-08 | End: 2021-02-07

## 2021-01-08 RX ORDER — PAROXETINE 30 MG/1
TABLET, FILM COATED ORAL
Qty: 30 TABLET | Refills: 4 | OUTPATIENT
Start: 2021-01-08

## 2021-01-11 RX ORDER — PIOGLITAZONEHYDROCHLORIDE 15 MG/1
TABLET ORAL
Qty: 30 TABLET | Refills: 0 | Status: SHIPPED | OUTPATIENT
Start: 2021-01-11 | End: 2021-02-15 | Stop reason: SDUPTHER

## 2021-01-11 RX ORDER — PAROXETINE 30 MG/1
TABLET, FILM COATED ORAL
Qty: 30 TABLET | Refills: 0 | Status: SHIPPED | OUTPATIENT
Start: 2021-01-11 | End: 2021-02-15 | Stop reason: SDUPTHER

## 2021-01-14 ENCOUNTER — HOSPITAL ENCOUNTER (OUTPATIENT)
Dept: WOUND CARE | Age: 66
Discharge: HOME | End: 2021-01-14
Payer: MEDICARE

## 2021-01-14 VITALS
SYSTOLIC BLOOD PRESSURE: 140 MMHG | DIASTOLIC BLOOD PRESSURE: 70 MMHG | RESPIRATION RATE: 18 BRPM | TEMPERATURE: 96.7 F | HEART RATE: 84 BPM
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
## 2021-01-14 DIAGNOSIS — S91.159A DOG BITE OF TOE, INITIAL ENCOUNTER: Primary | ICD-10-CM

## 2021-01-14 DIAGNOSIS — S91.152A OPEN WOUND OF LEFT GREAT TOE DUE TO DOG BITE: ICD-10-CM
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
## 2021-01-14 PROCEDURE — 99213 OFFICE O/P EST LOW 20 MIN: CPT

## 2021-01-14 RX ORDER — LIDOCAINE 40 MG/G
CREAM TOPICAL ONCE
Status: CANCELLED | OUTPATIENT
Start: 2021-01-14 | End: 2021-01-14

## 2021-01-14 RX ORDER — LIDOCAINE HYDROCHLORIDE 20 MG/ML
JELLY TOPICAL ONCE
Status: CANCELLED | OUTPATIENT
Start: 2021-01-14 | End: 2021-01-14

## 2021-01-14 RX ORDER — GINSENG 100 MG
CAPSULE ORAL ONCE
Status: CANCELLED | OUTPATIENT
Start: 2021-01-14 | End: 2021-01-14

## 2021-01-14 RX ORDER — CLOBETASOL PROPIONATE 0.5 MG/G
OINTMENT TOPICAL ONCE
Status: CANCELLED | OUTPATIENT
Start: 2021-01-14 | End: 2021-01-14

## 2021-01-14 RX ORDER — LIDOCAINE HYDROCHLORIDE 40 MG/ML
SOLUTION TOPICAL ONCE
Status: CANCELLED | OUTPATIENT
Start: 2021-01-14 | End: 2021-01-14

## 2021-01-14 RX ORDER — LIDOCAINE 50 MG/G
OINTMENT TOPICAL ONCE
Status: CANCELLED | OUTPATIENT
Start: 2021-01-14 | End: 2021-01-14

## 2021-01-14 RX ORDER — BACITRACIN, NEOMYCIN, POLYMYXIN B 400; 3.5; 5 [USP'U]/G; MG/G; [USP'U]/G
OINTMENT TOPICAL ONCE
Status: CANCELLED | OUTPATIENT
Start: 2021-01-14 | End: 2021-01-14

## 2021-01-14 RX ORDER — BETAMETHASONE DIPROPIONATE 0.05 %
OINTMENT (GRAM) TOPICAL ONCE
Status: CANCELLED | OUTPATIENT
Start: 2021-01-14 | End: 2021-01-14

## 2021-01-14 RX ORDER — BACITRACIN ZINC AND POLYMYXIN B SULFATE 500; 1000 [USP'U]/G; [USP'U]/G
OINTMENT TOPICAL ONCE
Status: CANCELLED | OUTPATIENT
Start: 2021-01-14 | End: 2021-01-14

## 2021-01-14 RX ORDER — GENTAMICIN SULFATE 1 MG/G
OINTMENT TOPICAL ONCE
Status: CANCELLED | OUTPATIENT
Start: 2021-01-14 | End: 2021-01-14

## 2021-01-14 NOTE — PROGRESS NOTES
Problem Relation Age of Onset    Heart Disease Father        SOCIAL HISTORY    Social History     Tobacco Use    Smoking status: Never Smoker    Smokeless tobacco: Never Used   Substance Use Topics    Alcohol use: Yes     Comment: caffeine 2 cups of coffee a day    Drug use: No       ALLERGIES    Allergies   Allergen Reactions    Morphine Rash       MEDICATIONS    Current Outpatient Medications on File Prior to Encounter   Medication Sig Dispense Refill    PARoxetine (PAXIL) 30 MG tablet TAKE ONE TABLET BY MOUTH EVERY MORNING 30 tablet 0    pioglitazone (ACTOS) 15 MG tablet TAKE ONE TABLET BY MOUTH EVERY EVENING 30 tablet 0    celecoxib (CELEBREX) 200 MG capsule Take 1 capsule by mouth daily 90 capsule 1    atorvastatin (LIPITOR) 40 MG tablet TAKE ONE TABLET BY MOUTH DAILY 90 tablet 1    dilTIAZem (CARDIZEM CD) 180 MG extended release capsule Take 1 capsule by mouth daily 180 capsule 1    lisinopril (PRINIVIL;ZESTRIL) 40 MG tablet TAKE ONE TABLET BY MOUTH DAILY 90 tablet 0    metFORMIN (GLUCOPHAGE-XR) 500 MG extended release tablet TAKE FOUR TABLETS BY MOUTH DAILY WITH BREAKFAST 360 tablet 0    levothyroxine (SYNTHROID) 100 MCG tablet TAKE ONE TABLET BY MOUTH EVERY EVENING 90 tablet 0    rivaroxaban (XARELTO) 20 MG TABS tablet Take 1 tablet by mouth daily (with breakfast) 30 tablet 5    hydroCHLOROthiazide (HYDRODIURIL) 25 MG tablet TAKE ONE TABLET BY MOUTH EVERY EVENING 90 tablet 1    glimepiride (AMARYL) 4 MG tablet TAKE ONE TABLET BY MOUTH TWICE A DAY WITH MEALS 180 tablet 1    insulin detemir (LEVEMIR FLEXTOUCH) 100 UNIT/ML injection pen Inject 10 Units into the skin nightly 5 pen 3    insulin detemir (LEVEMIR) 100 UNIT/ML injection vial Inject 10 Units into the skin nightly 3 vial 1    tiZANidine (ZANAFLEX) 2 MG tablet Take 1 tablet by mouth 3 times daily as needed (muscle spasm) 30 tablet 0    aspirin 81 MG tablet Take 81 mg by mouth daily  terbinafine (LAMISIL) 1 % cream Apply topically 2 times daily Apply topically 2 times daily.  Blood Glucose Monitoring Suppl (ONETOUCH VERIO IQ SYSTEM) W/DEVICE KIT       ONETOUCH VERIO strip       BD PEN NEEDLE RADU U/F 32G X 4 MM MISC       ONETOUCH DELICA LANCETS FINE MISC       gabapentin (NEURONTIN) 300 MG capsule Take 1 capsule by mouth 3 times daily for 30 days. 90 capsule 2     No current facility-administered medications on file prior to encounter. REVIEW OF SYSTEMS      Constitutional: Negative for systemic symptoms including fever, chills and malaise. Objective:      BP (!) 140/70   Pulse 84   Temp 96.7 °F (35.9 °C) (Temporal)   Resp 18     PHYSICAL EXAM  Constitutional: Negative for systemic symptoms including fever, chills and malaise. General: The patient is in no acute distress. Mental status:  Patient is appropriate, is  oriented to place and plan of care. Dermatologic exam: Visual inspection of the periwound reveals the skin to be normal in turgor and texture.   Wound exam:  see wound description below     All active wounds listed below with today's date are evaluated      Wound 09/17/20 Toe (Comment  which one) Left #2 Left Second Toe (Active)   Wound Image   12/31/20 0813   Dressing Status New dressing applied 12/31/20 0829   Wound Cleansed Soap and water 01/14/21 0824   Offloading for Diabetic Foot Ulcers No 01/14/21 0824   Wound Length (cm) 0.5 cm 01/14/21 0824   Wound Width (cm) 0.5 cm 01/14/21 0824   Wound Depth (cm) 0.1 cm 01/14/21 0824   Wound Surface Area (cm^2) 0.25 cm^2 01/14/21 0824   Change in Wound Size % (l*w) 91.67 01/14/21 0824   Wound Volume (cm^3) 0.02 cm^3 01/14/21 0824   Wound Healing % 93 01/14/21 0824   Post-Procedure Length (cm) 0.3 cm 12/31/20 0819   Post-Procedure Width (cm) 0.3 cm 12/31/20 0819   Post-Procedure Depth (cm) 0.1 cm 12/31/20 0819   Post-Procedure Surface Area (cm^2) 0.09 cm^2 12/31/20 5423 Post-Procedure Volume (cm^3) 0.01 cm^3 20 0819   Distance Tunneling (cm) 0 cm 21   Tunneling Position ___ O'Clock 0 21 0824   Undermining Starts ___ O'Clock 0 21 0824   Undermining Ends___ O'Clock 0 21 0824   Undermining Maxium Distance (cm) 0 21 0824   Wound Assessment Granulation tissue 21   Drainage Amount Moderate 21   Drainage Description Clear 21   Odor None 21   Magdalena-wound Assessment Intact 21   Margins Defined edges 21   Wound Thickness Description not for Pressure Injury Full thickness 21   Number of days: 119       Assessment:       Problem List Items Addressed This Visit     WD-Dog bite of toe left 2nd toe - Primary    Relevant Orders    Supply: Wound Cleanser    Supply: Wound Dressings    Supply: Cover and Secure    WD-Open wound of left great toe due to dog bite    Relevant Orders    Supply: Wound Cleanser    Supply: Wound Dressings    Supply: Cover and Secure          Wound is has significantly improved      Plan:     Discharge Instructions         PHYSICIAN ORDERS AND DISCHARGE INSTRUCTIONS     NOTE: Upon discharge from the [address], you will receive a patient experience survey. We would be grateful if you would take the time to fill this survey out.     Wound care order history:                               Initial Visit: 20  ELOINA's   Right       Left               Date:              Imagin/10/20  Amputation of the 2nd digit at the level of the middle phalanx with associated soft tissue swelling      Cultures:  SENT ON 20  MRSA +              Antibiotics:                       Grafts:      Continuing wound care orders and information:                 Residence:                Continue home health care with:               Your wound-care supplies will be provided by:      Wound cleansing:               QT not scrub or use excessive force.             KVWC hands with soap and water before and after dressing changes.             PQYNW to applying a clean dressing, cleanse wound with normal saline, wound cleanser, or mild soap and water.               Ask the physician or nurse before getting the wound(s) wet in a shower     Daily Wound management:              Keep weight off wounds and reposition every 2 hours.              Avoid standing for long periods of time.              Apply wraps/stockings in AM and remove at bedtime.              If swelling is present, elevate legs to the level of the heart or above for 30 minutes 4-5 times a day and/or when sitting.                                      When taking antibiotics take entire prescription as ordered by physician do not stop taking until medicine is all gone.                                   Orders for this week:    1/14/21     Paint toes with Betadine  Big Toe and Second Toe of Left Foot :Wash with soap and water. Pat dry. Paint with betadine  Dry dressing, Wrap with conform  Secure With tape      Change Daily     Follow up with Dr Stephanie Carlisle In 2 weeks in the wound care center  Call [phone] for any questions or concerns.   Date__________   Time__________          Treatment Note      Written Patient Dismissal Instructions Given            Electronically signed by Cori Mohamud MD on 1/14/2021 at 8:36 AM

## 2021-01-28 ENCOUNTER — HOSPITAL ENCOUNTER (OUTPATIENT)
Dept: WOUND CARE | Age: 66
Discharge: HOME | End: 2021-01-28
Payer: MEDICARE

## 2021-01-28 VITALS
DIASTOLIC BLOOD PRESSURE: 84 MMHG | SYSTOLIC BLOOD PRESSURE: 151 MMHG | RESPIRATION RATE: 18 BRPM | TEMPERATURE: 97.8 F | HEART RATE: 86 BPM
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
## 2021-01-28 DIAGNOSIS — S91.159A DOG BITE OF TOE, INITIAL ENCOUNTER: Primary | ICD-10-CM
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
## 2021-01-28 DIAGNOSIS — S91.152A OPEN WOUND OF LEFT GREAT TOE DUE TO DOG BITE: ICD-10-CM

## 2021-01-28 PROCEDURE — 99211 OFF/OP EST MAY X REQ PHY/QHP: CPT

## 2021-01-28 RX ORDER — LIDOCAINE 50 MG/G
OINTMENT TOPICAL ONCE
Status: CANCELLED | OUTPATIENT
Start: 2021-01-28 | End: 2021-01-28

## 2021-01-28 RX ORDER — LIDOCAINE HYDROCHLORIDE 20 MG/ML
JELLY TOPICAL ONCE
Status: CANCELLED | OUTPATIENT
Start: 2021-01-28 | End: 2021-01-28

## 2021-01-28 RX ORDER — GENTAMICIN SULFATE 1 MG/G
OINTMENT TOPICAL ONCE
Status: CANCELLED | OUTPATIENT
Start: 2021-01-28 | End: 2021-01-28

## 2021-01-28 RX ORDER — BACITRACIN ZINC AND POLYMYXIN B SULFATE 500; 1000 [USP'U]/G; [USP'U]/G
OINTMENT TOPICAL ONCE
Status: CANCELLED | OUTPATIENT
Start: 2021-01-28 | End: 2021-01-28

## 2021-01-28 RX ORDER — LIDOCAINE 40 MG/G
CREAM TOPICAL ONCE
Status: CANCELLED | OUTPATIENT
Start: 2021-01-28 | End: 2021-01-28

## 2021-01-28 RX ORDER — CLOBETASOL PROPIONATE 0.5 MG/G
OINTMENT TOPICAL ONCE
Status: CANCELLED | OUTPATIENT
Start: 2021-01-28 | End: 2021-01-28

## 2021-01-28 RX ORDER — BACITRACIN, NEOMYCIN, POLYMYXIN B 400; 3.5; 5 [USP'U]/G; MG/G; [USP'U]/G
OINTMENT TOPICAL ONCE
Status: CANCELLED | OUTPATIENT
Start: 2021-01-28 | End: 2021-01-28

## 2021-01-28 RX ORDER — GINSENG 100 MG
CAPSULE ORAL ONCE
Status: CANCELLED | OUTPATIENT
Start: 2021-01-28 | End: 2021-01-28

## 2021-01-28 RX ORDER — LIDOCAINE HYDROCHLORIDE 40 MG/ML
SOLUTION TOPICAL ONCE
Status: CANCELLED | OUTPATIENT
Start: 2021-01-28 | End: 2021-01-28

## 2021-01-28 RX ORDER — BETAMETHASONE DIPROPIONATE 0.05 %
OINTMENT (GRAM) TOPICAL ONCE
Status: CANCELLED | OUTPATIENT
Start: 2021-01-28 | End: 2021-01-28

## 2021-01-28 ASSESSMENT — PAIN SCALES - GENERAL: PAINLEVEL_OUTOF10: 0

## 2021-02-03 DIAGNOSIS — E11.9 TYPE 2 DIABETES MELLITUS WITHOUT COMPLICATION, WITHOUT LONG-TERM CURRENT USE OF INSULIN (HCC): Primary | ICD-10-CM

## 2021-02-03 DIAGNOSIS — E03.9 HYPOTHYROIDISM, UNSPECIFIED TYPE: ICD-10-CM

## 2021-02-03 DIAGNOSIS — E78.5 DYSLIPIDEMIA: ICD-10-CM

## 2021-02-03 DIAGNOSIS — I10 ESSENTIAL HYPERTENSION: ICD-10-CM

## 2021-02-03 RX ORDER — LEVOTHYROXINE SODIUM 0.1 MG/1
TABLET ORAL
Qty: 30 TABLET | Refills: 0 | Status: SHIPPED | OUTPATIENT
Start: 2021-02-03 | End: 2021-02-15 | Stop reason: SDUPTHER

## 2021-02-03 RX ORDER — LISINOPRIL 40 MG/1
TABLET ORAL
Qty: 30 TABLET | Refills: 0 | Status: SHIPPED | OUTPATIENT
Start: 2021-02-03 | End: 2021-03-05 | Stop reason: SDUPTHER

## 2021-02-15 RX ORDER — HYDROCHLOROTHIAZIDE 25 MG/1
TABLET ORAL
Qty: 90 TABLET | Refills: 1 | Status: SHIPPED | OUTPATIENT
Start: 2021-02-15 | End: 2021-03-05 | Stop reason: SDUPTHER

## 2021-02-15 RX ORDER — GLIMEPIRIDE 4 MG/1
TABLET ORAL
Qty: 180 TABLET | Refills: 1 | Status: SHIPPED | OUTPATIENT
Start: 2021-02-15 | End: 2021-03-05 | Stop reason: SDUPTHER

## 2021-02-15 RX ORDER — PAROXETINE 30 MG/1
TABLET, FILM COATED ORAL
Qty: 30 TABLET | Refills: 0 | Status: SHIPPED | OUTPATIENT
Start: 2021-02-15 | End: 2021-03-05 | Stop reason: SDUPTHER

## 2021-02-15 RX ORDER — PIOGLITAZONEHYDROCHLORIDE 15 MG/1
TABLET ORAL
Qty: 30 TABLET | Refills: 0 | Status: SHIPPED | OUTPATIENT
Start: 2021-02-15 | End: 2021-04-01

## 2021-02-15 RX ORDER — METFORMIN HYDROCHLORIDE 500 MG/1
TABLET, EXTENDED RELEASE ORAL
Qty: 360 TABLET | Refills: 0 | Status: SHIPPED | OUTPATIENT
Start: 2021-02-15 | End: 2021-03-05 | Stop reason: SDUPTHER

## 2021-02-15 RX ORDER — LEVOTHYROXINE SODIUM 0.1 MG/1
TABLET ORAL
Qty: 30 TABLET | Refills: 0 | Status: SHIPPED | OUTPATIENT
Start: 2021-02-15 | End: 2021-03-05 | Stop reason: SDUPTHER

## 2021-03-05 ENCOUNTER — OFFICE VISIT (OUTPATIENT)
Dept: FAMILY MEDICINE CLINIC | Age: 66
End: 2021-03-05
Payer: MEDICARE

## 2021-03-05 VITALS
WEIGHT: 254.7 LBS | DIASTOLIC BLOOD PRESSURE: 78 MMHG | BODY MASS INDEX: 35.66 KG/M2 | OXYGEN SATURATION: 98 % | TEMPERATURE: 97.2 F | HEIGHT: 71 IN | HEART RATE: 78 BPM | SYSTOLIC BLOOD PRESSURE: 124 MMHG

## 2021-03-05 DIAGNOSIS — I48.91 ATRIAL FIBRILLATION, UNSPECIFIED TYPE (HCC): ICD-10-CM

## 2021-03-05 DIAGNOSIS — S79.811A BLUNT TRAUMA OF RIGHT HIP, INITIAL ENCOUNTER: ICD-10-CM

## 2021-03-05 DIAGNOSIS — E11.9 TYPE 2 DIABETES MELLITUS WITHOUT COMPLICATION, WITHOUT LONG-TERM CURRENT USE OF INSULIN (HCC): Primary | ICD-10-CM

## 2021-03-05 DIAGNOSIS — I10 ESSENTIAL HYPERTENSION: ICD-10-CM

## 2021-03-05 DIAGNOSIS — M54.16 LUMBAR RADICULOPATHY: ICD-10-CM

## 2021-03-05 DIAGNOSIS — M25.561 ACUTE PAIN OF RIGHT KNEE: ICD-10-CM

## 2021-03-05 LAB — HBA1C MFR BLD: 6.2 %

## 2021-03-05 PROCEDURE — G8484 FLU IMMUNIZE NO ADMIN: HCPCS | Performed by: FAMILY MEDICINE

## 2021-03-05 PROCEDURE — 99214 OFFICE O/P EST MOD 30 MIN: CPT | Performed by: FAMILY MEDICINE

## 2021-03-05 PROCEDURE — 3046F HEMOGLOBIN A1C LEVEL >9.0%: CPT | Performed by: FAMILY MEDICINE

## 2021-03-05 PROCEDURE — 3017F COLORECTAL CA SCREEN DOC REV: CPT | Performed by: FAMILY MEDICINE

## 2021-03-05 PROCEDURE — G8417 CALC BMI ABV UP PARAM F/U: HCPCS | Performed by: FAMILY MEDICINE

## 2021-03-05 PROCEDURE — 1123F ACP DISCUSS/DSCN MKR DOCD: CPT | Performed by: FAMILY MEDICINE

## 2021-03-05 PROCEDURE — 83036 HEMOGLOBIN GLYCOSYLATED A1C: CPT | Performed by: FAMILY MEDICINE

## 2021-03-05 PROCEDURE — 1036F TOBACCO NON-USER: CPT | Performed by: FAMILY MEDICINE

## 2021-03-05 PROCEDURE — G8427 DOCREV CUR MEDS BY ELIG CLIN: HCPCS | Performed by: FAMILY MEDICINE

## 2021-03-05 PROCEDURE — 2022F DILAT RTA XM EVC RTNOPTHY: CPT | Performed by: FAMILY MEDICINE

## 2021-03-05 PROCEDURE — 4040F PNEUMOC VAC/ADMIN/RCVD: CPT | Performed by: FAMILY MEDICINE

## 2021-03-05 RX ORDER — METFORMIN HYDROCHLORIDE 500 MG/1
TABLET, EXTENDED RELEASE ORAL
Qty: 360 TABLET | Refills: 1 | Status: SHIPPED | OUTPATIENT
Start: 2021-03-05

## 2021-03-05 RX ORDER — PAROXETINE 30 MG/1
TABLET, FILM COATED ORAL
Qty: 90 TABLET | Refills: 1 | Status: SHIPPED | OUTPATIENT
Start: 2021-03-05 | End: 2021-06-14 | Stop reason: SDUPTHER

## 2021-03-05 RX ORDER — LEVOTHYROXINE SODIUM 0.1 MG/1
TABLET ORAL
Qty: 90 TABLET | Refills: 1 | Status: SHIPPED | OUTPATIENT
Start: 2021-03-05

## 2021-03-05 RX ORDER — CELECOXIB 200 MG/1
200 CAPSULE ORAL DAILY
Qty: 90 CAPSULE | Refills: 1 | Status: SHIPPED | OUTPATIENT
Start: 2021-03-05 | End: 2021-09-01

## 2021-03-05 RX ORDER — GLIMEPIRIDE 4 MG/1
TABLET ORAL
Qty: 180 TABLET | Refills: 1 | Status: SHIPPED | OUTPATIENT
Start: 2021-03-05

## 2021-03-05 RX ORDER — HYDROCHLOROTHIAZIDE 25 MG/1
TABLET ORAL
Qty: 90 TABLET | Refills: 1 | Status: SHIPPED | OUTPATIENT
Start: 2021-03-05

## 2021-03-05 RX ORDER — ATORVASTATIN CALCIUM 40 MG/1
TABLET, FILM COATED ORAL
Qty: 90 TABLET | Refills: 1 | Status: SHIPPED | OUTPATIENT
Start: 2021-03-05

## 2021-03-05 RX ORDER — INSULIN DETEMIR 100 [IU]/ML
10 INJECTION, SOLUTION SUBCUTANEOUS NIGHTLY
Qty: 9 ML | Refills: 1 | Status: SHIPPED | OUTPATIENT
Start: 2021-03-05 | End: 2021-09-01

## 2021-03-05 RX ORDER — LISINOPRIL 40 MG/1
TABLET ORAL
Qty: 90 TABLET | Refills: 1 | Status: SHIPPED | OUTPATIENT
Start: 2021-03-05

## 2021-03-05 RX ORDER — GABAPENTIN 300 MG/1
300 CAPSULE ORAL 3 TIMES DAILY
Qty: 90 CAPSULE | Refills: 2 | Status: SHIPPED | OUTPATIENT
Start: 2021-03-05 | End: 2021-06-14 | Stop reason: SDUPTHER

## 2021-03-05 RX ORDER — PIOGLITAZONEHYDROCHLORIDE 15 MG/1
TABLET ORAL
Qty: 90 TABLET | Refills: 1 | Status: CANCELLED | OUTPATIENT
Start: 2021-03-05

## 2021-03-05 RX ORDER — DILTIAZEM HYDROCHLORIDE 180 MG/1
180 CAPSULE, COATED, EXTENDED RELEASE ORAL DAILY
Qty: 180 CAPSULE | Refills: 1 | Status: SHIPPED | OUTPATIENT
Start: 2021-03-05 | End: 2021-04-05 | Stop reason: SDUPTHER

## 2021-03-05 ASSESSMENT — ENCOUNTER SYMPTOMS
COUGH: 0
SHORTNESS OF BREATH: 0

## 2021-03-05 NOTE — PROGRESS NOTES
3/5/2021     Jonathon Martinez      Chief Complaint   Patient presents with    6 Month Follow-Up    Shortness of Breath     Patient states he has been SOB w/ exertion since starting Actos    Health Maintenance     Patient see's Dr Katy Haile for care of his feet @ wound center    Medication Refill       HPI      Aaliyah Tapia is a 74 y.o. male who presents today with the followin. Type 2 diabetes mellitus without complication, without long-term current use of insulin ([address]    2. Lumbar radiculopathy    3. Acute pain of right knee    4. Essential hypertension    5. Atrial fibrillation, unspecified type ([address]    6. 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 Respiratory: Negative for cough and shortness of breath. Cardiovascular: Negative for chest pain. History of atrial fibrillation which is currently well controlled rate wise he is anticoagulated  Is on treatment for hypertension hyperlipidemia   Endocrine:        Diabetes type 2  The patient says he is having side effects from Actos he has gained about 15 pounds he feels short of breath since he went on he is retaining fluid   Musculoskeletal: Positive for arthralgias. Psychiatric/Behavioral: Negative. PAST MEDICAL HISTORY  Past Medical History:   Diagnosis Date    Arrhythmia     Atrial fibrillation ([address]     Carotid Doppler 2018    Mild (0-49%) disease of the Bilateral Internal carotid artery. No hemodynamically significant stenosis noted. Normal vertebral flow.  Diabetes mellitus ([address]     H/O echocardiogram 2020    EF 50%. Sclerotic aortic valve with mild stenosis.    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 No reversible myocardial ischemia seen. Uneventful pharmacological     History of echocardiogram 01/30/2017    Ejection fraction is visually estimated at 55%. mildly Increased LVOT velocity. Aortic valve leaflets are somewhat thickened. Aortic valve appears tricuspid. Trivial aortic regurgitation is noted. Mitral annular calcification is present. Mild calcification of the anterior leaflet of the mitral valve.  Hyperlipidemia     Hypertension     WD-Dog bite of toe left 2nd toe 9/17/2020    WD-Open wound of left great toe due to dog bite 9/17/2020       FAMILY HISTORY  Family History   Problem Relation Age of Onset    Heart Disease Father        SOCIAL HISTORY  Social History     Socioeconomic History    Marital status:       Spouse name: None    Number of children: None    Years of education: None    Highest education level: None   Occupational History    None   Social Needs    Financial resource strain: None    Food insecurity     Worry: None     Inability: None    Transportation needs     Medical: None     Non-medical: None   Tobacco Use    Smoking status: Never Smoker    Smokeless tobacco: Never Used   Substance and Sexual Activity    Alcohol use: Yes     Comment: caffeine 2 cups of coffee a day    Drug use: No    Sexual activity: None   Lifestyle    Physical activity     Days per week: None     Minutes per session: None    Stress: None   Relationships    Social connections     Talks on phone: None     Gets together: None     Attends Baptism service: None     Active member of club or organization: None     Attends meetings of clubs or organizations: None     Relationship status: None    Intimate partner violence     Fear of current or ex partner: None     Emotionally abused: None     Physically abused: None     Forced sexual activity: None   Other Topics Concern    None   Social History Narrative  None        SURGICAL HISTORY  Past Surgical History:   Procedure Laterality Date    OTHER SURGICAL HISTORY  08/22/2018    Atrial Fib cardiac Ablation       CURRENT MEDICATIONS  Current Outpatient Medications   Medication Sig Dispense Refill    atorvastatin (LIPITOR) 40 MG tablet TAKE ONE TABLET BY MOUTH DAILY 90 tablet 1    celecoxib (CELEBREX) 200 MG capsule Take 1 capsule by mouth daily 90 capsule 1    dilTIAZem (CARDIZEM CD) 180 MG extended release capsule Take 1 capsule by mouth daily 180 capsule 1    gabapentin (NEURONTIN) 300 MG capsule Take 1 capsule by mouth 3 times daily for 30 days. 90 capsule 2    glimepiride (AMARYL) 4 MG tablet TAKE ONE TABLET BY MOUTH TWICE A DAY WITH MEALS 180 tablet 1    hydroCHLOROthiazide (HYDRODIURIL) 25 MG tablet TAKE ONE TABLET BY MOUTH EVERY EVENING 90 tablet 1    insulin detemir (LEVEMIR FLEXTOUCH) 100 UNIT/ML injection pen Inject 10 Units into the skin nightly 9 mL 1    levothyroxine (SYNTHROID) 100 MCG tablet TAKE ONE TABLET BY MOUTH EVERY EVENING 90 tablet 1    lisinopril (PRINIVIL;ZESTRIL) 40 MG tablet TAKE ONE TABLET BY MOUTH DAILY 90 tablet 1    metFORMIN (GLUCOPHAGE-XR) 500 MG extended release tablet TAKE FOUR TABLETS BY MOUTH DAILY WITH BREAKFAST 360 tablet 1    PARoxetine (PAXIL) 30 MG tablet TAKE ONE TABLET BY MOUTH EVERY MORNING 90 tablet 1    rivaroxaban (XARELTO) 20 MG TABS tablet Take 1 tablet by mouth daily (with breakfast) 90 tablet 1    pioglitazone (ACTOS) 15 MG tablet TAKE ONE TABLET BY MOUTH EVERY EVENING 30 tablet 0    aspirin 81 MG tablet Take 81 mg by mouth daily      Blood Glucose Monitoring Suppl (ONETOUCH VERIO IQ SYSTEM) W/DEVICE KIT       ONETOUCH VERIO strip       BD PEN NEEDLE RADU U/F 32G X 4 MM MISC       ONETOUCH DELICA LANCETS FINE MISC        No current facility-administered medications for this visit.         ALLERGIES  Allergies   Allergen Reactions    Morphine Rash       PHYSICAL EXAM -     lisinopril (PRINIVIL;ZESTRIL) 40 MG tablet; TAKE ONE TABLET BY MOUTH DAILY  -     metFORMIN (GLUCOPHAGE-XR) 500 MG extended release tablet; TAKE FOUR TABLETS BY MOUTH DAILY WITH BREAKFAST  -     PARoxetine (PAXIL) 30 MG tablet; TAKE ONE TABLET BY MOUTH EVERY MORNING  -     rivaroxaban (XARELTO) 20 MG TABS tablet; Take 1 tablet by mouth daily (with breakfast)    Okay to stop the Actos and closely monitor his blood sugar  Continue his other medicines the same  Follow-up in 3 months and will get A1c then  Is to go today to get an x-ray of his right hip    Return in about 3 months (around 6/5/2021). Electronically signed by Merissa Simmons MD on 3/5/2021    Please note that this chart was generated using dragon dictation software. Although every effort was made to ensure the accuracy of this automated transcription, some errors in transcription may have occurred.

## 2021-03-10 ENCOUNTER — HOSPITAL ENCOUNTER (OUTPATIENT)
Dept: GENERAL RADIOLOGY | Age: 66
Discharge: HOME | End: 2021-03-10
Payer: MEDICARE

## 2021-03-10 ENCOUNTER — HOSPITAL ENCOUNTER (OUTPATIENT)
Age: 66
Discharge: HOME | End: 2021-03-10
Payer: MEDICARE

## 2021-03-10 DIAGNOSIS — S79.811A BLUNT TRAUMA OF RIGHT HIP, INITIAL ENCOUNTER: ICD-10-CM

## 2021-03-10 PROCEDURE — 73502 X-RAY EXAM HIP UNI 2-3 VIEWS: CPT

## 2021-04-01 ENCOUNTER — OFFICE VISIT (OUTPATIENT)
Dept: CARDIOLOGY CLINIC | Age: 66
End: 2021-04-01
Payer: MEDICARE

## 2021-04-01 VITALS
SYSTOLIC BLOOD PRESSURE: 128 MMHG | HEART RATE: 88 BPM | WEIGHT: 250 LBS | BODY MASS INDEX: 35 KG/M2 | DIASTOLIC BLOOD PRESSURE: 70 MMHG | HEIGHT: 71 IN

## 2021-04-01 DIAGNOSIS — Z86.79 S/P ABLATION OF ATRIAL FIBRILLATION: ICD-10-CM

## 2021-04-01 DIAGNOSIS — E78.5 DYSLIPIDEMIA: ICD-10-CM

## 2021-04-01 DIAGNOSIS — E11.9 TYPE 2 DIABETES MELLITUS WITHOUT COMPLICATION, WITHOUT LONG-TERM CURRENT USE OF INSULIN (HCC): ICD-10-CM

## 2021-04-01 DIAGNOSIS — R06.02 SOB (SHORTNESS OF BREATH): ICD-10-CM

## 2021-04-01 DIAGNOSIS — I10 ESSENTIAL HYPERTENSION: ICD-10-CM

## 2021-04-01 DIAGNOSIS — I48.0 PAF (PAROXYSMAL ATRIAL FIBRILLATION) (HCC): Primary | ICD-10-CM

## 2021-04-01 DIAGNOSIS — I65.23 BILATERAL CAROTID ARTERY STENOSIS: ICD-10-CM

## 2021-04-01 DIAGNOSIS — Z98.890 S/P ABLATION OF ATRIAL FIBRILLATION: ICD-10-CM

## 2021-04-01 PROCEDURE — 1036F TOBACCO NON-USER: CPT | Performed by: INTERNAL MEDICINE

## 2021-04-01 PROCEDURE — 2022F DILAT RTA XM EVC RTNOPTHY: CPT | Performed by: INTERNAL MEDICINE

## 2021-04-01 PROCEDURE — G8427 DOCREV CUR MEDS BY ELIG CLIN: HCPCS | Performed by: INTERNAL MEDICINE

## 2021-04-01 PROCEDURE — 3017F COLORECTAL CA SCREEN DOC REV: CPT | Performed by: INTERNAL MEDICINE

## 2021-04-01 PROCEDURE — 3046F HEMOGLOBIN A1C LEVEL >9.0%: CPT | Performed by: INTERNAL MEDICINE

## 2021-04-01 PROCEDURE — G8417 CALC BMI ABV UP PARAM F/U: HCPCS | Performed by: INTERNAL MEDICINE

## 2021-04-01 PROCEDURE — 1123F ACP DISCUSS/DSCN MKR DOCD: CPT | Performed by: INTERNAL MEDICINE

## 2021-04-01 PROCEDURE — 4040F PNEUMOC VAC/ADMIN/RCVD: CPT | Performed by: INTERNAL MEDICINE

## 2021-04-01 PROCEDURE — 99214 OFFICE O/P EST MOD 30 MIN: CPT | Performed by: INTERNAL MEDICINE

## 2021-04-01 PROCEDURE — 93000 ELECTROCARDIOGRAM COMPLETE: CPT | Performed by: INTERNAL MEDICINE

## 2021-04-01 RX ORDER — TAMSULOSIN HYDROCHLORIDE 0.4 MG/1
CAPSULE ORAL
COMMUNITY
Start: 2021-03-18

## 2021-04-01 NOTE — PROGRESS NOTES
[id number] Score for Atrial Fibrillation Stroke Risk   Risk   Factors  Component Value   C CHF No 0   H HTN Yes 1   A2 Age >= 76 No,  (75 y.o.) 0   D DM Yes 1   S2 Prior Stroke/TIA No 0   V Vascular Disease No 0   A Age 74-69 Yes,  (75 y.o.) 1   Sc Sex male 0    [id number]  Score  3   Score last updated 4/1/21 3:00 PM EDT    Click here for a link to the UpToDate guideline \"Atrial Fibrillation: Anticoagulation therapy to prevent embolization    Disclaimer: Risk Score calculation is dependent on accuracy of patient problem list and past encounter diagnosis.

## 2021-04-01 NOTE — PROGRESS NOTES
OFFICE PROGRESS NOTES      Danica Parra is a 74 y.o. male who has    CHIEF COMPLAINT AS FOLLOWS:  CHEST PAIN: Patient  C/O chest TIGHTNESS.      SOB:  C/O SOB with exertion.                 LEG EDEMA: No leg edema   PALPITATIONS: Denies any C/O Palpitations                                  DIZZINESS: No C/O Dizziness   SYNCOPE: None   OTHER:                                    HPI: Patient is here for F/U on his Arrhythmia, HTN & Dyslipidemia problems. Arrhythmia: Patient has known Hx of A-fib. HTN: Patient has known Hx of essential HTN. Has been treated with guideline recommended medical / physical/ diet therapy as stated below. Dyslipidemia: Patient has known Hx of mixed dyslipidemia. Has been treated with guideline recommended medical / physical/ diet therapy as stated below. He does not have any new complaints at this time. Current Outpatient Medications   Medication Sig Dispense Refill    tamsulosin (FLOMAX) 0.4 MG capsule       rivaroxaban (XARELTO) 20 MG TABS tablet Take 1 tablet by mouth daily (with breakfast) 14 tablet 0    atorvastatin (LIPITOR) 40 MG tablet TAKE ONE TABLET BY MOUTH DAILY 90 tablet 1    celecoxib (CELEBREX) 200 MG capsule Take 1 capsule by mouth daily 90 capsule 1    dilTIAZem (CARDIZEM CD) 180 MG extended release capsule Take 1 capsule by mouth daily (Patient taking differently: Take 360 mg by mouth daily ) 180 capsule 1    gabapentin (NEURONTIN) 300 MG capsule Take 1 capsule by mouth 3 times daily for 30 days.  90 capsule 2    glimepiride (AMARYL) 4 MG tablet TAKE ONE TABLET BY MOUTH TWICE A DAY WITH MEALS 180 tablet 1    hydroCHLOROthiazide (HYDRODIURIL) 25 MG tablet TAKE ONE TABLET BY MOUTH EVERY EVENING 90 tablet 1    insulin detemir (LEVEMIR FLEXTOUCH) 100 UNIT/ML injection pen Inject 10 Units into the skin nightly 9 mL 1    levothyroxine (SYNTHROID) 100 MCG tablet TAKE ONE TABLET BY MOUTH EVERY EVENING 90 tablet 1    lisinopril (PRINIVIL;ZESTRIL) 40 MG tablet TAKE ONE TABLET BY MOUTH DAILY 90 tablet 1    metFORMIN (GLUCOPHAGE-XR) 500 MG extended release tablet TAKE FOUR TABLETS BY MOUTH DAILY WITH BREAKFAST 360 tablet 1    PARoxetine (PAXIL) 30 MG tablet TAKE ONE TABLET BY MOUTH EVERY MORNING 90 tablet 1    aspirin 81 MG tablet Take 81 mg by mouth daily      Blood Glucose Monitoring Suppl (ONETOUCH VERIO IQ SYSTEM) W/DEVICE KIT       ONETOUCH VERIO strip       BD PEN NEEDLE RADU U/F 32G X 4 MM MISC       ONETOUCH DELICA LANCETS FINE MISC        No current facility-administered medications for this visit. Allergies: Morphine  Review of Systems:    Constitutional: Negative for diaphoresis and fatigue  Respiratory: Negative for shortness of breath  Cardiovascular: Negative for chest pain, dyspnea on exertion, claudication, edema, irregular heartbeat, murmur, palpitations or shortness of breath  Musculoskeletal: Negative for muscle pain, muscular weakness, negative for pain in arm and leg or swelling in foot and leg    Objective:  /70   Pulse 88   Ht 5' 11\" (1.803 m)   Wt 250 lb (113.4 kg)   BMI 34.87 kg/m²   Wt Readings from Last 3 Encounters:   04/01/21 250 lb (113.4 kg)   03/05/21 254 lb 11.2 oz (115.5 kg)   09/17/20 238 lb (108 kg)     Body mass index is 34.87 kg/m². GENERAL - Alert, oriented, pleasant, in no apparent distress. EYES: No jaundice, no conjunctival pallor. Neck - Supple. No jugular venous distention noted. No carotid bruits. Cardiovascular  Normal S1 and S2 without obvious murmur or gallop. Extremities - No cyanosis, clubbing, or significant edema. Pulmonary  No respiratory distress. No wheezes or rales.       Lab Review   Lab Results   Component Value Date    TROPONINT <0.010 01/28/2017     Lab Results   Component Value Date    BNP 92 07/30/2011    PROBNP 56.45 01/28/2017     Lab Results   Component Value Date    INR 1.15 08/20/2018    INR 1.01 07/30/2011     Lab Results   Component Value Date    LABA1C 7.3 08/13/2019    LABA1C 6.8 (H) 02/04/2019     Lab Results   Component Value Date    WBC 10.7 (H) 08/23/2018    WBC 9.2 08/20/2018    HCT 36.9 (L) 08/23/2018    HCT 38.0 (L) 08/22/2018    MCV 97.9 08/23/2018    MCV 97.8 08/20/2018     08/23/2018     08/20/2018     Lab Results   Component Value Date    CHOL 134 08/13/2019    CHOL 113 04/23/2018    CHOL 143 04/23/2018    TRIG 76 08/13/2019    TRIG 81 04/23/2018    TRIG 81 04/23/2018    HDL 52 08/13/2019    HDL 58 04/23/2018    HDL 58 04/23/2018    LDLCALC 67 08/13/2019    LDLCALC 69 04/23/2018    LDLCALC 69 04/23/2018    LDLDIRECT 137 (H) 07/20/2011     Lab Results   Component Value Date    ALT 22 08/13/2019    ALT 44 04/23/2018    ALT 44 04/23/2018    AST 29 08/13/2019    AST 41 04/23/2018    AST 41 04/23/2018     BMP:    Lab Results   Component Value Date     08/13/2019     08/23/2018    K 4.9 08/13/2019    K 4.1 08/23/2018    CL 94 08/13/2019     08/23/2018    CO2 29 08/13/2019    CO2 27 08/23/2018    BUN 30 08/13/2019    BUN 19 08/23/2018    CREATININE 1.0 08/13/2019    CREATININE 0.9 08/23/2018     CMP:   Lab Results   Component Value Date     08/13/2019     08/23/2018    K 4.9 08/13/2019    K 4.1 08/23/2018    CL 94 08/13/2019     08/23/2018    CO2 29 08/13/2019    CO2 27 08/23/2018    BUN 30 08/13/2019    BUN 19 08/23/2018    CREATININE 1.0 08/13/2019    CREATININE 0.9 08/23/2018    PROT 7.2 08/13/2019    PROT 7.4 01/28/2017    PROT 7.6 07/30/2011    PROT 6.6 07/20/2011     Lab Results   Component Value Date    TSHHS 2.880 07/31/2011     EKG: NSR, RBBB    ECHO 8/2020   Left ventricular function is low normal, EF is estimated at 50-55%. Grade I diastolic dysfunction. Sclerotic aortic valve with mild stenosis, mean gradient is 21mmHg. Mitral annular calcification and calcification of the anterior leaflet of   mitral valve is present. No significant valvular regurgitation noted.    No evidence of pericardial to exercise due to back & Knees. TESTS ORDERED: None this visit                                      All previously ordered tests reviewed. Echo has mild AS only.   ARRHYTHMIAS: known                                AURFMGX has H/O Atrial fibrillation S/P Ablation                                He is rate controlled & on anticoagulation.                                Patient is in NSR .   MEDICATIONS: CPM   Office f/u in six months.

## 2021-04-01 NOTE — LETTER
[facility]  [address] [address]  Phone: [phone]  Fax: [phone]    Loli Collado MD        April 1, 2021     Sera Newman, [address] [place] [id number]    Patient: Tete Couch  MR Number: [id number]  YOB: 1955  Date of Visit: 4/1/2021    Dear Dr. Sera Newman:    Thank you for the request for consultation for Celia Jane to me for the evaluation of htn. Below are the relevant portions of my assessment and plan of care. If you have questions, please do not hesitate to call me. I look forward to following Porfirio Harris along with you.     Sincerely,        Loli Collado MD

## 2021-04-01 NOTE — LETTER
Mei Orellana  1955  [id number]    Have you had any Chest Pain that is not new? - Yes  If Yes DO EKG - How does it feel - Tightness   How long does the pain last -  seconds    How long have you been having the pain - couple of months   Did you take a rest   And did it relieve the pain - Yes      Have you had any Shortness of Breath - Yes  If Yes - When on exertion walking 250 feet or less going up steps  Couple of months     Have you had any dizziness - No    Have you had any palpitations that are not new? - Yes  If Yes DO EKG - Do you feel your heart skipping sometimes can happen at anytime couple times a day comes and goes    Is the patient on any of the following medications -   If Yes DO EKG - Needs done every 6 months    Do you have any edema - swelling in No      When did you have your last labs drawn   Where did you have them done   What doctor ordered     If we do not have these labs you are retrieve these labs for these providers!     Do you have a surgery or procedure scheduled in the near future - No       Ask patient if they want to sign up for [facility] if they are not already signed up     Check to see if we have an E-MAIL on file for the patient     Check medication list thoroughly!!! AND RECONCILE OUTSIDE MEDICATIONS  If dose has changed change the entire order not just the MG  BE SURE TO ASK PATIENT IF THEY NEED MEDICATION REFILLS     At check out add to every patient's \"wrap up\" the following dot phrase AFTERHOURSEDUCATION and ensure we explain this to our patients

## 2021-04-01 NOTE — PATIENT INSTRUCTIONS
CAD:None known  HTN:well controlled on current medical regimen, see list above.             - changes in  treatment:   no   CARDIOMYOPATHY: None known   CONGESTIVE HEART FAILURE: NO KNOWN HISTORY.      VHD: No significant VHD noted  DYSLIPIDEMIA: Patient's profile is at [facility] near [facility] is low                                Tolerating current medical regimen wellyes,                                                             See most recent Lab values in Labs section above.   Diabetes mellitis: .yes,                                      Managed by family MD                                     BS under good control yes,                                      Hgb A1c avilable yes,   CAROTID ARTERY DISEASE:.yes, mild               No symptoms described pertaining to Carotid artery disease               Up to date on non invasive testing .yes,                Patient is on Guidelines recommended therapy. yes,   OTHER RELEVANT DIAGNOSIS:as noted in patient's active problem list:  SOB: ? ALBERT, DUE TO WEIGHT GAIN. Patient not able to exercise due to back & Knees. TESTS ORDERED: None this visit                                      All previously ordered tests reviewed. Echo has mild AS only.   ARRHYTHMIAS: known                                BLOUKTQ has H/O Atrial fibrillation S/P Ablation                                He is rate controlled & on anticoagulation.                                Patient is in NSR .   MEDICATIONS: CPM   Office f/u in six months.

## 2021-04-05 ENCOUNTER — TELEPHONE (OUTPATIENT)
Dept: FAMILY MEDICINE CLINIC | Age: 66
End: 2021-04-05

## 2021-04-05 NOTE — TELEPHONE ENCOUNTER
Is Patient taking diltiazem 180mg 1qd or 180 mg bid? Patient stated he is taking it twice a day.   Please call and clarify

## 2021-04-05 NOTE — TELEPHONE ENCOUNTER
Patient states he has always taken 180 bid, per old records he did as well.     [employer] and verified

## 2021-04-06 RX ORDER — DILTIAZEM HYDROCHLORIDE 180 MG/1
180 CAPSULE, COATED, EXTENDED RELEASE ORAL DAILY
Qty: 180 CAPSULE | Refills: 1 | Status: SHIPPED | OUTPATIENT
Start: 2021-04-06

## 2021-04-22 NOTE — TELEPHONE ENCOUNTER
Patient called requesting samples of Xarelto, patient was advised that samples should be ready for  by tomorrow afternoon, please call with any problems at ph# [phone].

## 2021-06-14 ENCOUNTER — OFFICE VISIT (OUTPATIENT)
Dept: FAMILY MEDICINE CLINIC | Age: 66
End: 2021-06-14
Payer: MEDICARE

## 2021-06-14 VITALS
HEIGHT: 71 IN | SYSTOLIC BLOOD PRESSURE: 120 MMHG | OXYGEN SATURATION: 98 % | DIASTOLIC BLOOD PRESSURE: 70 MMHG | WEIGHT: 246 LBS | BODY MASS INDEX: 34.44 KG/M2 | HEART RATE: 80 BPM

## 2021-06-14 DIAGNOSIS — E11.9 TYPE 2 DIABETES MELLITUS WITHOUT COMPLICATION, WITHOUT LONG-TERM CURRENT USE OF INSULIN (HCC): Primary | ICD-10-CM

## 2021-06-14 DIAGNOSIS — M25.559 HIP PAIN: ICD-10-CM

## 2021-06-14 DIAGNOSIS — R06.02 SOB (SHORTNESS OF BREATH): ICD-10-CM

## 2021-06-14 LAB — HBA1C MFR BLD: 6.8 %

## 2021-06-14 PROCEDURE — 4040F PNEUMOC VAC/ADMIN/RCVD: CPT | Performed by: FAMILY MEDICINE

## 2021-06-14 PROCEDURE — 99214 OFFICE O/P EST MOD 30 MIN: CPT | Performed by: FAMILY MEDICINE

## 2021-06-14 PROCEDURE — 2022F DILAT RTA XM EVC RTNOPTHY: CPT | Performed by: FAMILY MEDICINE

## 2021-06-14 PROCEDURE — 3044F HG A1C LEVEL LT 7.0%: CPT | Performed by: FAMILY MEDICINE

## 2021-06-14 PROCEDURE — 83036 HEMOGLOBIN GLYCOSYLATED A1C: CPT | Performed by: FAMILY MEDICINE

## 2021-06-14 PROCEDURE — 3017F COLORECTAL CA SCREEN DOC REV: CPT | Performed by: FAMILY MEDICINE

## 2021-06-14 PROCEDURE — G8427 DOCREV CUR MEDS BY ELIG CLIN: HCPCS | Performed by: FAMILY MEDICINE

## 2021-06-14 PROCEDURE — 1123F ACP DISCUSS/DSCN MKR DOCD: CPT | Performed by: FAMILY MEDICINE

## 2021-06-14 PROCEDURE — G8417 CALC BMI ABV UP PARAM F/U: HCPCS | Performed by: FAMILY MEDICINE

## 2021-06-14 PROCEDURE — 1036F TOBACCO NON-USER: CPT | Performed by: FAMILY MEDICINE

## 2021-06-14 RX ORDER — PAROXETINE 30 MG/1
TABLET, FILM COATED ORAL
Qty: 90 TABLET | Refills: 1 | Status: SHIPPED | OUTPATIENT
Start: 2021-06-14

## 2021-06-14 RX ORDER — GABAPENTIN 300 MG/1
300 CAPSULE ORAL 3 TIMES DAILY
Qty: 90 CAPSULE | Refills: 2 | Status: SHIPPED | OUTPATIENT
Start: 2021-06-14 | End: 2021-07-15

## 2021-06-14 NOTE — PROGRESS NOTES
2021     [address]      Chief Complaint   Patient presents with    3 Month Follow-Up    Weight Loss     pt reports he would like to lose some weight, states he has hip and leg problems that restrict exercise    Leg Pain     pt reports he cannot stand for long periods of time, pain in right thigh has gotten worse    Shortness of Breath     pt reports concerns for atorvastatin causing SOB       HPI      Sarai Mcduffie is a 74 y.o. male who presents today with the followin. Type 2 diabetes mellitus without complication, without long-term current use of insulin ([address]    2. Hip pain    3. SOB (shortness of breath)    Sarai Mcduffie was taken off of Actos 3 months ago  He had had shortness of breath and weight gain and I thought it was likely being aggravated by the Actos  He said the shortness of breath is better but he has not yet been able to lose any of the weight  His home blood sugars been pretty good    REVIEW OF SYMPTOMS    Review of Systems   Cardiovascular:        Hypertension hyperlipidemia  He has had atrial fibrillation  He is on rate control and anticoagulation  His statin was switched from simvastatin because of interaction with calcium channel blocker   Musculoskeletal:        Continues to complain of right hip pain  I seen before about this got an x-ray which was negative         PAST MEDICAL HISTORY  Past Medical History:   Diagnosis Date    Arrhythmia     Atrial fibrillation ([address]     Carotid Doppler 2018    Mild (0-49%) disease of the Bilateral Internal carotid artery. No hemodynamically significant stenosis noted. Normal vertebral flow.  Diabetes mellitus ([address]     H/O echocardiogram 2020    EF 50%. Sclerotic aortic valve with mild stenosis. 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 No reversible myocardial ischemia seen.    Uneventful pharmacological     History of echocardiogram 2017 Ejection fraction is visually estimated at 55%. mildly Increased LVOT velocity. Aortic valve leaflets are somewhat thickened. Aortic valve appears tricuspid. Trivial aortic regurgitation is noted. Mitral annular calcification is present. Mild calcification of the anterior leaflet of the mitral valve.  Hyperlipidemia     Hypertension     WD-Dog bite of toe left 2nd toe 9/17/2020    WD-Open wound of left great toe due to dog bite 9/17/2020       FAMILY HISTORY  Family History   Problem Relation Age of Onset    Heart Disease Father        SOCIAL HISTORY  Social History     Socioeconomic History    Marital status:      Spouse name: None    Number of children: None    Years of education: None    Highest education level: None   Occupational History    None   Tobacco Use    Smoking status: Never Smoker    Smokeless tobacco: Never Used   Vaping Use    Vaping Use: Never used   Substance and Sexual Activity    Alcohol use: Yes     Comment: caffeine 2 cups of coffee a day    Drug use: No    Sexual activity: None   Other Topics Concern    None   Social History Narrative    None     Social Determinants of Health     Financial Resource Strain:     Difficulty of Paying Living Expenses:    Food Insecurity:     Worried About Running Out of Food in the Last Year:     Ran Out of Food in the Last Year:    Transportation Needs:     Lack of Transportation (Medical):      Lack of Transportation (Non-Medical):    Physical Activity:     Days of Exercise per Week:     Minutes of Exercise per Session:    Stress:     Feeling of Stress :    Social Connections:     Frequency of Communication with Friends and Family:     Frequency of Social Gatherings with Friends and Family:     Attends Confucianism Services:     Active Member of Clubs or Organizations:     Attends Club or Organization Meetings:     Marital Status:    Intimate Partner Violence:     Fear of Current or Ex-Partner:     Emotionally Abused:     Physically Abused:     Sexually Abused:         SURGICAL HISTORY  Past Surgical History:   Procedure Laterality Date    OTHER SURGICAL HISTORY  08/22/2018    Atrial Fib cardiac Ablation       CURRENT MEDICATIONS  Current Outpatient Medications   Medication Sig Dispense Refill    gabapentin (NEURONTIN) 300 MG capsule Take 1 capsule by mouth 3 times daily for 30 days. 90 capsule 2    PARoxetine (PAXIL) 30 MG tablet TAKE ONE TABLET BY MOUTH EVERY MORNING 90 tablet 1    rivaroxaban (XARELTO) 20 MG TABS tablet Take 1 tablet by mouth daily (with breakfast) 21 tablet 0    dilTIAZem (CARDIZEM CD) 180 MG extended release capsule Take 1 capsule by mouth daily 180 capsule 1    tamsulosin (FLOMAX) 0.4 MG capsule       atorvastatin (LIPITOR) 40 MG tablet TAKE ONE TABLET BY MOUTH DAILY 90 tablet 1    celecoxib (CELEBREX) 200 MG capsule Take 1 capsule by mouth daily 90 capsule 1    glimepiride (AMARYL) 4 MG tablet TAKE ONE TABLET BY MOUTH TWICE A DAY WITH MEALS 180 tablet 1    hydroCHLOROthiazide (HYDRODIURIL) 25 MG tablet TAKE ONE TABLET BY MOUTH EVERY EVENING 90 tablet 1    insulin detemir (LEVEMIR FLEXTOUCH) 100 UNIT/ML injection pen Inject 10 Units into the skin nightly 9 mL 1    levothyroxine (SYNTHROID) 100 MCG tablet TAKE ONE TABLET BY MOUTH EVERY EVENING 90 tablet 1    lisinopril (PRINIVIL;ZESTRIL) 40 MG tablet TAKE ONE TABLET BY MOUTH DAILY 90 tablet 1    metFORMIN (GLUCOPHAGE-XR) 500 MG extended release tablet TAKE FOUR TABLETS BY MOUTH DAILY WITH BREAKFAST 360 tablet 1    aspirin 81 MG tablet Take 81 mg by mouth daily      Blood Glucose Monitoring Suppl (ONETOUCH VERIO IQ SYSTEM) W/DEVICE KIT       ONETOUCH VERIO strip       BD PEN NEEDLE RADU U/F 32G X 4 MM MISC       ONETOUCH DELICA LANCETS FINE MISC        No current facility-administered medications for this visit.        ALLERGIES  Allergies   Allergen Reactions    Morphine Rash       PHYSICAL EXAM    /70   Pulse 80   Ht 5' 11\" (1.803 m) Wt 246 lb (111.6 kg)   SpO2 98%   BMI 34.31 kg/m²     Physical Exam  Vitals and nursing note reviewed. Constitutional:       Appearance: Normal appearance. Cardiovascular:      Rate and Rhythm: Normal rate. Pulmonary:      Effort: Pulmonary effort is normal.     In office A1c 6.8 prior was 6.2    ASSESSMENT and PLAN    Parmjit Valentin was seen today for 3 month follow-up, weight loss, leg pain and shortness of breath. Diagnoses and all orders for this visit:    Type 2 diabetes mellitus without complication, without long-term current use of insulin ([facility])  -     POCT glycosylated hemoglobin (Hb A1C)  -     Comprehensive Metabolic Panel; Future  -     Hemoglobin A1C; Future  -     Lipid Panel; Future  -     Microalbumin / Creatinine Urine Ratio; Future    Hip pain  -     [facility] Coleman MD, Orthopedic Surgery, [place]    SOB (shortness of breath)    Other orders  -     gabapentin (NEURONTIN) 300 MG capsule; Take 1 capsule by mouth 3 times daily for 30 days.  -     PARoxetine (PAXIL) 30 MG tablet; TAKE ONE TABLET BY MOUTH EVERY MORNING    Same treatment for the diabetes  Refer to orthopedics for the hip  Follow-up in 6 months and get labs prior to that office visit including A1c lipids microalbumin etc.    Return in about 6 months (around 12/14/2021). Electronically signed by Cierra Puga MD on 6/14/2021    Please note that this chart was generated using dragon dictation software. Although every effort was made to ensure the accuracy of this automated transcription, some errors in transcription may have occurred.

## 2021-07-02 RX ORDER — DILTIAZEM HYDROCHLORIDE 180 MG/1
CAPSULE, COATED, EXTENDED RELEASE ORAL
Qty: 180 CAPSULE | Refills: 0 | OUTPATIENT
Start: 2021-07-02

## 2021-07-02 NOTE — TELEPHONE ENCOUNTER
Called and spoke with the pt. Verified he has a refill remaining for this medication. No further action needed.

## 2021-07-07 ENCOUNTER — OFFICE VISIT (OUTPATIENT)
Dept: FAMILY MEDICINE CLINIC | Age: 66
End: 2021-07-07
Payer: MEDICARE

## 2021-07-07 ENCOUNTER — HOSPITAL ENCOUNTER (OUTPATIENT)
Age: 66
Setting detail: SPECIMEN
Discharge: HOME | End: 2021-07-07
Payer: MEDICARE

## 2021-07-07 VITALS — TEMPERATURE: 97.2 F | OXYGEN SATURATION: 99 % | HEART RATE: 88 BPM

## 2021-07-07 DIAGNOSIS — R43.2 LOSS OF TASTE: ICD-10-CM

## 2021-07-07 DIAGNOSIS — J01.90 ACUTE BACTERIAL SINUSITIS: Primary | ICD-10-CM

## 2021-07-07 DIAGNOSIS — R09.81 NASAL CONGESTION: ICD-10-CM

## 2021-07-07 DIAGNOSIS — R43.0 LOSS OF SMELL: ICD-10-CM

## 2021-07-07 DIAGNOSIS — B96.89 ACUTE BACTERIAL SINUSITIS: Primary | ICD-10-CM

## 2021-07-07 DIAGNOSIS — R05.9 COUGH: ICD-10-CM

## 2021-07-07 DIAGNOSIS — R51.9 GENERALIZED HEADACHE: ICD-10-CM

## 2021-07-07 DIAGNOSIS — J34.89 SINUS PAIN: ICD-10-CM

## 2021-07-07 DIAGNOSIS — R53.83 FATIGUE, UNSPECIFIED TYPE: ICD-10-CM

## 2021-07-07 DIAGNOSIS — J34.89 SINUS PRESSURE: ICD-10-CM

## 2021-07-07 DIAGNOSIS — M79.10 MUSCLE ACHE: ICD-10-CM

## 2021-07-07 PROCEDURE — 99213 OFFICE O/P EST LOW 20 MIN: CPT | Performed by: NURSE PRACTITIONER

## 2021-07-07 PROCEDURE — U0005 INFEC AGEN DETEC AMPLI PROBE: HCPCS

## 2021-07-07 PROCEDURE — 3017F COLORECTAL CA SCREEN DOC REV: CPT | Performed by: NURSE PRACTITIONER

## 2021-07-07 PROCEDURE — U0003 INFECTIOUS AGENT DETECTION BY NUCLEIC ACID (DNA OR RNA); SEVERE ACUTE RESPIRATORY SYNDROME CORONAVIRUS 2 (SARS-COV-2) (CORONAVIRUS DISEASE [COVID-19]), AMPLIFIED PROBE TECHNIQUE, MAKING USE OF HIGH THROUGHPUT TECHNOLOGIES AS DESCRIBED BY CMS-2020-01-R: HCPCS

## 2021-07-07 PROCEDURE — 4040F PNEUMOC VAC/ADMIN/RCVD: CPT | Performed by: NURSE PRACTITIONER

## 2021-07-07 PROCEDURE — 1036F TOBACCO NON-USER: CPT | Performed by: NURSE PRACTITIONER

## 2021-07-07 PROCEDURE — 1123F ACP DISCUSS/DSCN MKR DOCD: CPT | Performed by: NURSE PRACTITIONER

## 2021-07-07 PROCEDURE — G8427 DOCREV CUR MEDS BY ELIG CLIN: HCPCS | Performed by: NURSE PRACTITIONER

## 2021-07-07 PROCEDURE — G8417 CALC BMI ABV UP PARAM F/U: HCPCS | Performed by: NURSE PRACTITIONER

## 2021-07-07 RX ORDER — AMOXICILLIN AND CLAVULANATE POTASSIUM 875; 125 MG/1; MG/1
1 TABLET, FILM COATED ORAL 2 TIMES DAILY
Qty: 20 TABLET | Refills: 0 | Status: SHIPPED | OUTPATIENT
Start: 2021-07-07 | End: 2021-07-17

## 2021-07-07 RX ORDER — FLUTICASONE PROPIONATE 50 MCG
1 SPRAY, SUSPENSION (ML) NASAL DAILY
Qty: 1 BOTTLE | Refills: 0 | Status: SHIPPED | OUTPATIENT
Start: 2021-07-07

## 2021-07-07 NOTE — PROGRESS NOTES
7/7/2021    HPI:  Chief complaint and history of present illness as per medical assistant/nurse documented today in the Flu/COVID-19 clinic. Patient is here with complaints of cough, nasal congestion, SOB at times, headache, muscle aches, loss of smell/taste, and sinus pain/pressure x 1 month. Patient states he has had his covid vaccine. MEDICATIONS:  Prior to Visit Medications    Medication Sig Taking? Authorizing Provider   amoxicillin-clavulanate (AUGMENTIN) 875-125 MG per tablet Take 1 tablet by mouth 2 times daily for 10 days Yes ASH Amaya CNP   fluticasone (FLONASE) 50 MCG/ACT nasal spray 1 spray by Nasal route daily Yes ASH Amaya CNP   gabapentin (NEURONTIN) 300 MG capsule Take 1 capsule by mouth 3 times daily for 30 days.   Ashlee Darden MD   PARoxetine (PAXIL) 30 MG tablet TAKE ONE TABLET BY MOUTH EVERY MORNING  Ashlee Darden MD   rivaroxaban (XARELTO) 20 MG TABS tablet Take 1 tablet by mouth daily (with breakfast)  Saroj Hernandes MD   dilTIAZem (CARDIZEM CD) 180 MG extended release capsule Take 1 capsule by mouth daily  Ashlee Darden MD   tamsulosin (FLOMAX) 0.4 MG capsule   Historical Provider, MD   atorvastatin (LIPITOR) 40 MG tablet TAKE ONE TABLET BY MOUTH DAILY  Ashlee Darden MD   celecoxib (CELEBREX) 200 MG capsule Take 1 capsule by mouth daily  Ashlee Darden MD   glimepiride (AMARYL) 4 MG tablet TAKE ONE TABLET BY MOUTH TWICE A DAY WITH MEALS  Ashlee Darden MD   hydroCHLOROthiazide (HYDRODIURIL) 25 MG tablet TAKE ONE TABLET BY MOUTH EVERY EVENING  Ashlee Darden MD   insulin detemir (LEVEMIR FLEXTOUCH) 100 UNIT/ML injection pen Inject 10 Units into the skin nightly  Ashlee Darden MD   levothyroxine (SYNTHROID) 100 MCG tablet TAKE ONE TABLET BY MOUTH EVERY EVENING  Ashlee Darden MD   lisinopril (PRINIVIL;ZESTRIL) 40 MG tablet TAKE ONE TABLET BY MOUTH DAILY  Ashlee Darden MD   metFORMIN (GLUCOPHAGE-XR) 500 MG extended release Problem Relation Age of Onset    Heart Disease Father    ,   Immunization History   Administered Date(s) Administered    COVID-19, Moderna, PF, 100mcg/0.5mL 02/08/2021, 03/09/2021    DTaP, 5 Pertussis Antigens (Daptacel) 08/23/2012    Influenza A ([id number]) Vaccine PF IM 11/03/2009    Influenza Vaccine, unspecified formulation 11/08/2016    Influenza, High Dose (Fluzone 65 yrs and older) 09/27/2018, 11/16/2019    Influenza, Quadv, adjuvanted, 65 yrs +, IM, PF (Fluad) 11/04/2020    Pneumococcal Conjugate 13-valent ([id number]) 10/21/2017    Pneumococcal Polysaccharide ([id number]) 09/10/2016    Tdap (Boostrix, Adacel) 09/10/2020    Zoster Live (Zostavax) 09/10/2016   ,   Health Maintenance   Topic Date Due    Hepatitis C screen  Never done    Diabetic retinal exam  Never done    Diabetic microalbuminuria test  Never done    Shingles Vaccine (2 of 3) 11/05/2016    Diabetic foot exam  09/27/2019    Lipid screen  08/13/2020    Potassium monitoring  08/13/2020    Creatinine monitoring  08/13/2020    Annual Wellness Visit (AWV)  08/12/2021    Flu vaccine (1) 09/01/2021    Pneumococcal 65+ years Vaccine (2 of 2 - PPSV23) 09/10/2021    A1C test (Diabetic or Prediabetic)  06/14/2022    Colon cancer screen fecal DNA test (Cologuard)  09/25/2023    DTaP/Tdap/Td vaccine (3 - Td or Tdap) 09/10/2030    COVID-19 Vaccine  Completed    Hepatitis A vaccine  Aged Out    Hib vaccine  Aged Out    Meningococcal (ACWY) vaccine  Aged Out       PHYSICAL EXAM:  Physical Exam  Constitutional:       Appearance: Normal appearance. HENT:      Head: Normocephalic. Right Ear: Tympanic membrane, ear canal and external ear normal.      Left Ear: Tympanic membrane, ear canal and external ear normal.      Nose: Congestion present. Right Sinus: No maxillary sinus tenderness or frontal sinus tenderness. Left Sinus: Maxillary sinus tenderness and frontal sinus tenderness present.       Mouth/Throat: Lips: Pink. Mouth: Mucous membranes are moist.      Pharynx: Oropharynx is clear. Cardiovascular:      Rate and Rhythm: Normal rate and regular rhythm. Heart sounds: Normal heart sounds. Pulmonary:      Effort: Pulmonary effort is normal.      Breath sounds: Normal breath sounds. Musculoskeletal:      Cervical back: Neck supple. Skin:     General: Skin is warm and dry. Neurological:      Mental Status: He is alert and oriented to person, place, and time. Psychiatric:         Mood and Affect: Mood normal.         Behavior: Behavior normal.         ASSESSMENT/PLAN:  1. Acute bacterial sinusitis  Increase fluids. - amoxicillin-clavulanate (AUGMENTIN) 875-125 MG per tablet; Take 1 tablet by mouth 2 times daily for 10 days  Dispense: 20 tablet; Refill: 0  - fluticasone (FLONASE) 50 MCG/ACT nasal spray; 1 spray by Nasal route daily  Dispense: 1 Bottle; Refill: 0    2. Cough  Your COVID 19 test can take 3-5 days for the results to come back. We ask that you make a Mychart page and view your test results this way. You will need to Self quarantine until you know your results. Increase fluids and rest  Warm salt gargles as needed for throat discomfort  Monitor temperature twice a day  Tylenol as needed for fevers and/or discomfort. Big deep breaths periodically throughout the day  Regular Mucinex over the counter as needed for chest congestion  If symptoms worsen -Go to the ER. Follow up with your primary care provider  - COVID-19 Ambulatory    3. Nasal congestion  - COVID-19 Ambulatory    4. Generalized headache  - COVID-19 Ambulatory    5. Muscle ache  - COVID-19 Ambulatory    6. Loss of smell  - COVID-19 Ambulatory    7. Loss of taste  - COVID-19 Ambulatory    8. Fatigue, unspecified type  - COVID-19 Ambulatory    9. Sinus pain  - COVID-19 Ambulatory    10. Sinus pressure  - COVID-19 Ambulatory          FOLLOW-UP:  Return if symptoms worsen or fail to improve.     In addition to other information, the printed after visit summary provided to the patient includes:  [x] COVID-19 Self care instructions  [x] COVID-19 General patient information

## 2021-07-07 NOTE — PATIENT INSTRUCTIONS
Your COVID 19 test can take 3-5 days for the results to come back. We ask that you make a Mychart page and view your test results this way. You will need to Self quarantine until you know your results. Increase fluids and rest  Saline nasal spray as needed for nasal congestion  Warm salt gargles as needed for throat discomfort  Monitor temperature twice a day  Tylenol as needed for fevers and/or discomfort. Big deep breaths periodically throughout the day  Regular Mucinex over the counter as needed for chest congestion  If symptoms worsen -Go to the ER. Follow up with your primary care provider      To Whom it May Concern:    Arlette Hyatt was tested for COVID-19 7/7/2021. He/she must stay home until test results are back. If test is positive, he/she must quarantine for a total of 10 days starting from day one of symptom onset. He/she must also be fever-free for 24 hours at that time, and also have improvement in symptoms. We do not recommend retesting as patients may continue to test positive for months even though no longer contagious. It is suggested you call [address] or  [place] with any questions regarding quarantine timeframe/return to work/school details.

## 2021-07-07 NOTE — PROGRESS NOTES
7/7/21  Obie Fothergill Swords  1955    FLU/COVID-19 CLINIC EVALUATION    HPI SYMPTOMS:    Employer: Retired    [] Fevers  [] Chills  [x] Cough  [] Coughing up blood  [x] Chest Congestion  [x] Nasal Congestion  [x] Feeling short of breath  [] Sometimes  [x] Frequently  [] All the time  [] Chest pain  [x] Headaches  []Tolerable  [x] Severe  [] Sore throat  [x] Muscle aches  [] Nausea  [] Vomiting  []Unable to keep fluids down  [] Diarrhea  []Severe    [x] OTHER SYMPTOMS: loss of smell and taste, fatigue    Symptom Duration:   [] 1  [] 2   [] 3   [] 4    [] 5   [] 6   [] 7   [] 8   [] 9   [] 10   [] 11   [] 12   [] 13   [] 14   [x] Longer than 14 days    Symptom course:   [] Worsening     [x] Stable     [] Improving    RISK FACTORS:    [] Pregnant or possibly pregnant  [x] Age over 61  [x] Diabetes  [x] Heart disease  [] Asthma  [] COPD/Other chronic lung diseases  [] Active Cancer  [] On Chemotherapy  [] Taking oral steroids  [] History Lymphoma/Leukemia  [] Close contact with a lab confirmed COVID-19 patient within 14 days of symptom onset  [] History of travel from affected geographical areas within 14 days of symptom onset       VITALS:  There were no vitals filed for this visit. TESTS:    POCT FLU:  [] Positive     []Negative    ASSESSMENT:    [] Flu  [] Possible COVID-19  [] Strep    PLAN:    [] Discharge home with written instructions for:  [] Flu management  [] Possible COVID-19 infection with self-quarantine and management of symptoms  [] Follow-up with primary care physician or emergency department if worsens  [] Evaluation per physician/NP/PA in clinic  [] Sent to ER       An  electronic signature was used to authenticate this note.      --Floridalma Sheridan LPN on 8/1/1514 at 7:89 PM

## 2021-07-08 LAB
SARS-COV-2: NOT DETECTED
SOURCE: NORMAL

## 2021-07-15 ENCOUNTER — OFFICE VISIT (OUTPATIENT)
Dept: ORTHOPEDIC SURGERY | Age: 66
End: 2021-07-15
Payer: MEDICARE

## 2021-07-15 VITALS
WEIGHT: 240 LBS | HEIGHT: 71 IN | OXYGEN SATURATION: 98 % | HEART RATE: 97 BPM | BODY MASS INDEX: 33.6 KG/M2 | DIASTOLIC BLOOD PRESSURE: 65 MMHG | SYSTOLIC BLOOD PRESSURE: 124 MMHG | RESPIRATION RATE: 16 BRPM

## 2021-07-15 DIAGNOSIS — M54.16 LUMBAR RADICULOPATHY: Primary | ICD-10-CM

## 2021-07-15 PROCEDURE — 99202 OFFICE O/P NEW SF 15 MIN: CPT | Performed by: PHYSICIAN ASSISTANT

## 2021-07-15 PROCEDURE — G8427 DOCREV CUR MEDS BY ELIG CLIN: HCPCS | Performed by: PHYSICIAN ASSISTANT

## 2021-07-15 PROCEDURE — G8417 CALC BMI ABV UP PARAM F/U: HCPCS | Performed by: PHYSICIAN ASSISTANT

## 2021-07-15 ASSESSMENT — ENCOUNTER SYMPTOMS
BACK PAIN: 1
EYES NEGATIVE: 1
GASTROINTESTINAL NEGATIVE: 1
SHORTNESS OF BREATH: 1

## 2021-07-15 NOTE — PROGRESS NOTES
Review of Systems   Constitutional: Negative. HENT: Negative. Eyes: Negative. Respiratory: Positive for shortness of breath. Cardiovascular: Negative. Gastrointestinal: Negative. Genitourinary: Negative. Musculoskeletal: Positive for arthralgias, back pain and gait problem. Skin: Negative. Psychiatric/Behavioral: Negative. Reena Allen is a 74 y.o. male that presents the office today on consult from Dr. Elizabeth Mayberry for his right hip/right leg. Patient states that his pain is more posterior and also over the anterior thigh. He states that he has had multiple falls with the most recent one [place] on a floating dock. He states that he has had back issues in the past and did have a surgery couple years ago by Dr. Andry Howe. He states that he never did follow-up with the back doctor after surgery because he was doing well. He states that now he finds himself having to lean over as he walks after 10 or 15 feet and he also has pain down the front of his thigh. He is not having any pain within his groin. Patient was referred by  Carlin Tinsley MD  For right hip pain  Past Medical History:   Diagnosis Date    Arrhythmia     Atrial fibrillation ([facility] [address]     Carotid Doppler 03/21/2018    Mild (0-49%) disease of the Bilateral Internal carotid artery. No hemodynamically significant stenosis noted. Normal vertebral flow.  Diabetes mellitus ([address]     H/O echocardiogram 08/04/2020    EF 50%. Sclerotic aortic valve with mild stenosis. 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 No reversible myocardial ischemia seen. Uneventful pharmacological     History of echocardiogram 01/30/2017    Ejection fraction is visually estimated at 55%. mildly Increased LVOT velocity. Aortic valve leaflets are somewhat thickened. Aortic valve appears tricuspid. Trivial aortic regurgitation is noted. Mitral annular calcification is present. Mild calcification of the anterior leaflet of the mitral valve.  Hyperlipidemia     Hypertension     WD-Dog bite of toe left 2nd toe 9/17/2020    WD-Open wound of left great toe due to dog bite 9/17/2020       Past Surgical History:   Procedure Laterality Date    OTHER SURGICAL HISTORY  08/22/2018    Atrial Fib cardiac Ablation       Family History   Problem Relation Age of Onset    Heart Disease Father        Social History     Socioeconomic History    Marital status:      Spouse name: None    Number of children: None    Years of education: None    Highest education level: None   Occupational History    None   Tobacco Use    Smoking status: Never Smoker    Smokeless tobacco: Never Used   Vaping Use    Vaping Use: Never used   Substance and Sexual Activity    Alcohol use: Yes     Comment: caffeine 2 cups of coffee a day    Drug use: No    Sexual activity: None   Other Topics Concern    None   Social History Narrative    None     Social Determinants of Health     Financial Resource Strain:     Difficulty of Paying Living Expenses:    Food Insecurity:     Worried About Running Out of Food in the Last Year:     Ran Out of Food in the Last Year:    Transportation Needs:     Lack of Transportation (Medical):      Lack of Transportation (Non-Medical):    Physical Activity:     Days of Exercise per Week:     Minutes of Exercise per Session:    Stress:     Feeling of Stress :    Social Connections:     Frequency of Communication with Friends and Family:     Frequency of Social Gatherings with Friends and Family:     Attends Christianity Services:     Active Member of Clubs or Organizations:     Attends Club or Organization Meetings:     Marital Status:    Intimate Partner Violence:     Fear of Current or Ex-Partner:     Emotionally Abused:     Physically Abused:     Sexually Abused:        Current Outpatient Medications   Medication Sig Dispense Refill    amoxicillin-clavulanate (AUGMENTIN) 875-125 MG per tablet Take 1 tablet by mouth 2 times daily for 10 days 20 tablet 0    fluticasone (FLONASE) 50 MCG/ACT nasal spray 1 spray by Nasal route daily 1 Bottle 0    gabapentin (NEURONTIN) 300 MG capsule Take 1 capsule by mouth 3 times daily for 30 days. 90 capsule 2    PARoxetine (PAXIL) 30 MG tablet TAKE ONE TABLET BY MOUTH EVERY MORNING 90 tablet 1    rivaroxaban (XARELTO) 20 MG TABS tablet Take 1 tablet by mouth daily (with breakfast) 21 tablet 0    dilTIAZem (CARDIZEM CD) 180 MG extended release capsule Take 1 capsule by mouth daily 180 capsule 1    atorvastatin (LIPITOR) 40 MG tablet TAKE ONE TABLET BY MOUTH DAILY 90 tablet 1    celecoxib (CELEBREX) 200 MG capsule Take 1 capsule by mouth daily 90 capsule 1    glimepiride (AMARYL) 4 MG tablet TAKE ONE TABLET BY MOUTH TWICE A DAY WITH MEALS 180 tablet 1    hydroCHLOROthiazide (HYDRODIURIL) 25 MG tablet TAKE ONE TABLET BY MOUTH EVERY EVENING 90 tablet 1    insulin detemir (LEVEMIR FLEXTOUCH) 100 UNIT/ML injection pen Inject 10 Units into the skin nightly 9 mL 1    levothyroxine (SYNTHROID) 100 MCG tablet TAKE ONE TABLET BY MOUTH EVERY EVENING 90 tablet 1    lisinopril (PRINIVIL;ZESTRIL) 40 MG tablet TAKE ONE TABLET BY MOUTH DAILY 90 tablet 1    metFORMIN (GLUCOPHAGE-XR) 500 MG extended release tablet TAKE FOUR TABLETS BY MOUTH DAILY WITH BREAKFAST 360 tablet 1    aspirin 81 MG tablet Take 81 mg by mouth daily      Blood Glucose Monitoring Suppl (ONETOUCH VERIO IQ SYSTEM) W/DEVICE KIT       ONETOUCH VERIO strip       BD PEN NEEDLE RADU U/F 32G X 4 MM MISC       ONETOUCH DELICA LANCETS FINE MISC       tamsulosin (FLOMAX) 0.4 MG capsule  (Patient not taking: Reported on 7/15/2021)       No current facility-administered medications for this visit.        Allergies   Allergen Reactions    Morphine Rash       Review of Systems:  See above      Physical Exam:   /65   Pulse 97   Resp 16   Ht 5' 11\" (1.803 m)   Wt 240 lb (108.9 kg)   SpO2 98%   BMI 33.47 kg/m²        Gait is antalgic  . Gen/Psych:Examination reveals a pleasant individual in no acute distress. The patient is oriented to time, place and person. The patient's mood and affect are appropriate. Patient appears well nourished. Body habitus is overweight     Lymph:  no lymphedema in bilateral lower extremities     Skin intact in bilateral lower extremities with no ulcerations, lesions, rash, erythema. Vascular: There are mild varicosities in bilateral lower extremities, sensation intact to light touch over bilateral lower extremities. Right hip:  No tenderness to palpation of the greater trochanter, leg lengths are equal  Range of motion of the right hip passively shows 40 degrees external rotation, 30 degrees internal rotation hip flexion to 100 degrees. Patient has no pain with passive range of motion of the right hip. Patient does have pain over the anterior thigh with straight leg raise. Outsiderecord review: Review of prior MRI of lumbar spine and MRI report, review of x-rays of right hip    Imaging studies:  X-rays of the right hip show no acute fractures, no dislocations, mild degenerative change in the right hip joint.       MRI lumbar spine January 31, 2020  Narrative   EXAMINATION:   MRI OF THE LUMBAR SPINE WITHOUT CONTRAST, 1/31/2020 5:23 pm       TECHNIQUE:   Multiplanar multisequence MRI of the lumbar spine was performed without the   administration of intravenous contrast.       COMPARISON:   X-ray lumbar spine January 24, 2020       HISTORY:   ORDERING SYSTEM PROVIDED HISTORY: Lumbar radiculopathy   TECHNOLOGIST PROVIDED HISTORY:   Reason for exam:->lumbar pain, compression fracture per Xray   Reason for Exam: pt fell 3 wks ago hit knee back arm - severe back pain no   leg pain radiating -diff lying on table due to pain - cushion under back and   motion b/c pain       FINDINGS:   BONES/ALIGNMENT: There is abnormal bone marrow signal at the superior   endplate of L1 with up to 20% height loss, likely related to acute to   subacute compression fracture.  No retropulsion of posterior cortex.       There is abnormal bone marrow signal at the superior endplate of L2 with up   to 15% height loss, likely related to acute to subacute compression fracture.    No retropulsion of posterior cortex.       There is abnormal bone marrow signal in the L4 vertebral body with up to 60%   height loss at the superior endplate, likely related to acute to subacute   compression fracture.  There is 5 mm retropulsion of posterior cortex,   contributing to spinal canal stenosis.       There is normal alignment of the lumbar spine.  There is multilevel   degenerative disc disease with disc desiccation and endplate changes.  The   intervertebral disc heights are grossly maintained.  There is congenitally   narrow lumbar spinal canal.       SPINAL CORD: The conus terminates normally.       SOFT TISSUES: No paraspinal mass identified.       L1-L2: There is disc bulge with minimal spinal canal narrowing without   foraminal narrowing.       L2-L3: There is disc bulge, mild bilateral facet arthrosis, prominent   epidural fat, contributing to moderate spinal canal narrowing without   foraminal narrowing.       L3-L4: There is disc bulge, mild bilateral facet arthrosis, mild bilateral   ligamentum flavum hypertrophy, prominent epidural fat, contributing to severe   spinal canal narrowing and minimal left foraminal narrowing.       L4: Retropulsion of posterior cortex of L4, contributing to moderate to   severe spinal canal narrowing.       L4-L5: There is disc bulge, moderate bilateral facet arthrosis, mild   bilateral ligamentum flavum hypertrophy, prominent epidural fat, contributing   to moderate spinal canal narrowing and minimal bilateral foraminal narrowing.       L5-S1: There is disc bulge, left paracentral disc protrusion, with mild left   foraminal narrowing.  No spinal canal narrowing.           Impression   Acute to subacute compression fracture at the superior endplate of L4 with   87% height loss, and 5 mm retropulsion of posterior cortex, contributing to   moderate to severe spinal canal stenosis at L4 level.       Acute to subacute compression fracture at the superior endplate of L1 with   07% height loss.       Acute to subacute compression fracture at the superior endplate of L2 with   49% height loss       Multilevel degenerative disc disease as described above, together with   epidural lipomatosis, exacerbating congenitally narrow lumbar spinal canal.       Spinal canal narrowing, severe at L3-4, moderate at L2-3 and L4-5, minimal at   L1-2.       Foraminal narrowing, mild at left L5-S1, minimal at left L3-4 and bilateral   L4-5.       The results were sent to radiology results communication. Impression:     Diagnosis Orders   1. Lumbar radiculopathy  External Referral - Caprice Barthel, MD, Neurosurgery, [place]           Plan:    I explained to him that I believe his symptoms are actually coming from his lumbar spine and he may need another MRI as this point is a year and a half old and he states he has had surgery on the back since then. He would like a referral to another neurosurgeon.   Patient Instructions   Referral sent to Dr. Barron Loose to weight bear as tolerated  Continue range of motion  Ice and elevate as needed  Tylenol or Motrin for pain  Follow up with Orthopedic

## 2021-07-15 NOTE — PATIENT INSTRUCTIONS
Referral sent to Dr. Chloe Syed to weight bear as tolerated  Continue range of motion  Ice and elevate as needed  Tylenol or Motrin for pain  Follow up with Orthopedic

## 2021-07-18 ENCOUNTER — TELEPHONE (OUTPATIENT)
Dept: FAMILY MEDICINE CLINIC | Age: 66
End: 2021-07-18

## 2021-07-18 NOTE — PROGRESS NOTES
Patient found dead this morning at 8:53 AM.  Received call from &#39;s office requesting Dr. Nicolás Ruiz to sign death certificate.   Please advise

## 2021-10-20 NOTE — PROGRESS NOTES
Addended by: NIKHIL CAST on: 10/20/2021 06:01 PM     Modules accepted: Orders     Wound Care Center Progress Note With Procedure    Quinn Norton  AGE: 77 y.o. GENDER: male  : 1955  EPISODE DATE:  2020     Subjective:     Chief Complaint   Patient presents with    Wound Check     left foot          HISTORY of PRESENT ILLNESS      Madhuri Robbins is a 77 y.o. male who presents today for wound evaluation of Chronic traumatic ulcer(s) of the left 2nd toe. The ulcer is of mild severity. The underlying cause of the wound is a dogbite. Wound Pain Timing/Severity: none  Quality of pain: N/A  Severity of pain:  0 / 10   Modifying Factors: diabetes, obesity and anticoagulation therapy  Associated Signs/Symptoms: none        PAST MEDICAL HISTORY        Diagnosis Date    Arrhythmia     Atrial fibrillation ([address]     Carotid Doppler 2018    Mild (0-49%) disease of the Bilateral Internal carotid artery. No hemodynamically significant stenosis noted. Normal vertebral flow.  Diabetes mellitus ([address]     H/O echocardiogram 2020    EF 50%. Sclerotic aortic valve with mild stenosis. 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 No reversible myocardial ischemia seen. Uneventful pharmacological     History of echocardiogram 2017    Ejection fraction is visually estimated at 55%. mildly Increased LVOT velocity. Aortic valve leaflets are somewhat thickened. Aortic valve appears tricuspid. Trivial aortic regurgitation is noted. Mitral annular calcification is present. Mild calcification of the anterior leaflet of the mitral valve.     Hyperlipidemia     Hypertension     WD-Dog bite of toe left 2nd toe 2020    WD-Open wound of left great toe due to dog bite 2020       PAST SURGICAL HISTORY    Past Surgical History:   Procedure Laterality Date    OTHER SURGICAL HISTORY  2018    Atrial Fib cardiac Ablation       FAMILY HISTORY    Family History Problem Relation Age of Onset    Heart Disease Father        SOCIAL HISTORY    Social History     Tobacco Use    Smoking status: Never Smoker    Smokeless tobacco: Never Used   Substance Use Topics    Alcohol use: Yes     Comment: caffeine 2 cups of coffee a day    Drug use: No       ALLERGIES    Allergies   Allergen Reactions    Morphine Rash       MEDICATIONS    Current Outpatient Medications on File Prior to Encounter   Medication Sig Dispense Refill    celecoxib (CELEBREX) 200 MG capsule Take 1 capsule by mouth daily 90 capsule 1    atorvastatin (LIPITOR) 40 MG tablet TAKE ONE TABLET BY MOUTH DAILY 90 tablet 1    dilTIAZem (CARDIZEM CD) 180 MG extended release capsule Take 1 capsule by mouth daily 180 capsule 1    lisinopril (PRINIVIL;ZESTRIL) 40 MG tablet TAKE ONE TABLET BY MOUTH DAILY 90 tablet 0    metFORMIN (GLUCOPHAGE-XR) 500 MG extended release tablet TAKE FOUR TABLETS BY MOUTH DAILY WITH BREAKFAST 360 tablet 0    levothyroxine (SYNTHROID) 100 MCG tablet TAKE ONE TABLET BY MOUTH EVERY EVENING 90 tablet 0    rivaroxaban (XARELTO) 20 MG TABS tablet Take 1 tablet by mouth daily (with breakfast) 30 tablet 5    hydroCHLOROthiazide (HYDRODIURIL) 25 MG tablet TAKE ONE TABLET BY MOUTH EVERY EVENING 90 tablet 1    glimepiride (AMARYL) 4 MG tablet TAKE ONE TABLET BY MOUTH TWICE A DAY WITH MEALS 180 tablet 1    PARoxetine (PAXIL) 30 MG tablet TAKE ONE TABLET BY MOUTH EVERY MORNING 30 tablet 5    insulin detemir (LEVEMIR FLEXTOUCH) 100 UNIT/ML injection pen Inject 10 Units into the skin nightly 5 pen 3    insulin detemir (LEVEMIR) 100 UNIT/ML injection vial Inject 10 Units into the skin nightly 3 vial 1    tiZANidine (ZANAFLEX) 2 MG tablet Take 1 tablet by mouth 3 times daily as needed (muscle spasm) 30 tablet 0    aspirin 81 MG tablet Take 81 mg by mouth daily      terbinafine (LAMISIL) 1 % cream Apply topically 2 times daily Apply topically 2 times daily.  Blood Glucose Monitoring Suppl (ONETOUCH VERIO IQ SYSTEM) W/DEVICE KIT       ONETOUCH VERIO strip       BD PEN NEEDLE RADU U/F 32G X 4 MM MISC       ONETOUCH DELICA LANCETS FINE MISC       gabapentin (NEURONTIN) 300 MG capsule Take 1 capsule by mouth 3 times daily for 30 days. 90 capsule 2    pioglitazone (ACTOS) 15 MG tablet TAKE ONE TABLET BY MOUTH EVERY EVENING 16 tablet 1     No current facility-administered medications on file prior to encounter. REVIEW OF SYSTEMS    Pertinent items are noted in HPI. Constitutional: Negative for systemic symptoms including fever, chills and malaise. Objective:      BP (!) 166/70   Pulse 98   Temp 96.7 °F (35.9 °C) (Temporal)   Resp 18     PHYSICAL EXAM    General: The patient is in no acute distress. Mental status:  Patient is appropriate, is  oriented to place and plan of care. Dermatologic exam: Visual inspection of the periwound reveals the skin to be normal in turgor and texture  Wound exam: see wound description below in procedure note      Assessment:     Problem List Items Addressed This Visit     WD-Dog bite of toe left 2nd toe - Primary    Relevant Orders    Supply: Wound Cleanser    Supply: Wound Dressings    Supply: Cover and Secure    WD-Open wound of left great toe due to dog bite    Relevant Orders    Supply: Wound Cleanser    Supply: Wound Dressings    Supply: Cover and Secure        Procedure Note    Indications:  Based on my examination of this patient's wound(s) today, sharp excision into necrotic subcutaneous tissue is required to promote healing and evaluate the extent of previous healing. Performed by: ASH Mcdonough CNP    Consent obtained: Yes    Time out taken:  Yes    Pain Control: none needed       Debridement:Excisional Debridement    Using curette the wound(s) was/were sharply debrided down through and including the removal of subcutaneous tissue.         Devitalized Tissue Debrided:  slough and exudate Pre Debridement Measurements:  Are located in the Wound Documentation Flow Sheet    All active wounds listed below with today's date are evaluated  Wound(s)    debrided this date include # : 2     Post  Debridement Measurements:  Wound 09/17/20 Toe (Comment  which one) Left #2 Left Second Toe (Active)   Wound Image   12/31/20 0813   Dressing Status New dressing applied 12/31/20 0829   Wound Cleansed Soap and water 12/31/20 0813   Wound Length (cm) 0.3 cm 12/31/20 0813   Wound Width (cm) 0.3 cm 12/31/20 0813   Wound Depth (cm) 0.1 cm 12/31/20 0813   Wound Surface Area (cm^2) 0.09 cm^2 12/31/20 0813   Change in Wound Size % (l*w) 97 12/31/20 0813   Wound Volume (cm^3) 0.01 cm^3 12/31/20 0813   Wound Healing % 97 12/31/20 0813   Post-Procedure Length (cm) 0.3 cm 12/31/20 0819   Post-Procedure Width (cm) 0.3 cm 12/31/20 0819   Post-Procedure Depth (cm) 0.1 cm 12/31/20 0819   Post-Procedure Surface Area (cm^2) 0.09 cm^2 12/31/20 0819   Post-Procedure Volume (cm^3) 0.01 cm^3 12/31/20 0819   Distance Tunneling (cm) 0 cm 12/31/20 0813   Tunneling Position ___ O'Clock 0 12/31/20 0813   Undermining Starts ___ O'Clock 0 12/31/20 0813   Undermining Ends___ O'Clock 0 12/31/20 0813   Undermining Maxium Distance (cm) 0 12/31/20 0813   Wound Assessment Granulation tissue 12/31/20 0813   Drainage Amount Moderate 12/31/20 0813   Drainage Description Clear 12/31/20 0813   Odor None 12/31/20 0813   Magdalena-wound Assessment Intact 12/31/20 0813   Margins Defined edges 12/31/20 0813   Wound Thickness Description not for Pressure Injury Full thickness 12/31/20 0813   Number of days: 105       Percent of Wound(s) Debrided: approximately 100%    Total  Area  Debrided:  0.09 sq cm     Bleeding:  Minimal    Hemostasis Achieved:  by pressure    Procedural Pain:  0  / 10     Post Procedural Pain:  0 / 10     Response to treatment:  Well tolerated by patient. Status of wound progress and description from last visit: Continues to improve. The patient's toe nails were trimmed today, all 10 nails were trimmed using rongeurs. Medical necessity includes advanced atrophic changes to include decrease hair growth lower legs, thickening, discolored toenails and rubor L60.2. Dr. Martin Granda is PCP last seen 2020. Plan:       Discharge Instructions         PHYSICIAN ORDERS AND DISCHARGE INSTRUCTIONS     NOTE: Upon discharge from the [address], you will receive a patient experience survey. We would be grateful if you would take the time to fill this survey out.     Wound care order history:                               Initial Visit: 20  ELOINA's   Right       Left               Date:              Imagin/10/20  Amputation of the 2nd digit at the level of the middle phalanx with associated soft tissue swelling      Cultures:  SENT ON 20  MRSA +              Antibiotics:                       Grafts:      Continuing wound care orders and information:                 Residence:                Continue home health care with:               Your wound-care supplies will be provided by:      Wound cleansing:               LV not scrub or use excessive force.              Wash hands with soap and water before and after dressing changes.               QXDST to applying a clean dressing, cleanse wound with normal saline, wound cleanser, or mild soap and water.               Ask the physician or nurse before getting the wound(s) wet in a shower     Daily Wound management:              Keep weight off wounds and reposition every 2 hours.              Avoid standing for long periods of time.              Apply wraps/stockings in AM and remove at bedtime.              If swelling is present, elevate legs to the level of the heart or above for 30 minutes 4-5 times a day and/or when sitting.                                     UVFE taking antibiotics take entire prescription as ordered by physician do not stop taking until medicine is all gone.                                   Orders for this week:     12/31/20    Paint toes with Betadine  Big Toe and Second Toe of Left Foot :Wash with soap and water. Pat dry. Paint with betadine and apply buster  Wrap with conform  Secure With tape      Change Daily     Follow up with Dr Connie Brownlee in the wound care center  Call  for any questions or concerns.   Date__________   Time__________          Treatment Note Wound 09/17/20 Toe (Comment  which one) Left #2 Left Second Toe-Dressing/Treatment: (Betadine Buster conform tape)    Written Patient Dismissal Instructions Given            Electronically signed by ASH Valle CNP on 12/31/2020 at 8:33 AM